# Patient Record
Sex: MALE | Race: WHITE | NOT HISPANIC OR LATINO | URBAN - METROPOLITAN AREA
[De-identification: names, ages, dates, MRNs, and addresses within clinical notes are randomized per-mention and may not be internally consistent; named-entity substitution may affect disease eponyms.]

---

## 2022-11-03 ENCOUNTER — INPATIENT (INPATIENT)
Facility: HOSPITAL | Age: 87
LOS: 11 days | Discharge: NURSING, REHAB OR LONG-TERM CARE | DRG: 696 | End: 2022-11-15
Attending: INTERNAL MEDICINE | Admitting: INTERNAL MEDICINE
Payer: MEDICARE

## 2022-11-03 VITALS
RESPIRATION RATE: 16 BRPM | WEIGHT: 195.11 LBS | HEIGHT: 75 IN | OXYGEN SATURATION: 97 % | SYSTOLIC BLOOD PRESSURE: 131 MMHG | HEART RATE: 92 BPM | TEMPERATURE: 98 F | DIASTOLIC BLOOD PRESSURE: 76 MMHG

## 2022-11-03 DIAGNOSIS — Z29.9 ENCOUNTER FOR PROPHYLACTIC MEASURES, UNSPECIFIED: ICD-10-CM

## 2022-11-03 DIAGNOSIS — R31.9 HEMATURIA, UNSPECIFIED: ICD-10-CM

## 2022-11-03 DIAGNOSIS — N17.9 ACUTE KIDNEY FAILURE, UNSPECIFIED: Chronic | ICD-10-CM

## 2022-11-03 DIAGNOSIS — I10 ESSENTIAL (PRIMARY) HYPERTENSION: ICD-10-CM

## 2022-11-03 DIAGNOSIS — F03.90 UNSPECIFIED DEMENTIA, UNSPECIFIED SEVERITY, WITHOUT BEHAVIORAL DISTURBANCE, PSYCHOTIC DISTURBANCE, MOOD DISTURBANCE, AND ANXIETY: ICD-10-CM

## 2022-11-03 DIAGNOSIS — I49.9 CARDIAC ARRHYTHMIA, UNSPECIFIED: ICD-10-CM

## 2022-11-03 DIAGNOSIS — N17.9 ACUTE KIDNEY FAILURE, UNSPECIFIED: ICD-10-CM

## 2022-11-03 LAB
ALBUMIN SERPL ELPH-MCNC: 3.4 G/DL — LOW (ref 3.5–5)
ALP SERPL-CCNC: 124 U/L — HIGH (ref 40–120)
ALT FLD-CCNC: 19 U/L DA — SIGNIFICANT CHANGE UP (ref 10–60)
ANION GAP SERPL CALC-SCNC: 5 MMOL/L — SIGNIFICANT CHANGE UP (ref 5–17)
ANION GAP SERPL CALC-SCNC: 9 MMOL/L — SIGNIFICANT CHANGE UP (ref 5–17)
APPEARANCE UR: ABNORMAL
APTT BLD: 39.9 SEC — HIGH (ref 27.5–35.5)
AST SERPL-CCNC: 22 U/L — SIGNIFICANT CHANGE UP (ref 10–40)
BASOPHILS # BLD AUTO: 0.06 K/UL — SIGNIFICANT CHANGE UP (ref 0–0.2)
BASOPHILS NFR BLD AUTO: 0.5 % — SIGNIFICANT CHANGE UP (ref 0–2)
BILIRUB SERPL-MCNC: 0.5 MG/DL — SIGNIFICANT CHANGE UP (ref 0.2–1.2)
BILIRUB UR-MCNC: NEGATIVE — SIGNIFICANT CHANGE UP
BUN SERPL-MCNC: 48 MG/DL — HIGH (ref 7–18)
BUN SERPL-MCNC: 60 MG/DL — HIGH (ref 7–18)
CALCIUM SERPL-MCNC: 9.2 MG/DL — SIGNIFICANT CHANGE UP (ref 8.4–10.5)
CALCIUM SERPL-MCNC: 9.4 MG/DL — SIGNIFICANT CHANGE UP (ref 8.4–10.5)
CHLORIDE SERPL-SCNC: 104 MMOL/L — SIGNIFICANT CHANGE UP (ref 96–108)
CHLORIDE SERPL-SCNC: 107 MMOL/L — SIGNIFICANT CHANGE UP (ref 96–108)
CK SERPL-CCNC: 40 U/L — SIGNIFICANT CHANGE UP (ref 35–232)
CO2 SERPL-SCNC: 24 MMOL/L — SIGNIFICANT CHANGE UP (ref 22–31)
CO2 SERPL-SCNC: 26 MMOL/L — SIGNIFICANT CHANGE UP (ref 22–31)
COLOR SPEC: ABNORMAL
CREAT SERPL-MCNC: 1.9 MG/DL — HIGH (ref 0.5–1.3)
CREAT SERPL-MCNC: 2.15 MG/DL — HIGH (ref 0.5–1.3)
DIFF PNL FLD: ABNORMAL
EGFR: 29 ML/MIN/1.73M2 — LOW
EGFR: 33 ML/MIN/1.73M2 — LOW
EOSINOPHIL # BLD AUTO: 0.17 K/UL — SIGNIFICANT CHANGE UP (ref 0–0.5)
EOSINOPHIL NFR BLD AUTO: 1.5 % — SIGNIFICANT CHANGE UP (ref 0–6)
GLUCOSE SERPL-MCNC: 111 MG/DL — HIGH (ref 70–99)
GLUCOSE SERPL-MCNC: 115 MG/DL — HIGH (ref 70–99)
GLUCOSE UR QL: NEGATIVE — SIGNIFICANT CHANGE UP
HCT VFR BLD CALC: 38.7 % — LOW (ref 39–50)
HCT VFR BLD CALC: 39.3 % — SIGNIFICANT CHANGE UP (ref 39–50)
HGB BLD-MCNC: 12.3 G/DL — LOW (ref 13–17)
HGB BLD-MCNC: 12.6 G/DL — LOW (ref 13–17)
IMM GRANULOCYTES NFR BLD AUTO: 0.7 % — SIGNIFICANT CHANGE UP (ref 0–0.9)
INR BLD: 2.22 RATIO — HIGH (ref 0.88–1.16)
KETONES UR-MCNC: ABNORMAL
LEUKOCYTE ESTERASE UR-ACNC: ABNORMAL
LYMPHOCYTES # BLD AUTO: 1.41 K/UL — SIGNIFICANT CHANGE UP (ref 1–3.3)
LYMPHOCYTES # BLD AUTO: 12.2 % — LOW (ref 13–44)
MAGNESIUM SERPL-MCNC: 2 MG/DL — SIGNIFICANT CHANGE UP (ref 1.6–2.6)
MCHC RBC-ENTMCNC: 29.5 PG — SIGNIFICANT CHANGE UP (ref 27–34)
MCHC RBC-ENTMCNC: 29.5 PG — SIGNIFICANT CHANGE UP (ref 27–34)
MCHC RBC-ENTMCNC: 31.8 GM/DL — LOW (ref 32–36)
MCHC RBC-ENTMCNC: 32.1 GM/DL — SIGNIFICANT CHANGE UP (ref 32–36)
MCV RBC AUTO: 92 FL — SIGNIFICANT CHANGE UP (ref 80–100)
MCV RBC AUTO: 92.8 FL — SIGNIFICANT CHANGE UP (ref 80–100)
MONOCYTES # BLD AUTO: 1.39 K/UL — HIGH (ref 0–0.9)
MONOCYTES NFR BLD AUTO: 12 % — SIGNIFICANT CHANGE UP (ref 2–14)
NEUTROPHILS # BLD AUTO: 8.45 K/UL — HIGH (ref 1.8–7.4)
NEUTROPHILS NFR BLD AUTO: 73.1 % — SIGNIFICANT CHANGE UP (ref 43–77)
NITRITE UR-MCNC: NEGATIVE — SIGNIFICANT CHANGE UP
NRBC # BLD: 0 /100 WBCS — SIGNIFICANT CHANGE UP (ref 0–0)
NRBC # BLD: 0 /100 WBCS — SIGNIFICANT CHANGE UP (ref 0–0)
PH UR: 6.5 — SIGNIFICANT CHANGE UP (ref 5–8)
PHOSPHATE SERPL-MCNC: 3.3 MG/DL — SIGNIFICANT CHANGE UP (ref 2.5–4.5)
PLATELET # BLD AUTO: 251 K/UL — SIGNIFICANT CHANGE UP (ref 150–400)
PLATELET # BLD AUTO: 259 K/UL — SIGNIFICANT CHANGE UP (ref 150–400)
POTASSIUM SERPL-MCNC: 4.3 MMOL/L — SIGNIFICANT CHANGE UP (ref 3.5–5.3)
POTASSIUM SERPL-MCNC: 4.4 MMOL/L — SIGNIFICANT CHANGE UP (ref 3.5–5.3)
POTASSIUM SERPL-SCNC: 4.3 MMOL/L — SIGNIFICANT CHANGE UP (ref 3.5–5.3)
POTASSIUM SERPL-SCNC: 4.4 MMOL/L — SIGNIFICANT CHANGE UP (ref 3.5–5.3)
PROT SERPL-MCNC: 8.4 G/DL — HIGH (ref 6–8.3)
PROT UR-MCNC: 500 MG/DL
PROTHROM AB SERPL-ACNC: 26.6 SEC — HIGH (ref 10.5–13.4)
RBC # BLD: 4.17 M/UL — LOW (ref 4.2–5.8)
RBC # BLD: 4.27 M/UL — SIGNIFICANT CHANGE UP (ref 4.2–5.8)
RBC # FLD: 13.8 % — SIGNIFICANT CHANGE UP (ref 10.3–14.5)
RBC # FLD: 14.1 % — SIGNIFICANT CHANGE UP (ref 10.3–14.5)
SARS-COV-2 RNA SPEC QL NAA+PROBE: SIGNIFICANT CHANGE UP
SODIUM SERPL-SCNC: 137 MMOL/L — SIGNIFICANT CHANGE UP (ref 135–145)
SODIUM SERPL-SCNC: 138 MMOL/L — SIGNIFICANT CHANGE UP (ref 135–145)
SP GR SPEC: 1.01 — SIGNIFICANT CHANGE UP (ref 1.01–1.02)
UROBILINOGEN FLD QL: NEGATIVE — SIGNIFICANT CHANGE UP
WBC # BLD: 11.56 K/UL — HIGH (ref 3.8–10.5)
WBC # BLD: 11.77 K/UL — HIGH (ref 3.8–10.5)
WBC # FLD AUTO: 11.56 K/UL — HIGH (ref 3.8–10.5)
WBC # FLD AUTO: 11.77 K/UL — HIGH (ref 3.8–10.5)

## 2022-11-03 PROCEDURE — 74176 CT ABD & PELVIS W/O CONTRAST: CPT | Mod: 26,MA

## 2022-11-03 PROCEDURE — 70450 CT HEAD/BRAIN W/O DYE: CPT | Mod: 26,MA

## 2022-11-03 PROCEDURE — 99285 EMERGENCY DEPT VISIT HI MDM: CPT

## 2022-11-03 RX ORDER — SODIUM CHLORIDE 9 MG/ML
1000 INJECTION INTRAMUSCULAR; INTRAVENOUS; SUBCUTANEOUS ONCE
Refills: 0 | Status: COMPLETED | OUTPATIENT
Start: 2022-11-03 | End: 2022-11-03

## 2022-11-03 RX ORDER — METOPROLOL TARTRATE 50 MG
12.5 TABLET ORAL
Refills: 0 | Status: DISCONTINUED | OUTPATIENT
Start: 2022-11-03 | End: 2022-11-15

## 2022-11-03 RX ORDER — SODIUM CHLORIDE 9 MG/ML
1000 INJECTION, SOLUTION INTRAVENOUS
Refills: 0 | Status: DISCONTINUED | OUTPATIENT
Start: 2022-11-03 | End: 2022-11-10

## 2022-11-03 RX ORDER — METOPROLOL TARTRATE 50 MG
62.5 TABLET ORAL
Refills: 0 | Status: DISCONTINUED | OUTPATIENT
Start: 2022-11-03 | End: 2022-11-03

## 2022-11-03 RX ORDER — METOPROLOL TARTRATE 50 MG
50 TABLET ORAL
Refills: 0 | Status: DISCONTINUED | OUTPATIENT
Start: 2022-11-03 | End: 2022-11-07

## 2022-11-03 RX ORDER — ATROPA BELLADONNA AND OPIUM 16.2; 6 MG/1; MG/1
1 SUPPOSITORY RECTAL THREE TIMES A DAY
Refills: 0 | Status: DISCONTINUED | OUTPATIENT
Start: 2022-11-03 | End: 2022-11-03

## 2022-11-03 RX ORDER — OXYBUTYNIN CHLORIDE 5 MG
5 TABLET ORAL THREE TIMES A DAY
Refills: 0 | Status: DISCONTINUED | OUTPATIENT
Start: 2022-11-03 | End: 2022-11-09

## 2022-11-03 RX ORDER — CEFTRIAXONE 500 MG/1
1000 INJECTION, POWDER, FOR SOLUTION INTRAMUSCULAR; INTRAVENOUS EVERY 24 HOURS
Refills: 0 | Status: COMPLETED | OUTPATIENT
Start: 2022-11-04 | End: 2022-11-06

## 2022-11-03 RX ORDER — MULTIVIT-MIN/FERROUS GLUCONATE 9 MG/15 ML
1 LIQUID (ML) ORAL DAILY
Refills: 0 | Status: DISCONTINUED | OUTPATIENT
Start: 2022-11-03 | End: 2022-11-15

## 2022-11-03 RX ADMIN — SODIUM CHLORIDE 1000 MILLILITER(S): 9 INJECTION INTRAMUSCULAR; INTRAVENOUS; SUBCUTANEOUS at 17:16

## 2022-11-03 RX ADMIN — SODIUM CHLORIDE 1000 MILLILITER(S): 9 INJECTION INTRAMUSCULAR; INTRAVENOUS; SUBCUTANEOUS at 16:00

## 2022-11-03 RX ADMIN — Medication 5 MILLIGRAM(S): at 22:42

## 2022-11-03 NOTE — H&P ADULT - PROBLEM SELECTOR PLAN 2
pt takes metoprolol succ 100 mg + 25 mg daily, as well as losartan daily  cont home meds w parameters SCr 2.15 (unk baseline)    Ct demonstrated Bilateral hydronephrosis  Suggestive of obstructive uropathy in setting of urinary retention    Trend SCr, repeat BMP in a.m.  -gentle hydration, 80 cc/hr LR SCr 2.15 (unk baseline)    Ct demonstrated Bilateral hydronephrosis  Suggestive of obstructive uropathy in setting of urinary retention    Trend SCr, repeat BMP  -gentle hydration, 80 cc/hr LR SCr 2.15 (unk baseline)    Ct demonstrated Bilateral hydronephrosis  Suggestive of obstructive uropathy in setting of urinary retention    Trend SCr, repeat BMP  -Dr Bailey nephro, appreciate recs  -gentle hydration, 80 cc/hr LR

## 2022-11-03 NOTE — H&P ADULT - ASSESSMENT
Pt is an 88 yo M w PMH unspecified cardiac arrhythmia on eliquis, HTN, dementia, admitted for acute hematuria.

## 2022-11-03 NOTE — ED PROVIDER NOTE - CARE PLAN
1 Principal Discharge DX:	Hematuria  Secondary Diagnosis:	TERENCE (acute kidney injury)  Secondary Diagnosis:	Dementia

## 2022-11-03 NOTE — H&P ADULT - PROBLEM SELECTOR PLAN 4
not on medications at home  -delirium and dementia precautions pt takes eliquis for unknown cardiac arrhythmia  hold for hematuria

## 2022-11-03 NOTE — H&P ADULT - PROBLEM SELECTOR PLAN 3
pt takes eliquis for unknown cardiac arrhythmia  hold for hematuria pt takes metoprolol succ 100 mg + 25 mg daily, as well as losartan daily  cont home meds w parameters

## 2022-11-03 NOTE — H&P ADULT - NSHPPHYSICALEXAM_GEN_ALL_CORE
T(C): 36.6 (11-03-22 @ 15:15), Max: 36.7 (11-03-22 @ 11:44)  HR: 66 (11-03-22 @ 15:15) (66 - 92)  BP: 119/75 (11-03-22 @ 15:15) (119/75 - 131/76)  RR: 18 (11-03-22 @ 15:15) (16 - 18)  SpO2: 99% (11-03-22 @ 15:15) (97% - 99%)    CONSTITUTIONAL: Well groomed, no acute distress    HEENT: normotramuatic, acephalic, PERRL and symmetric, EOMI, No conjunctival or scleral injection, non-icteric. Oral mucosa with moist membranes. No external nasal lesions; nasal mucosa not inflamed; poor dentition, missing teeth; no pharyngeal injection or exudates.               Neck: supple, symmetric and without tracheal deviation    RESPIRATORY: No respiratory distress, no use of accessory muscles; CTA b/l, no wheezes, rales or rhonchi    CARDIOVASCULAR: RRRR, +S1S2, +systolic murmur; no JVD; no peripheral edema  	Vascular:  carotid pulse palpable, radial pulse palpable,     GASTROINTESTINAL: +BS, Soft, non tender, non distended, no rebound, no guarding;    MUSCULOSKELETAL: +cold, white fingertips; examination of the (head/neck, spine/ribs/pelvis, RUE, LUE, RLE, LLE) without misalignment,  no spinal tenderness, normal muscle strength/tone    SKIN: No rashes or ulcers noted; no subcutaneous nodules or induration palpable    NEUROLOGIC: CN II-XII intact; sensation intact in upper and lower extremities b/l to light touch; Babinski down b/l    PSYCHIATRIC: AOx1 (self), remote memory intact, confused      GENITOURINARY:  	MALE: Normal appearing external genitalia, no penile lesion; valencia in place    LYMPHATIC: No cervical LAD or tenderness

## 2022-11-03 NOTE — H&P ADULT - NSHPREVIEWOFSYSTEMS_GEN_ALL_CORE
REVIEW OF SYSTEMS:  CONSTITUTIONAL: Denies weakness, fevers or chills  HEENT: Denies headache, dizziness, visual changes, vertigo, throat pain   RESPIRATORY: Denies cough, wheezing, hemoptysis; denies shortness of breath  CARDIOVASCULAR: denies chest pain or palpitations  GASTROINTESTINAL: denies abdominal  pain, nausea, vomiting, diarrhea, constipation. Denies melena and hematochezia.  GENITOURINARY: Denies dysuria, frequency  NEUROLOGICAL: Denies numbness or weakness  SKIN: Denies itching, rashes

## 2022-11-03 NOTE — ED PROVIDER NOTE - CLINICAL SUMMARY MEDICAL DECISION MAKING FREE TEXT BOX
2L of bloody urine output with catheter placement. No obstruction/clotting off. No kidney stone on CT. Hb appropriate. D/w [facility] of urology and will see pt. Given IVF. Patient nontoxic appearing, hemodynamically stable. Admitted to internal medicine for further monitoring, w/u, and care.

## 2022-11-03 NOTE — H&P ADULT - CONVERSATION DETAILS
Discussed with both patient and his wife about what measures they would like taken if the patient's heart were to stop, or if he were to stop breathing. Patient emphatically declined wanting measures to restart his heart, and declined any intubation. Patient's wife agreed, and did not want compressions, intubation, or ventilation. They endorsed wanting other treatment, including antibiotics, hospitalization, IV medications, etc., but did not want resuscitation or intubation. Patient's wife signed the MOLST form on behalf of the patient.

## 2022-11-03 NOTE — H&P ADULT - NSICDXPASTMEDICALHX_GEN_ALL_CORE_FT
PAST MEDICAL HISTORY:  Arrhythmia     Dementia     Hematuria     HTN (hypertension)     Visual impairment

## 2022-11-03 NOTE — H&P ADULT - PROBLEM SELECTOR PLAN 1
Pt presenting with acute hematuria, and urinary retention.  Vogel in place, draining >350 cc of emigdio red blood    Ct demonstrated Bilateral hydronephrosis  SCr 2.15 (unk baseline)  Suggestive of obstructive uropathy in setting of urinary retention    -Urology on board, recs appreciated  -CBI started by urology  -As per urology recs, oxybutynin 5 mg TID for bladder spasm, empiric CTX, and belladonna/opiate suppository 15 mg TID if available  -Pt would benefit from CT urogram  -o/p cystoscopy  - type and screen ordered, transfuse if Hb <7 Pt presenting with acute hematuria, and urinary retention.  Vogel in place, draining >350 cc of emigdio red blood    Ct demonstrated Bilateral hydronephrosis  SCr 2.15 (unk baseline)  Suggestive of obstructive uropathy in setting of urinary retention    -Urology on board, recs appreciated  -CBI started by urology  -As per urology recs, oxybutynin 5 mg TID for bladder spasm, empiric CTX, and belladonna/opiate suppository 15 mg TID if available  -Pt would benefit from CT urogram, however elevated SCr is contraindication for contrast  -o/p cystoscopy    -monitor cbc  - type and screen ordered, transfuse if Hb <7 Pt presenting with acute hematuria, and urinary retention.  approx 2 L urine drained with valencia  Valencia in place, draining >350 cc of emigdio red blood    CT demonstrated Bilateral hydronephrosis  SCr 2.15 (unk baseline)  Suggestive of obstructive uropathy in setting of urinary retention    -Urology on board, recs appreciated  -CBI started by urology  -As per urology recs, oxybutynin 5 mg TID for bladder spasm, empiric CTX, and belladonna/opiate suppository 15 mg TID if available  -Pt would benefit from CT urogram, however elevated SCr is contraindication for contrast  -o/p cystoscopy    -monitor cbc  - type and screen ordered, transfuse if Hb <7  -monitor BMP for electrolyte shifts as pt had large amount of urine drained Pt presenting with acute hematuria, and urinary retention.  approx 2 L urine drained with valencia  Valencia in place, draining >350 cc of emigdio red blood    CT demonstrated Bilateral hydronephrosis  SCr 2.15 (unk baseline)  Suggestive of obstructive uropathy in setting of urinary retention    -Urology on board, recs appreciated  -CBI started by urology  -As per urology recs, oxybutynin 5 mg TID for bladder spasm, empiric CTX, and belladonna/opiate suppository 15 mg TID if available  -Pt would benefit from CT urogram, however elevated SCr is contraindication for contrast  -o/p cystoscopy    -monitor cbc  - type and screen ordered, transfuse if Hb <7  -monitor BMP for electrolyte shifts as pt had large amount of urine drained.   -maintenance fluids ordered, as pt had 2 L NS ordered while in ED

## 2022-11-03 NOTE — H&P ADULT - HISTORY OF PRESENT ILLNESS
Pt is an 88 yo M w PMH unspecified arrhythmia on Eliquis, HTN, dementia, admitted for AMS and acute hematuria. As per wife who was at bedside, pt has had painless hematuria since yesterday. He has not had any prior hx of such an episode. Wife also states he has been a little more confused for the past few days than usual, but states that over the past month he has been "forgetting" more things. States that pt has been compliant with medications, and has not been to a hospital in many years, but has had multiple surgeries in the past. Pt himself is a poor historian, and wife provided most of the history.    Pt and wife deny all other symptoms including abd pain, n/v, fever, headache, dizziness.

## 2022-11-03 NOTE — ED PROVIDER NOTE - OBJECTIVE STATEMENT
89yoM with h/o HTN, dementia, on Eliquis 5mg BID, presents with wife with hematuria. States onset this morning. Wife also states he has been a little more confused for the past few days than usual. Pt denies all other symptoms including abd pain, v, fever. Pt poor historian and difficulty obtaining further hx.

## 2022-11-04 DIAGNOSIS — Z02.9 ENCOUNTER FOR ADMINISTRATIVE EXAMINATIONS, UNSPECIFIED: ICD-10-CM

## 2022-11-04 LAB
ALBUMIN SERPL ELPH-MCNC: 2.7 G/DL — LOW (ref 3.5–5)
ALP SERPL-CCNC: 100 U/L — SIGNIFICANT CHANGE UP (ref 40–120)
ALT FLD-CCNC: 17 U/L DA — SIGNIFICANT CHANGE UP (ref 10–60)
ANION GAP SERPL CALC-SCNC: 8 MMOL/L — SIGNIFICANT CHANGE UP (ref 5–17)
AST SERPL-CCNC: 23 U/L — SIGNIFICANT CHANGE UP (ref 10–40)
BILIRUB SERPL-MCNC: 0.7 MG/DL — SIGNIFICANT CHANGE UP (ref 0.2–1.2)
BLD GP AB SCN SERPL QL: SIGNIFICANT CHANGE UP
BUN SERPL-MCNC: 42 MG/DL — HIGH (ref 7–18)
CALCIUM SERPL-MCNC: 9.1 MG/DL — SIGNIFICANT CHANGE UP (ref 8.4–10.5)
CHLORIDE SERPL-SCNC: 107 MMOL/L — SIGNIFICANT CHANGE UP (ref 96–108)
CO2 SERPL-SCNC: 24 MMOL/L — SIGNIFICANT CHANGE UP (ref 22–31)
CREAT SERPL-MCNC: 1.73 MG/DL — HIGH (ref 0.5–1.3)
CULTURE RESULTS: NO GROWTH — SIGNIFICANT CHANGE UP
EGFR: 37 ML/MIN/1.73M2 — LOW
GLUCOSE SERPL-MCNC: 114 MG/DL — HIGH (ref 70–99)
HCT VFR BLD CALC: 35.9 % — LOW (ref 39–50)
HGB BLD-MCNC: 11.7 G/DL — LOW (ref 13–17)
MAGNESIUM SERPL-MCNC: 1.9 MG/DL — SIGNIFICANT CHANGE UP (ref 1.6–2.6)
MCHC RBC-ENTMCNC: 29.8 PG — SIGNIFICANT CHANGE UP (ref 27–34)
MCHC RBC-ENTMCNC: 32.6 GM/DL — SIGNIFICANT CHANGE UP (ref 32–36)
MCV RBC AUTO: 91.6 FL — SIGNIFICANT CHANGE UP (ref 80–100)
NRBC # BLD: 0 /100 WBCS — SIGNIFICANT CHANGE UP (ref 0–0)
PHOSPHATE SERPL-MCNC: 3.1 MG/DL — SIGNIFICANT CHANGE UP (ref 2.5–4.5)
PLATELET # BLD AUTO: 231 K/UL — SIGNIFICANT CHANGE UP (ref 150–400)
POTASSIUM SERPL-MCNC: 4.5 MMOL/L — SIGNIFICANT CHANGE UP (ref 3.5–5.3)
POTASSIUM SERPL-SCNC: 4.5 MMOL/L — SIGNIFICANT CHANGE UP (ref 3.5–5.3)
PROT SERPL-MCNC: 7.5 G/DL — SIGNIFICANT CHANGE UP (ref 6–8.3)
RBC # BLD: 3.92 M/UL — LOW (ref 4.2–5.8)
RBC # FLD: 14 % — SIGNIFICANT CHANGE UP (ref 10.3–14.5)
SODIUM SERPL-SCNC: 139 MMOL/L — SIGNIFICANT CHANGE UP (ref 135–145)
SPECIMEN SOURCE: SIGNIFICANT CHANGE UP
WBC # BLD: 11.64 K/UL — HIGH (ref 3.8–10.5)
WBC # FLD AUTO: 11.64 K/UL — HIGH (ref 3.8–10.5)

## 2022-11-04 RX ORDER — OLANZAPINE 15 MG/1
2.5 TABLET, FILM COATED ORAL EVERY 8 HOURS
Refills: 0 | Status: DISCONTINUED | OUTPATIENT
Start: 2022-11-04 | End: 2022-11-15

## 2022-11-04 RX ORDER — INFLUENZA VIRUS VACCINE 15; 15; 15; 15 UG/.5ML; UG/.5ML; UG/.5ML; UG/.5ML
0.7 SUSPENSION INTRAMUSCULAR ONCE
Refills: 0 | Status: COMPLETED | OUTPATIENT
Start: 2022-11-04 | End: 2022-11-14

## 2022-11-04 RX ADMIN — Medication 50 MILLIGRAM(S): at 17:50

## 2022-11-04 RX ADMIN — Medication 5 MILLIGRAM(S): at 06:43

## 2022-11-04 RX ADMIN — Medication 12.5 MILLIGRAM(S): at 06:43

## 2022-11-04 RX ADMIN — OLANZAPINE 2.5 MILLIGRAM(S): 15 TABLET, FILM COATED ORAL at 23:22

## 2022-11-04 RX ADMIN — SODIUM CHLORIDE 85 MILLILITER(S): 9 INJECTION, SOLUTION INTRAVENOUS at 02:38

## 2022-11-04 RX ADMIN — CEFTRIAXONE 100 MILLIGRAM(S): 500 INJECTION, POWDER, FOR SOLUTION INTRAMUSCULAR; INTRAVENOUS at 02:37

## 2022-11-04 RX ADMIN — Medication 50 MILLIGRAM(S): at 06:43

## 2022-11-04 RX ADMIN — Medication 12.5 MILLIGRAM(S): at 17:50

## 2022-11-04 RX ADMIN — Medication 5 MILLIGRAM(S): at 23:21

## 2022-11-04 NOTE — CONSULT NOTE ADULT - ASSESSMENT
TERENCE post renal due to neurogenic versus obstructive uropathy  Macroscopic hematuria with un clear source.  Renal function improved after valencia catheter placement.    Continue IV fluids and follow daily electrolytes  Urology follow up , plan for cystoscopy. Oxybutynin as per urology.  Monitor H/H .          
89yoM with h/o HTN, dementia, on Eliquis 5mg BID, presents with wife with hematuria. TERENCE, Hgb stable, Hematuria improved after irrigation. However was obstructed on arrival in ED with significant urine output after valencia placement. Admitted to medicine. U/A with blood but only trace LE and negative bacteria  - Recommend monitoring BMP for post obstructive diuresis  - Monitor urine color, will consider CBI if it does not improve  - Monitor H&H  - Hold AC if possible   - F/u urine culture  - Bilateral hydronephrosis and TERENCE possibly due to obstructive uropathy in setting of urinary retention  - Monitor Cr now that catheter is in  - Repeat renal u/s in 3-5 days to see if hydronephrosis improves  - Patient will need outpatient cystoscopy and CT urogram for gross hematuria workup  - To be discussed with Dr. Cee

## 2022-11-04 NOTE — PROGRESS NOTE ADULT - ASSESSMENT
Pt is an 89 yo M w PMH unspecified cardiac arrhythmia on eliquis, HTN, dementia, p/w to hospital with AMS and acute hematuria x 1 day. Vogel Catheter placed in ED with 2 L of hematuria with minimal clots noted on. pt admitted to medicine for acute hematuria. Ct abd notable for moderate left and mild right hydronephrosis with hydroureter. continues with CBI, has intermittent clots noted. urology following.

## 2022-11-04 NOTE — PROGRESS NOTE ADULT - PROBLEM SELECTOR PLAN 7
-pt lives at home with wife  -spoke with wife regarding plan of care, questions/concerns addressed  -wife states shes primary caregiver of pt, however may not be able to continue to do so, thinking about possible LTC placement.   -PT eval pending

## 2022-11-04 NOTE — PROGRESS NOTE ADULT - SUBJECTIVE AND OBJECTIVE BOX
NP Note discussed with  Primary Attending    Patient is a 89y old  Male who presents with a chief complaint of     INTERVAL HPI/OVERNIGHT EVENTS: no new complaints    MEDICATIONS  (STANDING):  cefTRIAXone   IVPB 1000 milliGRAM(s) IV Intermittent every 24 hours  lactated ringers. 1000 milliLiter(s) (85 mL/Hr) IV Continuous <Continuous>  metoprolol tartrate 50 milliGRAM(s) Oral two times a day  metoprolol tartrate 12.5 milliGRAM(s) Oral two times a day  multivitamin/minerals 1 Tablet(s) Oral daily  oxybutynin 5 milliGRAM(s) Oral three times a day    MEDICATIONS  (PRN):      __________________________________________________  REVIEW OF SYSTEMS:    CONSTITUTIONAL: No fever,   EYES: no acute visual disturbances  NECK: No pain or stiffness  RESPIRATORY: No cough; No shortness of breath  CARDIOVASCULAR: No chest pain, no palpitations  GASTROINTESTINAL: No pain. No nausea or vomiting; No diarrhea   NEUROLOGICAL: No headache or numbness, no tremors  MUSCULOSKELETAL: No joint pain, no muscle pain  GENITOURINARY: no dysuria, no frequency, no hesitancy  PSYCHIATRY: no depression , no anxiety  ALL OTHER  ROS negative        Vital Signs Last 24 Hrs  T(C): 36.9 (2022 05:43), Max: 36.9 (2022 05:43)  T(F): 98.4 (2022 05:43), Max: 98.4 (2022 05:43)  HR: 62 (2022 05:43) (62 - 92)  BP: 115/64 (2022 05:43) (115/64 - 147/93)  BP(mean): --  RR: 18 (2022 05:43) (16 - 18)  SpO2: 96% (2022 05:43) (96% - 100%)    Parameters below as of 2022 05:43  Patient On (Oxygen Delivery Method): room air        ________________________________________________  PHYSICAL EXAM:  GENERAL: NAD  HEENT: Normocephalic;  conjunctivae and sclerae clear; moist mucous membranes;   NECK : supple  CHEST/LUNG: Clear to auscultation bilaterally with good air entry   HEART: S1 S2  regular; no murmurs, gallops or rubs  ABDOMEN: Soft, Nontender, Nondistended; Bowel sounds present  EXTREMITIES: no cyanosis; no edema; no calf tenderness  SKIN: warm and dry; no rash  NERVOUS SYSTEM:  Awake and alert; Oriented  to place, person and time ; no new deficits    _________________________________________________  LABS:                        11.7   11.64 )-----------( 231      ( 2022 07:45 )             35.9     11-    139  |  107  |  42<H>  ----------------------------<  114<H>  4.5   |  24  |  1.73<H>    Ca    9.1      2022 07:45  Phos  3.1     11-  Mg     1.9     -    TPro  7.5  /  Alb  2.7<L>  /  TBili  0.7  /  DBili  x   /  AST  23  /  ALT  17  /  AlkPhos  100  11-04    PT/INR - ( 2022 13:35 )   PT: 26.6 sec;   INR: 2.22 ratio         PTT - ( 2022 13:35 )  PTT:39.9 sec  Urinalysis Basic - ( 2022 13:58 )    Color: Red / Appearance: bloody / S.010 / pH: x  Gluc: x / Ketone: Trace  / Bili: Negative / Urobili: Negative   Blood: x / Protein: 500 mg/dL / Nitrite: Negative   Leuk Esterase: Trace / RBC: >50 /HPF / WBC 0-2 /HPF   Sq Epi: x / Non Sq Epi: x / Bacteria: Negative /HPF      CAPILLARY BLOOD GLUCOSE            RADIOLOGY & ADDITIONAL TESTS:    Imaging  Reviewed:  YES/NO    Consultant(s) Notes Reviewed:   YES/ No      Plan of care was discussed with patient and /or primary care giver; all questions and concerns were addressed  NP Note discussed with  Primary Attending    Patient is a 89y old  Male who presents with a chief complaint of     INTERVAL HPI/OVERNIGHT EVENTS: no new complaints    MEDICATIONS  (STANDING):  cefTRIAXone   IVPB 1000 milliGRAM(s) IV Intermittent every 24 hours  lactated ringers. 1000 milliLiter(s) (85 mL/Hr) IV Continuous <Continuous>  metoprolol tartrate 50 milliGRAM(s) Oral two times a day  metoprolol tartrate 12.5 milliGRAM(s) Oral two times a day  multivitamin/minerals 1 Tablet(s) Oral daily  oxybutynin 5 milliGRAM(s) Oral three times a day    MEDICATIONS  (PRN):      __________________________________________________  REVIEW OF SYSTEMS: unable to fully assess 2/2 mentation, denies pain/discomfort.     Vital Signs Last 24 Hrs  T(C): 36.9 (2022 05:43), Max: 36.9 (2022 05:43)  T(F): 98.4 (2022 05:43), Max: 98.4 (2022 05:43)  HR: 62 (2022 05:43) (62 - 92)  BP: 115/64 (2022 05:43) (115/64 - 147/93)  BP(mean): --  RR: 18 (2022 05:43) (16 - 18)  SpO2: 96% (2022 05:43) (96% - 100%)    Parameters below as of 2022 05:43  Patient On (Oxygen Delivery Method): room air        ________________________________________________  PHYSICAL EXAM:  GENERAL: NAD  HEENT: Normocephalic;  conjunctivae and sclerae clear; moist mucous membranes;   NECK : supple  CHEST/LUNG: Clear to auscultation bilaterally with good air entry   HEART: S1 S2  regular; no murmurs, gallops or rubs  ABDOMEN: Soft, Nontender, Nondistended; Bowel sounds present  EXTREMITIES: no cyanosis; no edema; no calf tenderness. bilateral unrestrained mittens.   SKIN: warm and dry; no rash  NERVOUS SYSTEM:  Awake and alert; Oriented x1 to self only; no new deficits    _________________________________________________  LABS:                        11.7   11.64 )-----------( 231      ( 2022 07:45 )             35.9     11-04    139  |  107  |  42<H>  ----------------------------<  114<H>  4.5   |  24  |  1.73<H>    Ca    9.1      2022 07:45  Phos  3.1       Mg     1.9         TPro  7.5  /  Alb  2.7<L>  /  TBili  0.7  /  DBili  x   /  AST  23  /  ALT  17  /  AlkPhos  100  11-04    PT/INR - ( 2022 13:35 )   PT: 26.6 sec;   INR: 2.22 ratio         PTT - ( 2022 13:35 )  PTT:39.9 sec  Urinalysis Basic - ( 2022 13:58 )    Color: Red / Appearance: bloody / S.010 / pH: x  Gluc: x / Ketone: Trace  / Bili: Negative / Urobili: Negative   Blood: x / Protein: 500 mg/dL / Nitrite: Negative   Leuk Esterase: Trace / RBC: >50 /HPF / WBC 0-2 /HPF   Sq Epi: x / Non Sq Epi: x / Bacteria: Negative /HPF      CAPILLARY BLOOD GLUCOSE    < from: CT Abdomen and Pelvis No Cont (22 @ 14:30) >    ACC: [id number] EXAM:  CT ABDOMEN AND PELVIS                          PROCEDURE DATE:  2022          INTERPRETATION:  CLINICAL INFORMATION: 89-year-old male patient with   hematuria    COMPARISON: None.    CONTRAST/COMPLICATIONS:  IV Contrast: NONE  Oral Contrast: NONE  Complications: None reported at time of study completion    PROCEDURE:  CT of the Abdomen and Pelvis was performed.  Sagittal and coronal reformats were performed.    FINDINGS:  LOWER CHEST: Small right-sided pleural effusion and compression   atelectasis. Cardiomegaly. Coronary artery disease.    LIVER: Dilated hepatic veins. Slightly lobular contour of the liver, may   represent fibrosis.  BILE DUCTS: Normal caliber.  GALLBLADDER: Cholelithiasis.  SPLEEN: Within normallimits.  PANCREAS: Mild pancreatic parenchymal atrophy. No ductal dilatation.  ADRENALS: No nodules or masses.  KIDNEYS/URETERS: Bilateral hydronephrosis, moderate left and mild on the   right. Ureters are not abnormally dilated in entire length.    BLADDER: Bladder is decompressed with a Vogel catheter in place.  REPRODUCTIVE ORGANS: Prostate within normal limits.    BOWEL: No bowel obstruction. Appendix is normal. Colonic diverticulosis   without diverticulitis.  PERITONEUM: No ascites.  VESSELS: Abdominal aorta measures 2.9 cm demonstrates moderate   atherosclerotic changes.  RETROPERITONEUM/LYMPH NODES: No lymphadenopathy.  ABDOMINAL WALL: Within normal limits.  BONES: Degenerative changes of bilateral hip joints. Multilevel   discogenic degenerative disease in the lumbar spine, predominantly L2-3   and L5-S1.    IMPRESSION:  Moderate left and mild right-sided hydronephrosis without hydroureter.   Decompressed urinary bladder with a Vogel catheter in place. No   obstructive ureterolithiasis.    Cardiomegaly and right pleural effusion.      --- End of Report ---        NATE TOBIAS MD; Attending Radiologist  This document has been electronically signed. Nov  3 2022  4:28PM    < end of copied text >      Imaging  Reviewed:  YES    Consultant(s) Notes Reviewed:   YES      Plan of care was discussed with patient and /or primary care giver; all questions and concerns were addressed

## 2022-11-04 NOTE — PROGRESS NOTE ADULT - PROBLEM SELECTOR PLAN 1
Pt presenting with acute hematuria, and urinary retention.  approx 2 L urine drained with valencia  CT demonstrated Bilateral hydronephrosis  SCr 2.15 (unk baseline) improved today, 1.73  Suggestive of obstructive uropathy in setting of urinary retention  -CBI started by urology  -As per urology recs, oxybutynin 5 mg TID for bladder spasm, empiric CTX, and belladonna/opiate suppository 15 mg TID if available  -Pt would benefit from CT urogram, however elevated SCr is contraindication for contrast  -o/p cystoscopy  -monitor cbc  - type and screen ordered, transfuse if Hb <7  -monitor BMP for electrolyte shifts as pt had large amount of urine drained.   -maintenance fluids ordered  -Urology feeling

## 2022-11-04 NOTE — PROGRESS NOTE ADULT - PROBLEM SELECTOR PLAN 3
pt takes metoprolol succ 100 mg + 25 mg daily, as well as losartan daily  cont home meds w parameters

## 2022-11-04 NOTE — PROGRESS NOTE ADULT - ASSESSMENT
89yoM with h/o HTN, dementia, on Eliquis 5mg BID, presents with wife with hematuria. TERENCE, Hgb stable, Hematuria improved after irrigation. However was obstructed on arrival in ED with significant urine output after valencia placement. Admitted to medicine. U/A with blood but only trace LE and negative bacteria. Urine color this morning was ***  - Recommend monitoring BMP for post obstructive diuresis  - Monitor urine color, now on CBI, wean as tolerated  - Monitor H&H - stable  - Continue holding eliquis  - F/u urine culture  - Bilateral hydronephrosis and TERENCE possibly due to obstructive uropathy in setting of urinary retention --> Monitor Cr now that catheter is in (improved to 1.90)  - Repeat renal u/s in 3-5 days to see if hydronephrosis improves  - Patient will need outpatient cystoscopy and CT urogram for gross hematuria workup  - Continue with CTX  - Continue with ditropan and B&O suppositories for bladder spasms    Urology   89yoM with h/o HTN, dementia, on Eliquis 5mg BID, presents with wife with hematuria. TERENCE, Hgb stable, Hematuria improved after irrigation. However was obstructed on arrival in ED with significant urine output after valencia placement. Admitted to medicine. U/A with blood but only trace LE and negative bacteria. Urine color this morning was clear light pink on slow drip  - Recommend monitoring BMP for post obstructive diuresis  - Monitor urine color, now on CBI, wean as tolerated (consider clamp trial this PM)  - Monitor H&H - stable  - Continue holding eliquis  - F/u urine culture  - Bilateral hydronephrosis and TERENCE possibly due to obstructive uropathy in setting of urinary retention --> Monitor Cr now that catheter is in (improved to 1.90)  - Repeat renal u/s in 3-5 days to see if hydronephrosis improves  - Patient will need outpatient cystoscopy and CT urogram for gross hematuria workup  - Continue with CTX  - Continue with ditropan and B&O suppositories for bladder spasms    Urology

## 2022-11-04 NOTE — PROGRESS NOTE ADULT - SUBJECTIVE AND OBJECTIVE BOX
Subjective  No acute events overnight. Started on CBI late last evening. H&H stable last night. Cr improving. Patient feels well with no complaints. Poor historian.    Objective    Vital signs  T(F): , Max: 98.4 (11-04-22 @ 05:43)  HR: 62 (11-04-22 @ 05:43)  BP: 115/64 (11-04-22 @ 05:43)  SpO2: 96% (11-04-22 @ 05:43)  Wt(kg): --    Output         Gen: NAD  Abd: soft, nontender, nondistended  : valencia secured in place, on CBI, draining ***    Labs      11-03 @ 21:54    WBC 11.77 / Hct 38.7  / SCr 1.90     11-03 @ 13:35    WBC 11.56 / Hct 39.3  / SCr 2.15     Urine Cx: pending   Subjective  No acute events overnight. Started on CBI late last evening. H&H stable last night. Cr improving. Patient feels well with no complaints. Poor historian.    Objective    Vital signs  T(F): , Max: 98.4 (11-04-22 @ 05:43)  HR: 62 (11-04-22 @ 05:43)  BP: 115/64 (11-04-22 @ 05:43)  SpO2: 96% (11-04-22 @ 05:43)  Wt(kg): --    Output         Gen: NAD  Abd: soft, nontender, nondistended  : valencai secured in place, on CBI, draining clear light pink on slow drip    Labs      11-03 @ 21:54    WBC 11.77 / Hct 38.7  / SCr 1.90     11-03 @ 13:35    WBC 11.56 / Hct 39.3  / SCr 2.15     Urine Cx: pending

## 2022-11-04 NOTE — CONSULT NOTE ADULT - SUBJECTIVE AND OBJECTIVE BOX
HPI  89yoM with h/o HTN, dementia, on Eliquis 5mg BID, presents with wife with hematuria. States onset this morning. Wife also states he has been a little more confused for the past few days than usual. Pt denies all other symptoms including abd pain, v, fever. Pt poor historian and difficulty obtaining further hx.    Patient and wife state that he has never had hematuria before. He had an abnormal finding on a CT scan about 5-6 years ago but never followed up with anyone about it. He has never smoked. No burning with urination or increased frequency. In ED valencia catheter was placed and 2L of dark red urine came out with minimal clots.     PAST MEDICAL & SURGICAL HISTORY:  Hematuria      TERENCE (acute kidney injury)          MEDICATIONS  (STANDING):  sodium chloride 0.9% Bolus 1000 milliLiter(s) IV Bolus once    MEDICATIONS  (PRN):      FAMILY HISTORY:      Allergies    No Known Allergies    Intolerances        SOCIAL HISTORY:    REVIEW OF SYSTEMS: Otherwise negative as stated in HPI    Physical Exam  Vital signs  T(C): 36.6 (22 @ 15:15), Max: 36.7 (22 @ 11:44)  HR: 66 (22 @ 15:15)  BP: 119/75 (22 @ 15:15)  SpO2: 99% (22 @ 15:15)  Wt(kg): --    Output      Gen: NAD  Pulm: No respiratory distress	  CV: RRR  GI: S/ND/NT  : Valencia draining dark red urine. Irrigated bedside with removal of a few old clots. Colored cleared up to clear light punch quickly.  MSK: moves all 4 limbs spontaneously    LABS:       @ 13:35    WBC 11.56 / Hct 39.3  / SCr 2.15         137  |  104  |  60<H>  ----------------------------<  111<H>  4.3   |  24  |  2.15<H>    Ca    9.4      2022 13:35    TPro  8.4<H>  /  Alb  3.4<L>  /  TBili  0.5  /  DBili  x   /  AST  22  /  ALT  19  /  AlkPhos  124<H>      PT/INR - ( 2022 13:35 )   PT: 26.6 sec;   INR: 2.22 ratio         PTT - ( 2022 13:35 )  PTT:39.9 sec  Urinalysis Basic - ( 2022 13:58 )    Color: Red / Appearance: bloody / S.010 / pH: x  Gluc: x / Ketone: Trace  / Bili: Negative / Urobili: Negative   Blood: x / Protein: 500 mg/dL / Nitrite: Negative   Leuk Esterase: Trace / RBC: >50 /HPF / WBC 0-2 /HPF   Sq Epi: x / Non Sq Epi: x / Bacteria: Negative /HPF    Urine Cx: pending    RADIOLOGY:  < from: CT Abdomen and Pelvis No Cont (22 @ 14:30) >    IMPRESSION:  Moderate left and mild right-sided hydronephrosis without hydroureter.   Decompressed urinary bladder with a Valencia catheter in place. No   obstructive ureterolithiasis.    Cardiomegaly and right pleural effusion.      < end of copied text >  < from: CT Head No Cont (22 @ 14:30) >  Impression: Dolichoectatic changes are seen involving both distal   vertebral and basilar arteries.    < end of copied text >      
Chief complain/HPI  Pt is an 88 yo M w PMH unspecified arrhythmia on Eliquis, HTN, dementia, admitted for AMS and acute hematuria. As per wife who was at bedside, pt has had painless hematuria since yesterday. He has not had any prior hx of such an episode. Wife also states he has been a little more confused for the past few days than usual, but states that over the past month he has been "forgetting" more things. States that pt has been compliant with medications, and has not been to a hospital in many years, but has had multiple surgeries in the past. Pt himself is a poor historian, and wife provided most of the history.  CT showed bilateral hydronephrosis. Vogel cathter was placed before CT , found with hematuria and urinary retention and was placed on CBI.    PAST MEDICAL & SURGICAL HISTORY:  Hematuria    HTN (hypertension)    Arrhythmia    Visual impairment    Dementia      TERENCE (acute kidney injury)          Home Medications Reviewed  * Patient Currently Takes Medications as of 2022 19:23 documented in Structured Notes  · 	Eliquis 5 mg oral tablet: 1 tab(s) orally 2 times a day  · 	losartan 50 mg oral tablet: 1 tab(s) orally once a day  · 	metoprolol succinate 100 mg oral tablet, extended release: 1 tab(s) orally once a day  · 	metoprolol succinate 25 mg oral tablet, extended release: 1 tab(s) orally once a day  · 	PreserVision AREDS 2 oral tablet, chewable: 1 tab(s) orally 2 times a day  · 	Centrum Silver oral tablet: 1 tab(s) orally once a day  · 	Vitamin C:   · 	Vitamin D3:   Hospital Medications:   MEDICATIONS  (STANDING):  cefTRIAXone   IVPB 1000 milliGRAM(s) IV Intermittent every 24 hours  lactated ringers. 1000 milliLiter(s) (85 mL/Hr) IV Continuous <Continuous>  metoprolol tartrate 50 milliGRAM(s) Oral two times a day  metoprolol tartrate 12.5 milliGRAM(s) Oral two times a day  multivitamin/minerals 1 Tablet(s) Oral daily  oxybutynin 5 milliGRAM(s) Oral three times a day    MEDICATIONS  (PRN):      Allergies    No Known Allergies    Intolerances      Creatinine, Serum: 2.15 mg/dL (11.03.22 @ 13:35) Blood Urea Nitrogen, Serum: 42 mg/dL                         11.7   11.64 )-----------( 231      ( 2022 07:45 )             35.9     11    139  |  107  |  42<H>  ----------------------------<  114<H>  4.5   |  24  |  1.73<H>    Ca    9.1      2022 07:45  Phos  3.1       Mg     1.9         TPro  7.5  /  Alb  2.7<L>  /  TBili  0.7  /  DBili  x   /  AST  23  /  ALT  17  /  AlkPhos  100      PT/INR - ( 2022 13:35 )   PT: 26.6 sec;   INR: 2.22 ratio         PTT - ( 2022 13:35 )  PTT:39.9 sec  Urinalysis Basic - ( 2022 13:58 )    Color: Red / Appearance: bloody / S.010 / pH: x  Gluc: x / Ketone: Trace  / Bili: Negative / Urobili: Negative   Blood: x / Protein: 500 mg/dL / Nitrite: Negative   Leuk Esterase: Trace / RBC: >50 /HPF / WBC 0-2 /HPF   Sq Epi: x / Non Sq Epi: x / Bacteria: Negative /HPF            RADIOLOGY & ADDITIONAL STUDIES:    SOCIAL HISTORY: Denies ETOh,Smoking,     FAMILY HISTORY:      REVIEW OF SYSTEMS:  Dementia, awake able to talk with out comprehension.    VITALS:  Vital Signs Last 24 Hrs  T(C): 36.9 (2022 05:43), Max: 36.9 (2022 05:43)  T(F): 98.4 (2022 05:43), Max: 98.4 (2022 05:43)  HR: 62 (2022 05:43) (62 - 85)  BP: 115/64 (2022 05:43) (115/64 - 147/93)  BP(mean): --  RR: 18 (2022 05:43) (18 - 18)  SpO2: 96% (2022 05:43) (96% - 100%)    Parameters below as of 2022 05:43  Patient On (Oxygen Delivery Method): room air         @ 07: @ 07:00  --------------------------------------------------------  IN: 6000 mL / OUT: 5900 mL / NET: 100 mL     @ 07: @ 12:43  --------------------------------------------------------  IN: 74673 mL / OUT: 77489 mL / NET: -100 mL          PHYSICAL EXAM:  Constitutional: NAD  HEENT: anicteric sclera, oropharynx clear, MMM  Neck: No JVD  Respiratory:  air entrance B/L, no wheezes, rales or rhonchi  Cardiovascular: S1, S2, RRR, no pericardial rub, no murmur  Gastrointestinal: BS+, soft, no tenderness, no distension, no bruit  Pelvis: bladder non-distended, no CVA tenderness  Extremities: No cyanosis or clubbing. No peripheral edema  Follow simple commends, not oriented to time , people and place.

## 2022-11-04 NOTE — PROGRESS NOTE ADULT - PROBLEM SELECTOR PLAN 2
SCr 2.15 (unk baseline)  Ct demonstrated Bilateral hydronephrosis  Suggestive of obstructive uropathy in setting of urinary retention  Trend SCr, repeat BMP  -Dr Bailey nephro, appreciate recs  -gentle hydration, 80 cc/hr LR

## 2022-11-04 NOTE — PATIENT PROFILE ADULT - FALL HARM RISK - HARM RISK INTERVENTIONS

## 2022-11-04 NOTE — PROGRESS NOTE ADULT - PROBLEM SELECTOR PLAN 5
not on medications at home  -continues to try to pull valencia and PIV out  -delirium and dementia precautions  -unrestrained mittens  -olanzapine prn

## 2022-11-05 LAB
ANION GAP SERPL CALC-SCNC: 8 MMOL/L — SIGNIFICANT CHANGE UP (ref 5–17)
BUN SERPL-MCNC: 32 MG/DL — HIGH (ref 7–18)
CALCIUM SERPL-MCNC: 8.8 MG/DL — SIGNIFICANT CHANGE UP (ref 8.4–10.5)
CHLORIDE SERPL-SCNC: 105 MMOL/L — SIGNIFICANT CHANGE UP (ref 96–108)
CO2 SERPL-SCNC: 24 MMOL/L — SIGNIFICANT CHANGE UP (ref 22–31)
CREAT SERPL-MCNC: 1.65 MG/DL — HIGH (ref 0.5–1.3)
EGFR: 39 ML/MIN/1.73M2 — LOW
GLUCOSE SERPL-MCNC: 102 MG/DL — HIGH (ref 70–99)
HCT VFR BLD CALC: 34.7 % — LOW (ref 39–50)
HGB BLD-MCNC: 11.1 G/DL — LOW (ref 13–17)
MCHC RBC-ENTMCNC: 29.4 PG — SIGNIFICANT CHANGE UP (ref 27–34)
MCHC RBC-ENTMCNC: 32 GM/DL — SIGNIFICANT CHANGE UP (ref 32–36)
MCV RBC AUTO: 91.8 FL — SIGNIFICANT CHANGE UP (ref 80–100)
NRBC # BLD: 0 /100 WBCS — SIGNIFICANT CHANGE UP (ref 0–0)
PLATELET # BLD AUTO: 264 K/UL — SIGNIFICANT CHANGE UP (ref 150–400)
POTASSIUM SERPL-MCNC: 4 MMOL/L — SIGNIFICANT CHANGE UP (ref 3.5–5.3)
POTASSIUM SERPL-SCNC: 4 MMOL/L — SIGNIFICANT CHANGE UP (ref 3.5–5.3)
RBC # BLD: 3.78 M/UL — LOW (ref 4.2–5.8)
RBC # FLD: 13.8 % — SIGNIFICANT CHANGE UP (ref 10.3–14.5)
SODIUM SERPL-SCNC: 137 MMOL/L — SIGNIFICANT CHANGE UP (ref 135–145)
WBC # BLD: 12.41 K/UL — HIGH (ref 3.8–10.5)
WBC # FLD AUTO: 12.41 K/UL — HIGH (ref 3.8–10.5)

## 2022-11-05 RX ORDER — ACETAMINOPHEN 500 MG
650 TABLET ORAL EVERY 6 HOURS
Refills: 0 | Status: DISCONTINUED | OUTPATIENT
Start: 2022-11-05 | End: 2022-11-15

## 2022-11-05 RX ADMIN — Medication 5 MILLIGRAM(S): at 05:16

## 2022-11-05 RX ADMIN — Medication 12.5 MILLIGRAM(S): at 05:15

## 2022-11-05 RX ADMIN — Medication 650 MILLIGRAM(S): at 19:32

## 2022-11-05 RX ADMIN — Medication 650 MILLIGRAM(S): at 20:32

## 2022-11-05 RX ADMIN — Medication 5 MILLIGRAM(S): at 22:55

## 2022-11-05 RX ADMIN — Medication 1 TABLET(S): at 16:13

## 2022-11-05 RX ADMIN — CEFTRIAXONE 100 MILLIGRAM(S): 500 INJECTION, POWDER, FOR SOLUTION INTRAMUSCULAR; INTRAVENOUS at 05:15

## 2022-11-05 RX ADMIN — Medication 50 MILLIGRAM(S): at 05:15

## 2022-11-05 NOTE — PROGRESS NOTE ADULT - SUBJECTIVE AND OBJECTIVE BOX
INTERVAL HPI/OVERNIGHT EVENTS:  Patient seen with monthly f/up visit,events noticed   VITAL SIGNS:  T(F): 97.8 (22 @ 04:57)  HR: 75 (22 @ 04:57)  BP: 121/75 (22 @ 04:57)  RR: 18 (22 @ 04:57)  SpO2: 97% (22 @ 04:57)  Wt(kg): --    PHYSICAL EXAM:  awake,confused  Constitutional:  Eyes:  ENMT:perrla  Neck:  Respiratory:clear  Cardiovascular:s12,m-none  Gastrointestinal:soft,bvs pos,valencia in place  Extremities:  Vascular:  Neurological:no focal deficit  Musculoskeletal:    MEDICATIONS  (STANDING):  cefTRIAXone   IVPB 1000 milliGRAM(s) IV Intermittent every 24 hours  influenza  Vaccine (HIGH DOSE) 0.7 milliLiter(s) IntraMuscular once  lactated ringers. 1000 milliLiter(s) (85 mL/Hr) IV Continuous <Continuous>  metoprolol tartrate 50 milliGRAM(s) Oral two times a day  metoprolol tartrate 12.5 milliGRAM(s) Oral two times a day  multivitamin/minerals 1 Tablet(s) Oral daily  oxybutynin 5 milliGRAM(s) Oral three times a day    MEDICATIONS  (PRN):  OLANZapine 2.5 milliGRAM(s) Oral every 8 hours PRN agitation      Allergies    No Known Allergies    Intolerances        LABS:                        11.   12.41 )-----------( 264      ( 2022 06:54 )             34.7         137  |  105  |  32<H>  ----------------------------<  102<H>  4.0   |  24  |  1.65<H>    Ca    8.8      2022 06:54  Phos  3.1       Mg     1.9         TPro  7.5  /  Alb  2.7<L>  /  TBili  0.7  /  DBili  x   /  AST  23  /  ALT  17  /  AlkPhos  100  -    PT/INR - ( 2022 13:35 )   PT: 26.6 sec;   INR: 2.22 ratio         PTT - ( 2022 13:35 )  PTT:39.9 sec  Urinalysis Basic - ( 2022 13:58 )    Color: Red / Appearance: bloody / S.010 / pH: x  Gluc: x / Ketone: Trace  / Bili: Negative / Urobili: Negative   Blood: x / Protein: 500 mg/dL / Nitrite: Negative   Leuk Esterase: Trace / RBC: >50 /HPF / WBC 0-2 /HPF   Sq Epi: x / Non Sq Epi: x / Bacteria: Negative /HPF        RADIOLOGY & ADDITIONAL TESTS:      Assessment and Plan:   · Assessment	  Pt is an 89 yo M w PMH unspecified cardiac arrhythmia on eliquis, HTN, dementia, p/w to hospital with AMS and acute hematuria x 1 day. Valencia Catheter placed in ED with 2 L of hematuria with minimal clots noted on. pt admitted to medicine for acute hematuria. Ct abd notable for moderate left and mild right hydronephrosis with hydroureter. continues with CBI, has intermittent clots noted. urology following.        Problem/Plan - 1:  ·  Problem: Hematuria.   ·  Plan: Pt presenting with acute hematuria, and urinary retention.  - Bilateral hydronephrosis  Suggestive of obstructive uropathy in setting of urinary retention  -CBI started by urology  -As per urology recs, oxybutynin 5 mg TID for bladder spasm, empiric CTX, and belladonna/opiate suppository 15 mg TID if available  -Pt would benefit from CT urogram, however elevated SCr is contraindication for contrast  -o/p cystoscopy  -monitor cbc  - type and screen ordered, transfuse if Hb <7  -monitor BMP for electrolyte shifts as pt had large amount of urine drained.   -maintenance fluids ordered  -Urology feeling.     Problem/Plan - 2:  ·  Problem: TERENCE (acute kidney injury)-improving gradually  Ct demonstrated Bilateral hydronephrosis  Suggestive of obstructive uropathy in setting of urinary retention  Trend SCr, repeat BMP  -Dr Lynn mcgee, appreciate recs  -gentle hydration, 80 cc/hr LR.     Problem/Plan - 3:  ·  Problem: HTN (hypertension).   ·  Plan: pt takes metoprolol succ 100 mg + 25 mg daily, as well as losartan daily  cont home meds w parameters.     Problem/Plan - 4:  ·  Problem: Arrhythmia.   ·  Plan: pt takes eliquis for unknown cardiac arrhythmia  hold for hematuria.     Problem/Plan - 5:  ·  Problem: Dementia.   ·  Plan: not on medications at home  -continues to try to pull valencia and PIV out  -delirium and dementia precautions  -unrestrained mittens  -olanzapine prn.     Problem/Plan - 6:  ·  Problem: Prophylactic measure.   ·  Plan: -SCD.     Problem/Plan - 7:  ·  Problem: Discharge planning issues.   ·  Plan: -pt lives at home with wife  -spoke with wife regarding plan of care, questions/concerns addressed  -wife states shes primary caregiver of pt, however may not be able to continue to do so, thinking about possible LTC placement.   -PT eval pending.

## 2022-11-05 NOTE — PROGRESS NOTE ADULT - ASSESSMENT
TERENCE post renal due to neurogenic versus obstructive uropathy  Macroscopic hematuria with un clear source.  Renal function improved after valencia catheter placement.    Continue IV fluids and follow daily electrolytes  Urology follow up , plan for cystoscopy. Oxybutynin as per urology.  Monitor H/H .

## 2022-11-05 NOTE — PROGRESS NOTE ADULT - ASSESSMENT
89yoM with h/o HTN, dementia, on Eliquis 5mg BID, presents with wife with hematuria. TERENCE, Hgb stable, Hematuria improved after irrigation. However was obstructed on arrival in ED with significant urine output after valencia placement. Admitted to medicine. U/A with blood but only trace LE and negative bacteria. Urine color this morning was clear light pink on slow drip  - Recommend monitoring BMP for post obstructive diuresis  - Monitor urine color, now on CBI, wean as tolerated (consider clamp trial this PM)  - Monitor H&H - stable  - Continue holding eliquis  - F/u urine culture  - Bilateral hydronephrosis and TERENCE possibly due to obstructive uropathy in setting of urinary retention --> Monitor Cr now that catheter is in (improved to 1.65)  - Repeat renal u/s in 3-5 days to see if hydronephrosis improves  - Patient will need outpatient cystoscopy and CT urogram for gross hematuria workup  - Continue with CTX  - Continue with ditropan and B&O suppositories for bladder spasms

## 2022-11-05 NOTE — PROGRESS NOTE ADULT - SUBJECTIVE AND OBJECTIVE BOX
Problem List:  TERENCE , OBSTRUCTIVE UROPATHY    PAST MEDICAL & SURGICAL HISTORY:  Hematuria      HTN (hypertension)      Arrhythmia      Visual impairment      Dementia      TERENCE (acute kidney injury)          No Known Allergies      MEDICATIONS  (STANDING):  cefTRIAXone   IVPB 1000 milliGRAM(s) IV Intermittent every 24 hours  influenza  Vaccine (HIGH DOSE) 0.7 milliLiter(s) IntraMuscular once  lactated ringers. 1000 milliLiter(s) (85 mL/Hr) IV Continuous <Continuous>  metoprolol tartrate 50 milliGRAM(s) Oral two times a day  metoprolol tartrate 12.5 milliGRAM(s) Oral two times a day  multivitamin/minerals 1 Tablet(s) Oral daily  oxybutynin 5 milliGRAM(s) Oral three times a day    MEDICATIONS  (PRN):  OLANZapine 2.5 milliGRAM(s) Oral every 8 hours PRN agitation                            11.1   12.41 )-----------( 264      ( 05 Nov 2022 06:54 )             34.7     11-05    137  |  105  |  32<H>  ----------------------------<  102<H>  4.0   |  24  |  1.65<H>    Ca    8.8      05 Nov 2022 06:54  Phos  3.1     11-04  Mg     1.9     11-04    TPro  7.5  /  Alb  2.7<L>  /  TBili  0.7  /  DBili  x   /  AST  23  /  ALT  17  /  AlkPhos  100  11-04            REVIEW OF SYSTEMS:  DEMENTIA        VITALS:  T(F): 97.8 (11-05-22 @ 04:57), Max: 97.8 (11-05-22 @ 04:57)  HR: 66 (11-05-22 @ 13:56)  BP: 113/76 (11-05-22 @ 13:56)  RR: 18 (11-05-22 @ 13:56)  SpO2: 95% (11-05-22 @ 13:56)  Wt(kg): --    11-04 @ 07:01  -  11-05 @ 07:00  --------------------------------------------------------  IN: 41189 mL / OUT: 46692 mL / NET: 4050 mL    11-05 @ 08:01  -  11-05 @ 17:00  --------------------------------------------------------  IN: 21543 mL / OUT: 45469 mL / NET: -1000 mL        PHYSICAL EXAM:  Constitutional: well developed, no diaphoresis, no distress.  Neck: No JVD, no carotid bruit, supple, no adenopathy  Respiratory: Good air entrance B/L, no wheezes, rales or rhonchi  Cardiovascular: S1, S2, RRR, no pericardial rub, no murmur  Abdomen: BS+, soft, no tenderness, no bruit  Pelvis: bladder nondistended  Extremities: No cyanosis or clubbing. No peripheral edema.   Confused

## 2022-11-05 NOTE — CHART NOTE - NSCHARTNOTEFT_GEN_A_CORE
Called by bedside RN for axillary temp of 101.    /59  RR 18  HR 60  Axil temp 101.    Has CBI running, urine pink tinged, no clots.  Skin feels warm.  Pt. in no acute distress    Wife was at bedside earlier said she didn't feel well, has hoarse voice, went to her PCP who rec rest/conservative measures. She did not complain of fever      1) Fever: Unknown origin, has not had blood cultures drawn this admission, BC drawn x 2.  Do not suspect would have much yield from urine since CBI has been running and has indwelling valencia.   Conservative management - Tylenol suppository, cont IVF, on rocephin given urine issues.      Donovan Forbes NP

## 2022-11-05 NOTE — PROGRESS NOTE ADULT - SUBJECTIVE AND OBJECTIVE BOX
Patient seen and examined at bedside  CBI on slow drip    Vital Signs Last 24 Hrs  T(F): 97.8 (11-05-22 @ 04:57), Max: 98.2 (11-04-22 @ 14:48)  HR: 75 (11-05-22 @ 04:57)  BP: 121/75 (11-05-22 @ 04:57)  RR: 18 (11-05-22 @ 04:57)  SpO2: 97% (11-05-22 @ 04:57)    GENERAL: Alert, NAD  CHEST/LUNG: respirations nonlabored  ABDOMEN: soft, Nontender, Nondistended  : valencia in place, CBI on slow drip with light pink/fruit punch colored urine    I&O's Detail    04 Nov 2022 07:01  -  05 Nov 2022 07:00  --------------------------------------------------------  IN:    Continuous Bladder Irrigation (mL): 38116 mL    IV PiggyBack: 100 mL    Lactated Ringers: 400 mL  Total IN: 25561 mL    OUT:    Continuous Bladder Irrigation (mL): 36339 mL  Total OUT: 36438 mL    Total NET: 4050 mL      05 Nov 2022 08:01  -  05 Nov 2022 10:44  --------------------------------------------------------  IN:  Total IN: 0 mL    OUT:    Continuous Bladder Irrigation (mL): 2500 mL  Total OUT: 2500 mL    Total NET: -2500 mL    LABS:                        11.1   12.41 )-----------( 264      ( 05 Nov 2022 06:54 )             34.7     11-05    137  |  105  |  32<H>  ----------------------------<  102<H>  4.0   |  24  |  1.65<H>    Ca    8.8      05 Nov 2022 06:54  Phos  3.1     11-04  Mg     1.9     11-04    TPro  7.5  /  Alb  2.7<L>  /  TBili  0.7  /  DBili  x   /  AST  23  /  ALT  17  /  AlkPhos  100  11-04    PT/INR - ( 03 Nov 2022 13:35 )   PT: 26.6 sec;   INR: 2.22 ratio      PTT - ( 03 Nov 2022 13:35 )  PTT:39.9 sec

## 2022-11-06 LAB
ANION GAP SERPL CALC-SCNC: 9 MMOL/L — SIGNIFICANT CHANGE UP (ref 5–17)
BUN SERPL-MCNC: 32 MG/DL — HIGH (ref 7–18)
CALCIUM SERPL-MCNC: 8.7 MG/DL — SIGNIFICANT CHANGE UP (ref 8.4–10.5)
CHLORIDE SERPL-SCNC: 106 MMOL/L — SIGNIFICANT CHANGE UP (ref 96–108)
CO2 SERPL-SCNC: 23 MMOL/L — SIGNIFICANT CHANGE UP (ref 22–31)
CREAT SERPL-MCNC: 1.56 MG/DL — HIGH (ref 0.5–1.3)
EGFR: 42 ML/MIN/1.73M2 — LOW
GLUCOSE SERPL-MCNC: 95 MG/DL — SIGNIFICANT CHANGE UP (ref 70–99)
HCT VFR BLD CALC: 32.3 % — LOW (ref 39–50)
HGB BLD-MCNC: 10.5 G/DL — LOW (ref 13–17)
MCHC RBC-ENTMCNC: 29.7 PG — SIGNIFICANT CHANGE UP (ref 27–34)
MCHC RBC-ENTMCNC: 32.5 GM/DL — SIGNIFICANT CHANGE UP (ref 32–36)
MCV RBC AUTO: 91.2 FL — SIGNIFICANT CHANGE UP (ref 80–100)
NRBC # BLD: 0 /100 WBCS — SIGNIFICANT CHANGE UP (ref 0–0)
PLATELET # BLD AUTO: 207 K/UL — SIGNIFICANT CHANGE UP (ref 150–400)
POTASSIUM SERPL-MCNC: 3.7 MMOL/L — SIGNIFICANT CHANGE UP (ref 3.5–5.3)
POTASSIUM SERPL-SCNC: 3.7 MMOL/L — SIGNIFICANT CHANGE UP (ref 3.5–5.3)
RBC # BLD: 3.54 M/UL — LOW (ref 4.2–5.8)
RBC # FLD: 13.9 % — SIGNIFICANT CHANGE UP (ref 10.3–14.5)
SODIUM SERPL-SCNC: 138 MMOL/L — SIGNIFICANT CHANGE UP (ref 135–145)
WBC # BLD: 9.78 K/UL — SIGNIFICANT CHANGE UP (ref 3.8–10.5)
WBC # FLD AUTO: 9.78 K/UL — SIGNIFICANT CHANGE UP (ref 3.8–10.5)

## 2022-11-06 RX ADMIN — SODIUM CHLORIDE 85 MILLILITER(S): 9 INJECTION, SOLUTION INTRAVENOUS at 12:47

## 2022-11-06 RX ADMIN — Medication 5 MILLIGRAM(S): at 05:43

## 2022-11-06 RX ADMIN — Medication 5 MILLIGRAM(S): at 15:32

## 2022-11-06 RX ADMIN — Medication 5 MILLIGRAM(S): at 21:39

## 2022-11-06 RX ADMIN — OLANZAPINE 2.5 MILLIGRAM(S): 15 TABLET, FILM COATED ORAL at 21:39

## 2022-11-06 RX ADMIN — CEFTRIAXONE 100 MILLIGRAM(S): 500 INJECTION, POWDER, FOR SOLUTION INTRAMUSCULAR; INTRAVENOUS at 01:39

## 2022-11-06 RX ADMIN — Medication 12.5 MILLIGRAM(S): at 18:25

## 2022-11-06 RX ADMIN — Medication 1 TABLET(S): at 12:08

## 2022-11-06 RX ADMIN — Medication 50 MILLIGRAM(S): at 18:25

## 2022-11-06 NOTE — PROGRESS NOTE ADULT - SUBJECTIVE AND OBJECTIVE BOX
INTERVAL HPI/OVERNIGHT EVENTS:  Patient seen,events noticed,no acute issues  VITAL SIGNS:  T(F): 98.1 (11-06-22 @ 04:52)  HR: 61 (11-06-22 @ 04:52)  BP: 100/62 (11-06-22 @ 04:52)  RR: 18 (11-06-22 @ 04:52)  SpO2: 100% (11-06-22 @ 04:52)  Wt(kg): --    PHYSICAL EXAM:  awake  Constitutional:  Eyes:  ENMT:perrla  Neck:  Respiratory:  Cardiovascular:clear  Gastrointestinal:s1s2,m-none  Extremities:soft,bs pos  Vascular:  Neurological:no focal deficit  Musculoskeletal:    MEDICATIONS  (STANDING):  influenza  Vaccine (HIGH DOSE) 0.7 milliLiter(s) IntraMuscular once  lactated ringers. 1000 milliLiter(s) (85 mL/Hr) IV Continuous <Continuous>  metoprolol tartrate 50 milliGRAM(s) Oral two times a day  metoprolol tartrate 12.5 milliGRAM(s) Oral two times a day  multivitamin/minerals 1 Tablet(s) Oral daily  oxybutynin 5 milliGRAM(s) Oral three times a day    MEDICATIONS  (PRN):  acetaminophen  Suppository .. 650 milliGRAM(s) Rectal every 6 hours PRN Temp greater or equal to 38C (100.4F)  OLANZapine 2.5 milliGRAM(s) Oral every 8 hours PRN agitation      Allergies    No Known Allergies    Intolerances        LABS:                        10.5   9.78  )-----------( 207      ( 06 Nov 2022 05:20 )             32.3     11-06    138  |  106  |  32<H>  ----------------------------<  95  3.7   |  23  |  1.56<H>    Ca    8.7      06 Nov 2022 05:20            RADIOLOGY & ADDITIONAL TESTS:      Assessment and Plan:   · Assessment	  Pt is an 88 yo M w PMH unspecified cardiac arrhythmia on eliquis, HTN, dementia, p/w to hospital with AMS and acute hematuria x 1 day. Valencia Catheter placed in ED with 2 L of hematuria with minimal clots noted on. pt admitted to medicine for acute hematuria. Ct abd notable for moderate left and mild right hydronephrosis with hydroureter. continues with CBI, has intermittent clots noted. urology following.        Problem/Plan - 1:  ·  Problem: Hematuria.   ·Suggestive of obstructive uropathy in setting of urinary retention  -As per urology recs, oxybutynin 5 mg TID for bladder spasm, empiric CTX, and belladonna/opiate suppository 15 mg TID if available  -Pt would benefit from CT urogram  -pending   cystoscopy  -monitor cbc  - type and screen ordered, transfuse if Hb <7  -monitor BMP for electrolyte shifts as pt had large amount of urine drained.   -maintenance fluids ordered  -Urology feeling.     Problem/Plan - 2:  ·  Problem: TERENCE (acute kidney injury)-improving gradually  Ct demonstrated Bilateral hydronephrosis  Suggestive of obstructive uropathy in setting of urinary retention  Trend SCr, repeat BMP  -Dr Bailey nephелена, appreciate recs  -gentle hydration, 80 cc/hr LR.     Problem/Plan - 3:  ·  Problem: HTN (hypertension).   ·  Plan: pt takes metoprolol succ 100 mg + 25 mg daily, as well as losartan daily  cont home meds w parameters.     Problem/Plan - 4:  ·  Problem: Arrhythmia.   ·  Plan: pt takes eliquis for unknown cardiac arrhythmia  hold for hematuria.     Problem/Plan - 5:  ·  Problem: Dementia.   ·  Plan: not on medications at home  -continues to try to pull valencia and PIV out  -delirium and dementia precautions  -unrestrained mittens  -olanzapine prn.     Problem/Plan - 6:  ·  Problem: Prophylactic measure.   ·  Plan: -SCD.     Problem/Plan - 7:  ·  Problem: Discharge planning issues.   ·  Plan: -pt lives at home with wife  -spoke with wife regarding plan of care, questions/concerns addressed  -wife states shes primary caregiver of pt, however may not be able to continue to do so, thinking about possible LTC placement.   -PT eval pending.

## 2022-11-06 NOTE — PROGRESS NOTE ADULT - ASSESSMENT
89yoM with h/o HTN, dementia, on Eliquis 5mg BID, presents with wife with hematuria. TERENCE, Hgb stable, Hematuria improved after irrigation. However was obstructed on arrival in ED with significant urine output after valencia placement. Admitted to medicine. U/A with blood but only trace LE and negative bacteria. Urine color this morning was clear light pink on slow drip  - Recommend monitoring BMP for post obstructive diuresis  - Monitor urine color, now on CBI, wean as tolerated (consider clamp trial this PM)  - Monitor H&H - stable  - Continue holding eliquis  - F/u urine culture  - Bilateral hydronephrosis and TERENCE possibly due to obstructive uropathy in setting of urinary retention   - Repeat renal u/s in 3-5 days to see if hydronephrosis improves  - Patient will need outpatient cystoscopy and CT urogram for gross hematuria workup  - Continue with CTX  - Continue with ditropan and B&O suppositories for bladder spasms

## 2022-11-06 NOTE — PROGRESS NOTE ADULT - SUBJECTIVE AND OBJECTIVE BOX
Problem List:  TERENCE post renal. obstructive uropathy and macroscopic hematuria    PAST MEDICAL & SURGICAL HISTORY:  Hematuria      HTN (hypertension)      Arrhythmia      Visual impairment      Dementia      TERENCE (acute kidney injury)          No Known Allergies      MEDICATIONS  (STANDING):  influenza  Vaccine (HIGH DOSE) 0.7 milliLiter(s) IntraMuscular once  lactated ringers. 1000 milliLiter(s) (85 mL/Hr) IV Continuous <Continuous>  metoprolol tartrate 50 milliGRAM(s) Oral two times a day  metoprolol tartrate 12.5 milliGRAM(s) Oral two times a day  multivitamin/minerals 1 Tablet(s) Oral daily  oxybutynin 5 milliGRAM(s) Oral three times a day    MEDICATIONS  (PRN):  acetaminophen  Suppository .. 650 milliGRAM(s) Rectal every 6 hours PRN Temp greater or equal to 38C (100.4F)  OLANZapine 2.5 milliGRAM(s) Oral every 8 hours PRN agitation             Creatinine, Serum: 1.90 mg/dL (11.03.22 @ 21:54)                10.5   9.78  )-----------( 207      ( 06 Nov 2022 05:20 )             32.3     11-06    138  |  106  |  32<H>  ----------------------------<  95  3.7   |  23  |  1.56<H>    Ca    8.7      06 Nov 2022 05:20              REVIEW OF SYSTEMS:  dementia.        VITALS:  T(F): 98.1 (11-06-22 @ 04:52), Max: 101 (11-05-22 @ 18:35)  HR: 61 (11-06-22 @ 04:52)  BP: 100/62 (11-06-22 @ 04:52)  RR: 18 (11-06-22 @ 04:52)  SpO2: 100% (11-06-22 @ 04:52)  Wt(kg): --    11-05 @ 08:01  -  11-06 @ 07:00  --------------------------------------------------------  IN: 45344 mL / OUT: 96920 mL / NET: 750 mL    11-06 @ 07:01  -  11-06 @ 10:54  --------------------------------------------------------  IN: 6000 mL / OUT: 0 mL / NET: 6000 mL        PHYSICAL EXAM:  Constitutional: , no diaphoresis, no distress.  Neck: No JVD, no carotid bruit, supple, no adenopathy  Respiratory:  air entrance B/L, no wheezes, rales or rhonchi  Cardiovascular: S1, S2, RRR, no pericardial rub, no murmur  Abdomen: BS+, soft, no tenderness, no bruit  Pelvis: bladder nondistended, valencia catheter  Extremities: No cyanosis or clubbing. No peripheral edema.     confused

## 2022-11-06 NOTE — PROGRESS NOTE ADULT - SUBJECTIVE AND OBJECTIVE BOX
Patient seen and examined at bedside    Vital Signs Last 24 Hrs  T(F): 98.1 (11-06-22 @ 04:52), Max: 101 (11-05-22 @ 18:35)  HR: 61 (11-06-22 @ 04:52)  BP: 100/62 (11-06-22 @ 04:52)  RR: 18 (11-06-22 @ 04:52)  SpO2: 100% (11-06-22 @ 04:52)    GENERAL: Alert, NAD  CHEST/LUNG: respirations nonlabored  ABDOMEN: soft, Nontender, Nondistended  : CBI running wide open at time of exam, one bag clamped and decreased to slow drip    I&O's Detail    05 Nov 2022 08:01  -  06 Nov 2022 07:00  --------------------------------------------------------  IN:    Continuous Bladder Irrigation (mL): 56450 mL  Total IN: 60734 mL    OUT:    Continuous Bladder Irrigation (mL): 91185 mL  Total OUT: 93871 mL    Total NET: 750 mL    06 Nov 2022 07:01  -  06 Nov 2022 09:40  --------------------------------------------------------  IN:    Continuous Bladder Irrigation (mL): 6000 mL  Total IN: 6000 mL    OUT:  Total OUT: 0 mL    Total NET: 6000 mL    LABS:                        10.5   9.78  )-----------( 207      ( 06 Nov 2022 05:20 )             32.3     11-06    138  |  106  |  32<H>  ----------------------------<  95  3.7   |  23  |  1.56<H>    Ca    8.7      06 Nov 2022 05:20

## 2022-11-07 DIAGNOSIS — N13.30 UNSPECIFIED HYDRONEPHROSIS: ICD-10-CM

## 2022-11-07 DIAGNOSIS — N13.9 OBSTRUCTIVE AND REFLUX UROPATHY, UNSPECIFIED: ICD-10-CM

## 2022-11-07 LAB
ANION GAP SERPL CALC-SCNC: 8 MMOL/L — SIGNIFICANT CHANGE UP (ref 5–17)
BUN SERPL-MCNC: 28 MG/DL — HIGH (ref 7–18)
CALCIUM SERPL-MCNC: 8.8 MG/DL — SIGNIFICANT CHANGE UP (ref 8.4–10.5)
CHLORIDE SERPL-SCNC: 102 MMOL/L — SIGNIFICANT CHANGE UP (ref 96–108)
CO2 SERPL-SCNC: 26 MMOL/L — SIGNIFICANT CHANGE UP (ref 22–31)
CREAT SERPL-MCNC: 1.39 MG/DL — HIGH (ref 0.5–1.3)
EGFR: 48 ML/MIN/1.73M2 — LOW
GLUCOSE SERPL-MCNC: 100 MG/DL — HIGH (ref 70–99)
HCT VFR BLD CALC: 32 % — LOW (ref 39–50)
HGB BLD-MCNC: 10.5 G/DL — LOW (ref 13–17)
MCHC RBC-ENTMCNC: 29.9 PG — SIGNIFICANT CHANGE UP (ref 27–34)
MCHC RBC-ENTMCNC: 32.8 GM/DL — SIGNIFICANT CHANGE UP (ref 32–36)
MCV RBC AUTO: 91.2 FL — SIGNIFICANT CHANGE UP (ref 80–100)
NRBC # BLD: 0 /100 WBCS — SIGNIFICANT CHANGE UP (ref 0–0)
PLATELET # BLD AUTO: 233 K/UL — SIGNIFICANT CHANGE UP (ref 150–400)
POTASSIUM SERPL-MCNC: 3.5 MMOL/L — SIGNIFICANT CHANGE UP (ref 3.5–5.3)
POTASSIUM SERPL-SCNC: 3.5 MMOL/L — SIGNIFICANT CHANGE UP (ref 3.5–5.3)
RBC # BLD: 3.51 M/UL — LOW (ref 4.2–5.8)
RBC # FLD: 13.9 % — SIGNIFICANT CHANGE UP (ref 10.3–14.5)
SODIUM SERPL-SCNC: 136 MMOL/L — SIGNIFICANT CHANGE UP (ref 135–145)
WBC # BLD: 9.39 K/UL — SIGNIFICANT CHANGE UP (ref 3.8–10.5)
WBC # FLD AUTO: 9.39 K/UL — SIGNIFICANT CHANGE UP (ref 3.8–10.5)

## 2022-11-07 RX ORDER — ACETAMINOPHEN 500 MG
650 TABLET ORAL EVERY 6 HOURS
Refills: 0 | Status: DISCONTINUED | OUTPATIENT
Start: 2022-11-07 | End: 2022-11-15

## 2022-11-07 RX ADMIN — Medication 1 TABLET(S): at 11:31

## 2022-11-07 RX ADMIN — Medication 50 MILLIGRAM(S): at 06:00

## 2022-11-07 RX ADMIN — Medication 12.5 MILLIGRAM(S): at 06:00

## 2022-11-07 RX ADMIN — Medication 5 MILLIGRAM(S): at 14:32

## 2022-11-07 RX ADMIN — Medication 650 MILLIGRAM(S): at 11:43

## 2022-11-07 RX ADMIN — SODIUM CHLORIDE 85 MILLILITER(S): 9 INJECTION, SOLUTION INTRAVENOUS at 11:10

## 2022-11-07 RX ADMIN — Medication 650 MILLIGRAM(S): at 12:21

## 2022-11-07 RX ADMIN — Medication 5 MILLIGRAM(S): at 06:00

## 2022-11-07 RX ADMIN — Medication 5 MILLIGRAM(S): at 22:47

## 2022-11-07 NOTE — PHYSICAL THERAPY INITIAL EVALUATION ADULT - ACTIVE RANGE OF MOTION EXAMINATION, REHAB EVAL
never bilateral upper extremity Active ROM was WFL (within functional limits)/bilateral  lower extremity Active ROM was WFL (within functional limits)

## 2022-11-07 NOTE — PHYSICAL THERAPY INITIAL EVALUATION ADULT - SHORT TERM MEMORY, REHAB EVAL
MEDICATIONS:  · See Medication List (bring to your doctor appointments).    VACCINES:  Most Recent Immunizations   Administered Date(s) Administered   • Influenza, high dose quadrivalent, preservative-free 10/07/2020   • Influenza, high dose seasonal, preservative-free 09/26/2019   • Influenza, intradermal, quadrivalent, preservative free 09/02/2016   • Influenza, seasonal, injectable, trivalent 10/30/2015   • Novel Influenza [id number], Unspecified Formulation 01/21/2010   • Pneumococcal Conjugate 13 valent 10/10/2014   • Pneumococcal polysaccharide, adult, 23 valent 09/28/2011   • Tdap 05/18/2011   • Zoster Shingles 04/18/2013       ACTIVITY:  · Weigh yourself daily (first thing in the morning, with same amount of clothes on) unless told otherwise by your doctor.  · Continue activity as you were in the hospital, slowly increase to what you were doing previously.  · Up as desired / no restrictions.    SMOKING:  · Avoid all tobacco products and secondhand smoke.  · Smoking Cessation Counseling offered.  · [place] Toll Free Quit Line: [phone]    DIET:  · Limit salt and salty foods unless told otherwise by your doctor.    · Trouble breathing or chest pain - CALL 911    CONTACT YOUR DOCTOR IF:  · You have symptoms that are not \"normal\" for you.  · You have new or worse symptoms or pain, not relieved by medicine or rest.  · Temperature greater than 101 degrees F, chills or flu like symptoms.    
intact

## 2022-11-07 NOTE — PROGRESS NOTE ADULT - PROBLEM SELECTOR PLAN 4
pt takes eliquis for unknown cardiac arrhythmia  hold for hematuria Improving  -Urology following  -CBI continues for now  -Not tolerating decreased CBI rate ate this time  -H/H stable  -Cont holding AC  -Cont oxybutynin 5 mg TID for bladder spasm  -OP cystoscopy  -Pt would benefit from CT urogram, however elevated SCr is contraindication for contrast Brow Lift Text: A midfrontal incision was made medially to the defect to allow access to the tissues just superior to the left eyebrow. Following careful dissection inferiorly in a supraperiosteal plane to the level of the left eyebrow, several 3-0 monocryl sutures were used to resuspend the eyebrow orbicularis oculi muscular unit to the superior frontal bone periosteum. This resulted in an appropriate reapproximation of static eyebrow symmetry and correction of the left brow ptosis.

## 2022-11-07 NOTE — PROGRESS NOTE ADULT - PROBLEM SELECTOR PLAN 1
Pt presenting with acute hematuria, and urinary retention.  approx 2 L urine drained with valencia  CT demonstrated Bilateral hydronephrosis  SCr 2.15 (unk baseline) improved today, 1.73  Suggestive of obstructive uropathy in setting of urinary retention  -CBI started by urology  -As per urology recs, oxybutynin 5 mg TID for bladder spasm, empiric CTX, and belladonna/opiate suppository 15 mg TID if available  -Pt would benefit from CT urogram, however elevated SCr is contraindication for contrast  -o/p cystoscopy  -monitor cbc  - type and screen ordered, transfuse if Hb <7  -monitor BMP for electrolyte shifts as pt had large amount of urine drained.   -maintenance fluids ordered  -Urology feeling p/w gross hematuria  -CT A/P shows Moderate left and mild right-sided hydronephrosis without hydroureter.    No obstructive ureterolithiasis.  -Urology following, note and reccs appreciated  -f/u renal US to see if hydronephrosis improved  -OP cystoscopy and CT urogram

## 2022-11-07 NOTE — PROGRESS NOTE ADULT - PROBLEM SELECTOR PLAN 9
-pt lives at home with wife  -Pt.'s wife and daughter updated re pt.'s condition and plan of care  -PT recc KODY  -Potentially LTC placement

## 2022-11-07 NOTE — PROGRESS NOTE ADULT - SUBJECTIVE AND OBJECTIVE BOX
INTERVAL HPI/OVERNIGHT EVENTS:    Pt seen and examined at bedside. No acute events overnight.     Vital Signs Last 24 Hrs  T(C): 36.5 (07 Nov 2022 04:53), Max: 37.3 (06 Nov 2022 20:50)  T(F): 97.7 (07 Nov 2022 04:53), Max: 99.2 (06 Nov 2022 20:50)  HR: 88 (07 Nov 2022 04:53) (61 - 90)  BP: 137/84 (07 Nov 2022 04:53) (117/72 - 137/84)  BP(mean): --  RR: 18 (07 Nov 2022 04:53) (18 - 18)  SpO2: 100% (07 Nov 2022 04:53) (98% - 100%)    Parameters below as of 07 Nov 2022 04:53  Patient On (Oxygen Delivery Method): room air      I&O's Detail    06 Nov 2022 07:01  -  07 Nov 2022 07:00  --------------------------------------------------------  IN:    Continuous Bladder Irrigation (mL): 10149 mL    Lactated Ringers: 765 mL  Total IN: 28657 mL    OUT:    Continuous Bladder Irrigation (mL): 74115 mL  Total OUT: 01629 mL    Total NET: -6235 mL        oxybutynin 5 milliGRAM(s) Oral three times a day      Physical Exam  General: No acute distress  Abdomen: soft, nondistended, nontender, no rebound tenderness, no guarding, no palpable masses  : Normal external genitalia, 3 way valencia catheter in place, CBI at mod gtt, UO clear, no clots seen       Labs:                        10.5   9.39  )-----------( 233      ( 07 Nov 2022 05:50 )             32.0     11-07    136  |  102  |  28<H>  ----------------------------<  100<H>  3.5   |  26  |  1.39<H>    Ca    8.8      07 Nov 2022 05:50

## 2022-11-07 NOTE — ADVANCED PRACTICE NURSE CONSULT - ASSESSMENT
This is a 89yr old male patient admitted for Hematuria, presenting with a Stage 1 Pressure Injury to the Coccyx, as evident by non-blanchable erythema

## 2022-11-07 NOTE — PROGRESS NOTE ADULT - ASSESSMENT
Pt is an 89 yo M w PMH unspecified cardiac arrhythmia on eliquis, HTN, dementia, p/w to hospital with AMS and acute hematuria x 1 day. Vogel Catheter placed in ED with 2 L of hematuria with minimal clots noted on. pt admitted to medicine for acute hematuria. Ct abd notable for moderate left and mild right hydronephrosis with hydroureter. continues with CBI, has intermittent clots noted. urology following.  Pt is an 88 yo M w PMH unspecified cardiac arrhythmia on eliquis, HTN, dementia, p/w to hospital with AMS and acute hematuria x 1 day. Vogel Catheter placed in ED with 2 L of hematuria with minimal clots noted on. pt admitted to medicine for acute hematuria. Ct abd notable for moderate left and mild right hydronephrosis with hydroureter. continues with CBI, has intermittent clots noted. urology following.   11/7-CBI continues, drainage almost clear however when CBI rate slows down drainage gets more bloody.

## 2022-11-07 NOTE — PROGRESS NOTE ADULT - PROBLEM SELECTOR PLAN 5
not on medications at home  -continues to try to pull valencia and PIV out  -delirium and dementia precautions  -unrestrained mittens  -olanzapine prn pt takes metoprolol succ 100 mg + 25 mg daily, as well as losartan daily  cont home meds w parameters

## 2022-11-07 NOTE — PROGRESS NOTE ADULT - SUBJECTIVE AND OBJECTIVE BOX
`Problem List:  TERENCE , obstructive uropathy    PAST MEDICAL & SURGICAL HISTORY:  Hematuria      HTN (hypertension)      Arrhythmia      Visual impairment      Dementia      TERENCE (acute kidney injury)          No Known Allergies      MEDICATIONS  (STANDING):  influenza  Vaccine (HIGH DOSE) 0.7 milliLiter(s) IntraMuscular once  lactated ringers. 1000 milliLiter(s) (85 mL/Hr) IV Continuous <Continuous>  metoprolol tartrate 50 milliGRAM(s) Oral two times a day  metoprolol tartrate 12.5 milliGRAM(s) Oral two times a day  multivitamin/minerals 1 Tablet(s) Oral daily  oxybutynin 5 milliGRAM(s) Oral three times a day    MEDICATIONS  (PRN):  acetaminophen  Suppository .. 650 milliGRAM(s) Rectal every 6 hours PRN Temp greater or equal to 38C (100.4F)  OLANZapine 2.5 milliGRAM(s) Oral every 8 hours PRN agitation                            10.5   9.39  )-----------( 233      ( 07 Nov 2022 05:50 )             32.0     11-07    136  |  102  |  28<H>  ----------------------------<  100<H>  3.5   |  26  |  1.39<H>    Ca    8.8      07 Nov 2022 05:50              REVIEW OF SYSTEMS:  dementia.      VITALS:  T(F): 97.8 (11-07-22 @ 13:45), Max: 99.2 (11-06-22 @ 20:50)  HR: 71 (11-07-22 @ 13:45)  BP: 126/80 (11-07-22 @ 13:45)  RR: 18 (11-07-22 @ 13:45)  SpO2: 92% (11-07-22 @ 13:45)  Wt(kg): --    11-06 @ 07:01  -  11-07 @ 07:00  --------------------------------------------------------  IN: 53984 mL / OUT: 77102 mL / NET: -6235 mL    11-07 @ 07:01 - 11-07 @ 15:19  --------------------------------------------------------  IN: 2750 mL / OUT: 7000 mL / NET: -4250 mL        PHYSICAL EXAM:  Constitutional: well developed, no diaphoresis, no distress.  Neck: No JVD, no carotid bruit, supple, no adenopathy  Respiratory:  air entrance B/L, no wheezes, rales or rhonchi  Cardiovascular: S1, S2, RRR, no pericardial rub, no murmur  Abdomen: BS+, soft, no tenderness, no bruit  Pelvis: bladder nondistended  Extremities: No cyanosis or clubbing. No peripheral edema.

## 2022-11-07 NOTE — PROGRESS NOTE ADULT - PROBLEM SELECTOR PLAN 7
-pt lives at home with wife  -spoke with wife regarding plan of care, questions/concerns addressed  -wife states shes primary caregiver of pt, however may not be able to continue to do so, thinking about possible LTC placement.   -PT eval pending not on medications at home  -continues to try to pull valencia and PIV out  -delirium and dementia precautions  -unrestrained mittens  -olanzapine prn

## 2022-11-07 NOTE — PHYSICAL THERAPY INITIAL EVALUATION ADULT - DIAGNOSIS, PT EVAL
Pt presents w/ weakness and pain which have reduced his ability to perform bed mobility, transfers, and ambulation.

## 2022-11-07 NOTE — PROGRESS NOTE ADULT - ASSESSMENT
89yoM with h/o HTN, dementia, on Eliquis 5mg BID  Hematuria resolving   H/H stable     - Monitor BMP for post obstructive diuresis  - Monitor urine color, c/w CBI at slow gtt, wean as tolerated (consider clamp trial this PM)  - Monitor H&H  - Continue holding Eliquis  - F/u urine culture  - Bilateral hydronephrosis and TERENCE possibly due to obstructive uropathy in setting of urinary retention   - Repeat renal u/s in 3-5 days to see if hydronephrosis improves  - Patient will need outpatient cystoscopy and CT urogram for gross hematuria workup  - Continue with Ditropan and B&O suppositories for bladder spasms

## 2022-11-07 NOTE — PHYSICAL THERAPY INITIAL EVALUATION ADULT - ADDITIONAL COMMENTS
Pt lives with his wife and has stairs at home. Prior to admission pt was able to ambulate down stairs but struggled ascending. Upon EMS arrival pt could not ambulate down stairs. Pt utilized a walker around the house prior for short distances, but spent the majority of his day sitting in a chair.

## 2022-11-07 NOTE — PROGRESS NOTE ADULT - SUBJECTIVE AND OBJECTIVE BOX
NP Note discussed with  Primary Attending    Patient is a 89y old  Male who presents with a chief complaint of uti/hematuria (06 Nov 2022 12:44)      INTERVAL HPI/OVERNIGHT EVENTS: no new complaints    MEDICATIONS  (STANDING):  influenza  Vaccine (HIGH DOSE) 0.7 milliLiter(s) IntraMuscular once  lactated ringers. 1000 milliLiter(s) (85 mL/Hr) IV Continuous <Continuous>  metoprolol tartrate 50 milliGRAM(s) Oral two times a day  metoprolol tartrate 12.5 milliGRAM(s) Oral two times a day  multivitamin/minerals 1 Tablet(s) Oral daily  oxybutynin 5 milliGRAM(s) Oral three times a day    MEDICATIONS  (PRN):  acetaminophen  Suppository .. 650 milliGRAM(s) Rectal every 6 hours PRN Temp greater or equal to 38C (100.4F)  OLANZapine 2.5 milliGRAM(s) Oral every 8 hours PRN agitation      __________________________________________________  REVIEW OF SYSTEMS:    CONSTITUTIONAL: No fever,   EYES: no acute visual disturbances  NECK: No pain or stiffness  RESPIRATORY: No cough; No shortness of breath  CARDIOVASCULAR: No chest pain, no palpitations  GASTROINTESTINAL: No pain. No nausea or vomiting; No diarrhea   NEUROLOGICAL: No headache or numbness, no tremors  MUSCULOSKELETAL: No joint pain, no muscle pain  GENITOURINARY: no dysuria, no frequency, no hesitancy  PSYCHIATRY: no depression , no anxiety  ALL OTHER  ROS negative        Vital Signs Last 24 Hrs  T(C): 36.5 (07 Nov 2022 04:53), Max: 37.3 (06 Nov 2022 20:50)  T(F): 97.7 (07 Nov 2022 04:53), Max: 99.2 (06 Nov 2022 20:50)  HR: 88 (07 Nov 2022 04:53) (61 - 90)  BP: 137/84 (07 Nov 2022 04:53) (117/72 - 137/84)  BP(mean): --  RR: 18 (07 Nov 2022 04:53) (18 - 18)  SpO2: 100% (07 Nov 2022 04:53) (98% - 100%)    Parameters below as of 07 Nov 2022 04:53  Patient On (Oxygen Delivery Method): room air        ________________________________________________  PHYSICAL EXAM:  GENERAL: NAD  HEENT: Normocephalic;  conjunctivae and sclerae clear; moist mucous membranes;   NECK : supple  CHEST/LUNG: Clear to auscultation bilaterally with good air entry   HEART: S1 S2  regular; no murmurs, gallops or rubs  ABDOMEN: Soft, Nontender, Nondistended; Bowel sounds present  EXTREMITIES: no cyanosis; no edema; no calf tenderness  SKIN: warm and dry; no rash  NERVOUS SYSTEM:  Awake and alert; Oriented  to place, person and time ; no new deficits    _________________________________________________  LABS:                        10.5   9.39  )-----------( 233      ( 07 Nov 2022 05:50 )             32.0     11-07    136  |  102  |  28<H>  ----------------------------<  100<H>  3.5   |  26  |  1.39<H>    Ca    8.8      07 Nov 2022 05:50          CAPILLARY BLOOD GLUCOSE    RADIOLOGY & ADDITIONAL TESTS:    < from: CT Head No Cont (11.03.22 @ 14:30) >    ACC: [id number] EXAM:  CT BRAIN                          PROCEDURE DATE:  11/03/2022          INTERPRETATION:  Clinical indication: Confusion.    Multiple axial sections were performed from base of vertex without   contrast enhancement. Coronal and sagittal reconstructions were performed.    Parenchymal volume loss and chronic microvascular ischemic changes are   identified.    There is no acute hemorrhage mass or mass effect seen.    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    Evaluation of the osseous structures with the appropriate window appears   normal    Opacification of the right maxillary sinus is seen which is compatible   with mucosal thickening.    Both mastoid and middle ear regions appear clear.    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    < end of copied text >    < from: CT Abdomen and Pelvis No Cont (11.03.22 @ 14:30) >    ACC: [id number] EXAM:  CT ABDOMEN AND PELVIS                          PROCEDURE DATE:  11/03/2022          INTERPRETATION:  CLINICAL INFORMATION: 89-year-old male patient with   hematuria    COMPARISON: None.    CONTRAST/COMPLICATIONS:  IV Contrast: NONE  Oral Contrast: NONE  Complications: None reported at time of study completion    PROCEDURE:  CT of the Abdomen and Pelvis was performed.  Sagittal and coronal reformats were performed.    FINDINGS:  LOWER CHEST: Small right-sided pleural effusion and compression   atelectasis. Cardiomegaly. Coronary artery disease.    LIVER: Dilated hepatic veins. Slightly lobular contour of the liver, may   represent fibrosis.  BILE DUCTS: Normal caliber.  GALLBLADDER: Cholelithiasis.  SPLEEN: Within normallimits.  PANCREAS: Mild pancreatic parenchymal atrophy. No ductal dilatation.  ADRENALS: No nodules or masses.  KIDNEYS/URETERS: Bilateral hydronephrosis, moderate left and mild on the   right. Ureters are not abnormally dilated in entire length.    BLADDER: Bladder is decompressed with a Vogel catheter in place.  REPRODUCTIVE ORGANS: Prostate within normal limits.    BOWEL: No bowel obstruction. Appendix is normal. Colonic diverticulosis   without diverticulitis.  PERITONEUM: No ascites.  VESSELS: Abdominal aorta measures 2.9 cm demonstrates moderate   atherosclerotic changes.  RETROPERITONEUM/LYMPH NODES: No lymphadenopathy.  ABDOMINAL WALL: Within normal limits.  BONES: Degenerative changes of bilateral hip joints. Multilevel   discogenic degenerative disease in the lumbar spine, predominantly L2-3   and L5-S1.    IMPRESSION:  Moderate left and mild right-sided hydronephrosis without hydroureter.   Decompressed urinary bladder with a Vogel catheter in place. No   obstructive ureterolithiasis.    Cardiomegaly and right pleural effusion.    < end of copied text >      Imaging Personally Reviewed:  YES/NO    Consultant(s) Notes Reviewed:   YES/ No    Care Discussed with Consultants :     Plan of care was discussed with patient and /or primary care giver; all questions and concerns were addressed and care was aligned with patient's wishes.     NP Note discussed with  Primary Attending    Patient is a 89y old  Male who presents with a chief complaint of uti/hematuria (06 Nov 2022 12:44)      INTERVAL HPI/OVERNIGHT EVENTS: no new complaints    MEDICATIONS  (STANDING):  influenza  Vaccine (HIGH DOSE) 0.7 milliLiter(s) IntraMuscular once  lactated ringers. 1000 milliLiter(s) (85 mL/Hr) IV Continuous <Continuous>  metoprolol tartrate 50 milliGRAM(s) Oral two times a day  metoprolol tartrate 12.5 milliGRAM(s) Oral two times a day  multivitamin/minerals 1 Tablet(s) Oral daily  oxybutynin 5 milliGRAM(s) Oral three times a day    MEDICATIONS  (PRN):  acetaminophen  Suppository .. 650 milliGRAM(s) Rectal every 6 hours PRN Temp greater or equal to 38C (100.4F)  OLANZapine 2.5 milliGRAM(s) Oral every 8 hours PRN agitation      __________________________________________________  REVIEW OF SYSTEMS:  Unable to obtain due to poor mentation        Vital Signs Last 24 Hrs  T(C): 36.5 (07 Nov 2022 04:53), Max: 37.3 (06 Nov 2022 20:50)  T(F): 97.7 (07 Nov 2022 04:53), Max: 99.2 (06 Nov 2022 20:50)  HR: 88 (07 Nov 2022 04:53) (61 - 90)  BP: 137/84 (07 Nov 2022 04:53) (117/72 - 137/84)  BP(mean): --  RR: 18 (07 Nov 2022 04:53) (18 - 18)  SpO2: 100% (07 Nov 2022 04:53) (98% - 100%)    Parameters below as of 07 Nov 2022 04:53  Patient On (Oxygen Delivery Method): room air        ________________________________________________  PHYSICAL EXAM:  well developed, confused  GENERAL: NAD  HEENT: Normocephalic;  conjunctivae and sclerae clear; moist mucous membranes;   NECK : supple  CHEST/LUNG: Clear to auscultation bilaterally with good air entry   HEART: S1 S2  regular; no murmurs, gallops or rubs  ABDOMEN: Soft, Nontender, Nondistended; Bowel sounds present  EXTREMITIES: no cyanosis; no edema; no calf tenderness  SKIN: warm and dry; no rash  NERVOUS SYSTEM:  Awake and alert; confused  _________________________________________________  LABS:                        10.5   9.39  )-----------( 233      ( 07 Nov 2022 05:50 )             32.0     11-07    136  |  102  |  28<H>  ----------------------------<  100<H>  3.5   |  26  |  1.39<H>    Ca    8.8      07 Nov 2022 05:50          CAPILLARY BLOOD GLUCOSE    RADIOLOGY & ADDITIONAL TESTS:    < from: CT Head No Cont (11.03.22 @ 14:30) >    ACC: [id number] EXAM:  CT BRAIN                          PROCEDURE DATE:  11/03/2022          INTERPRETATION:  Clinical indication: Confusion.    Multiple axial sections were performed from base of vertex without   contrast enhancement. Coronal and sagittal reconstructions were performed.    Parenchymal volume loss and chronic microvascular ischemic changes are   identified.    There is no acute hemorrhage mass or mass effect seen.    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    Evaluation of the osseous structures with the appropriate window appears   normal    Opacification of the right maxillary sinus is seen which is compatible   with mucosal thickening.    Both mastoid and middle ear regions appear clear.    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    < end of copied text >    < from: CT Abdomen and Pelvis No Cont (11.03.22 @ 14:30) >    ACC: [id number] EXAM:  CT ABDOMEN AND PELVIS                          PROCEDURE DATE:  11/03/2022          INTERPRETATION:  CLINICAL INFORMATION: 89-year-old male patient with   hematuria    COMPARISON: None.    CONTRAST/COMPLICATIONS:  IV Contrast: NONE  Oral Contrast: NONE  Complications: None reported at time of study completion    PROCEDURE:  CT of the Abdomen and Pelvis was performed.  Sagittal and coronal reformats were performed.    FINDINGS:  LOWER CHEST: Small right-sided pleural effusion and compression   atelectasis. Cardiomegaly. Coronary artery disease.    LIVER: Dilated hepatic veins. Slightly lobular contour of the liver, may   represent fibrosis.  BILE DUCTS: Normal caliber.  GALLBLADDER: Cholelithiasis.  SPLEEN: Within normallimits.  PANCREAS: Mild pancreatic parenchymal atrophy. No ductal dilatation.  ADRENALS: No nodules or masses.  KIDNEYS/URETERS: Bilateral hydronephrosis, moderate left and mild on the   right. Ureters are not abnormally dilated in entire length.    BLADDER: Bladder is decompressed with a Vogel catheter in place.  REPRODUCTIVE ORGANS: Prostate within normal limits.    BOWEL: No bowel obstruction. Appendix is normal. Colonic diverticulosis   without diverticulitis.  PERITONEUM: No ascites.  VESSELS: Abdominal aorta measures 2.9 cm demonstrates moderate   atherosclerotic changes.  RETROPERITONEUM/LYMPH NODES: No lymphadenopathy.  ABDOMINAL WALL: Within normal limits.  BONES: Degenerative changes of bilateral hip joints. Multilevel   discogenic degenerative disease in the lumbar spine, predominantly L2-3   and L5-S1.    IMPRESSION:  Moderate left and mild right-sided hydronephrosis without hydroureter.   Decompressed urinary bladder with a Vogel catheter in place. No   obstructive ureterolithiasis.    Cardiomegaly and right pleural effusion.    < end of copied text >      Imaging Personally Reviewed:  YES/NO    Consultant(s) Notes Reviewed:   YES/ No    Care Discussed with Consultants :     Plan of care was discussed with patient and /or primary care giver; all questions and concerns were addressed and care was aligned with patient's wishes.

## 2022-11-07 NOTE — PROGRESS NOTE ADULT - PROBLEM SELECTOR PLAN 2
SCr 2.15 (unk baseline)  Ct demonstrated Bilateral hydronephrosis  Suggestive of obstructive uropathy in setting of urinary retention  Trend SCr, repeat BMP  -Dr Bailey nephro, appreciate recs  -gentle hydration, 80 cc/hr LR p/w gross hematuria associated with urinary retention and TERENCE  -Valencia placed in ED with 2liters U/O  -TERENCE improved post valencia placement  -Nephro Dr. Bond following

## 2022-11-07 NOTE — PROGRESS NOTE ADULT - PROBLEM SELECTOR PLAN 3
pt takes metoprolol succ 100 mg + 25 mg daily, as well as losartan daily  cont home meds w parameters likely due to obstructive uropathy  -SCr 2.15 on admission  -Ct demonstrated Bilateral hydronephrosis  -Improved SCr post valencia placement and IVF hydration  -SCr 1.39 today (11/7)  -Avoid nephrotoxic meds

## 2022-11-07 NOTE — PHYSICAL THERAPY INITIAL EVALUATION ADULT - PERTINENT HX OF CURRENT PROBLEM, REHAB EVAL
Pt admitted for hematuria and AMS. 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

## 2022-11-07 NOTE — PHYSICAL THERAPY INITIAL EVALUATION ADULT - LEVEL OF INDEPENDENCE: GAIT, REHAB EVAL
due to discomfort in Flash knees with WB activities, RN made aware. Ambulation TBA ,/unable to perform

## 2022-11-07 NOTE — PHYSICAL THERAPY INITIAL EVALUATION ADULT - ADL SKILLS, REHAB EVAL
needs device and assist no HHA services prior to admission, family assisted as needed/needs device and assist

## 2022-11-08 DIAGNOSIS — K59.00 CONSTIPATION, UNSPECIFIED: ICD-10-CM

## 2022-11-08 LAB
ANION GAP SERPL CALC-SCNC: 7 MMOL/L — SIGNIFICANT CHANGE UP (ref 5–17)
BUN SERPL-MCNC: 25 MG/DL — HIGH (ref 7–18)
CALCIUM SERPL-MCNC: 8.7 MG/DL — SIGNIFICANT CHANGE UP (ref 8.4–10.5)
CHLORIDE SERPL-SCNC: 104 MMOL/L — SIGNIFICANT CHANGE UP (ref 96–108)
CO2 SERPL-SCNC: 25 MMOL/L — SIGNIFICANT CHANGE UP (ref 22–31)
CREAT SERPL-MCNC: 1.23 MG/DL — SIGNIFICANT CHANGE UP (ref 0.5–1.3)
EGFR: 56 ML/MIN/1.73M2 — LOW
GLUCOSE SERPL-MCNC: 96 MG/DL — SIGNIFICANT CHANGE UP (ref 70–99)
HCT VFR BLD CALC: 29.6 % — LOW (ref 39–50)
HGB BLD-MCNC: 9.8 G/DL — LOW (ref 13–17)
MCHC RBC-ENTMCNC: 29.7 PG — SIGNIFICANT CHANGE UP (ref 27–34)
MCHC RBC-ENTMCNC: 33.1 GM/DL — SIGNIFICANT CHANGE UP (ref 32–36)
MCV RBC AUTO: 89.7 FL — SIGNIFICANT CHANGE UP (ref 80–100)
MRSA PCR RESULT.: SIGNIFICANT CHANGE UP
NRBC # BLD: 0 /100 WBCS — SIGNIFICANT CHANGE UP (ref 0–0)
PLATELET # BLD AUTO: 237 K/UL — SIGNIFICANT CHANGE UP (ref 150–400)
POTASSIUM SERPL-MCNC: 3.7 MMOL/L — SIGNIFICANT CHANGE UP (ref 3.5–5.3)
POTASSIUM SERPL-SCNC: 3.7 MMOL/L — SIGNIFICANT CHANGE UP (ref 3.5–5.3)
RBC # BLD: 3.3 M/UL — LOW (ref 4.2–5.8)
RBC # FLD: 13.7 % — SIGNIFICANT CHANGE UP (ref 10.3–14.5)
S AUREUS DNA NOSE QL NAA+PROBE: SIGNIFICANT CHANGE UP
SODIUM SERPL-SCNC: 136 MMOL/L — SIGNIFICANT CHANGE UP (ref 135–145)
WBC # BLD: 8.5 K/UL — SIGNIFICANT CHANGE UP (ref 3.8–10.5)
WBC # FLD AUTO: 8.5 K/UL — SIGNIFICANT CHANGE UP (ref 3.8–10.5)

## 2022-11-08 RX ORDER — SENNA PLUS 8.6 MG/1
2 TABLET ORAL AT BEDTIME
Refills: 0 | Status: DISCONTINUED | OUTPATIENT
Start: 2022-11-08 | End: 2022-11-15

## 2022-11-08 RX ORDER — POLYETHYLENE GLYCOL 3350 17 G/17G
17 POWDER, FOR SOLUTION ORAL DAILY
Refills: 0 | Status: DISCONTINUED | OUTPATIENT
Start: 2022-11-08 | End: 2022-11-15

## 2022-11-08 RX ADMIN — POLYETHYLENE GLYCOL 3350 17 GRAM(S): 17 POWDER, FOR SOLUTION ORAL at 17:41

## 2022-11-08 RX ADMIN — Medication 12.5 MILLIGRAM(S): at 17:41

## 2022-11-08 RX ADMIN — SODIUM CHLORIDE 85 MILLILITER(S): 9 INJECTION, SOLUTION INTRAVENOUS at 22:12

## 2022-11-08 RX ADMIN — Medication 12.5 MILLIGRAM(S): at 05:35

## 2022-11-08 RX ADMIN — Medication 5 MILLIGRAM(S): at 05:35

## 2022-11-08 RX ADMIN — Medication 5 MILLIGRAM(S): at 22:08

## 2022-11-08 RX ADMIN — SENNA PLUS 2 TABLET(S): 8.6 TABLET ORAL at 22:07

## 2022-11-08 NOTE — PROGRESS NOTE ADULT - PROBLEM SELECTOR PLAN 3
likely due to obstructive uropathy  -SCr 2.15 on admission  -Ct demonstrated Bilateral hydronephrosis  -Improved SCr post valencia placement and IVF hydration  -SCr 1.23 today (11/8)  -Avoid nephrotoxic meds

## 2022-11-08 NOTE — PROGRESS NOTE ADULT - ASSESSMENT
89yoM with h/o HTN, dementia, on Eliquis 5mg BID, presents with wife with hematuria. TERENCE, Hgb stable, Hematuria improved after irrigation. However was obstructed on arrival in ED with significant urine output after valencia placement. Admitted to medicine. U/A with blood but only trace LE and negative bacteria. Urine color this morning was clear light pink on slow drip  - Recommend monitoring BMP for post obstructive diuresis  - CBI clamped at bedside, please call if patient develops lower abdominal pain or gross hematuria/clots  - Monitor H&H - stable  - Continue holding eliquis  - F/u urine culture  - Bilateral hydronephrosis and TERENCE possibly due to obstructive uropathy in setting of urinary retention   - Recommend repeat renal u/s to see if hydronephrosis improves  - Patient will need outpatient cystoscopy and CT urogram for gross hematuria workup  - Continue with CTX  - Continue with ditropan and B&O suppositories for bladder spasms

## 2022-11-08 NOTE — PROGRESS NOTE ADULT - PROBLEM SELECTOR PLAN 4
Improving  -Urology following  -CBI clamped 8am  -So far no gross hematuria,  -H/H stable  -Cont holding AC  -Cont oxybutynin 5 mg TID for bladder spasm  -OP cystoscopy  -Pt would benefit from CT urogram, however elevated SCr is contraindication for contrast

## 2022-11-08 NOTE — PROGRESS NOTE ADULT - SUBJECTIVE AND OBJECTIVE BOX
INTERVAL HPI/OVERNIGHT EVENTS:  Patient seen,awake,no acute issues  VITAL SIGNS:  T(F): 98.3 (11-08-22 @ 05:08)  HR: 78 (11-08-22 @ 05:08)  BP: 118/77 (11-08-22 @ 05:08)  RR: 18 (11-08-22 @ 05:08)  SpO2: 95% (11-08-22 @ 05:08)  Wt(kg): --    PHYSICAL EXAM:  awake  Constitutional:  Eyes:  ENMT:perrla  Neck:  Respiratory:clear  Cardiovascular:s1s2,m-none  Gastrointestinal:soft,bs pos  Extremities:  Vascular:  Neurological:no focal deficit,valencia in place  Musculoskeletal:    MEDICATIONS  (STANDING):  influenza  Vaccine (HIGH DOSE) 0.7 milliLiter(s) IntraMuscular once  lactated ringers. 1000 milliLiter(s) (85 mL/Hr) IV Continuous <Continuous>  metoprolol tartrate 12.5 milliGRAM(s) Oral two times a day  multivitamin/minerals 1 Tablet(s) Oral daily  oxybutynin 5 milliGRAM(s) Oral three times a day    MEDICATIONS  (PRN):  acetaminophen     Tablet .. 650 milliGRAM(s) Oral every 6 hours PRN Moderate Pain (4 - 6)  acetaminophen  Suppository .. 650 milliGRAM(s) Rectal every 6 hours PRN Temp greater or equal to 38C (100.4F)  OLANZapine 2.5 milliGRAM(s) Oral every 8 hours PRN agitation      Allergies    No Known Allergies    Intolerances        LABS:                        9.8    8.50  )-----------( 237      ( 08 Nov 2022 06:05 )             29.6     11-08    136  |  104  |  25<H>  ----------------------------<  96  3.7   |  25  |  1.23    Ca    8.7      08 Nov 2022 06:05            RADIOLOGY & ADDITIONAL TESTS:      Assessment and Plan:   · Assessment	  Pt is an 88 yo M w PMH unspecified cardiac arrhythmia on eliquis, HTN, dementia, p/w to hospital with AMS and acute hematuria x 1 day. Valencia Catheter placed in ED with 2 L of hematuria with minimal clots noted on. pt admitted to medicine for acute hematuria. Ct abd notable for moderate left and mild right hydronephrosis with hydroureter. continues with CBI, has intermittent clots noted. urology following.   11/7-CBI continues, drainage almost clear however when CBI rate slows down drainage gets more bloody.     Problem/Plan - 1:  ·  Problem: Bilateral hydronephrosis.   ·  Plan: p/w gross hematuria  -Urology following, note and reccs appreciated  -f/u renal US to see if hydronephrosis improved  -OP cystoscopy and CT urogram.     Problem/Plan - 2:  ·  Problem: Obstructive uropathy.   ·  Plan: p/w gross hematuria associated with urinary retention and TERENCE  -Valencia placed in ED with 2liters U/O  -TERENCE improved post valencia placement  -Nephro Dr. Bond following.     Problem/Plan - 3:  ·  Problem: TERENCE (acute kidney injury).   ·  Plan: likely due to obstructive uropathy  -SCr 2.15 on admission  -Ct demonstrated Bilateral hydronephrosis  -Improved SCr post valencia placement and IVF hydration  -SCr 1.39 today (11/7)  -Avoid nephrotoxic meds.     Problem/Plan - 4:  ·  Problem: Hematuria.   ·  Plan: Improving  -Urology following  -CBI continues for now  -Not tolerating decreased CBI rate ate this time  -H/H stable  -Cont holding AC  -Cont oxybutynin 5 mg TID for bladder spasm  -OP cystoscopy  -Pt would benefit from CT urogram, however elevated SCr is contraindication for contrast.     Problem/Plan - 5:  ·  Problem: HTN (hypertension).   ·  Plan: pt takes metoprolol succ 100 mg + 25 mg daily, as well as losartan daily  cont home meds w parameters.     Problem/Plan - 6:  ·  Problem: Arrhythmia.   ·  Plan: pt takes eliquis for unknown cardiac arrhythmia  hold for hematuria.     Problem/Plan - 7:  ·  Problem: Dementia.   ·  Plan: not on medications at home  -continues to try to pull valencia and PIV out  -delirium and dementia precautions  -unrestrained mittens  -olanzapine prn.     Problem/Plan - 8:  ·  Problem: Prophylactic measure.   ·  Plan: -SCD.     Problem/Plan - 9:  ·  Problem: Discharge planning issues.   ·  Plan: -pt lives at home with wife  -Pt.'s wife and daughter updated re pt.'s condition and plan of care  -PT recc KODY  -Potentially LTC placement.

## 2022-11-08 NOTE — PROGRESS NOTE ADULT - PROBLEM SELECTOR PLAN 1
Detail Level: Zone Detail Level: Simple Detail Level: Detailed p/w gross hematuria  -CT A/P shows Moderate left and mild right-sided hydronephrosis without hydroureter.    No obstructive ureterolithiasis.  -Urology following, note and reccs appreciated  -f/u renal US to see if hydronephrosis improved  -OP cystoscopy and CT urogram

## 2022-11-08 NOTE — PROGRESS NOTE ADULT - SUBJECTIVE AND OBJECTIVE BOX
NP Note discussed with  Primary Attending    Patient is a 89y old  Male who presents with a chief complaint of hematuria (08 Nov 2022 12:18)      INTERVAL HPI/OVERNIGHT EVENTS: no new complaints    MEDICATIONS  (STANDING):  influenza  Vaccine (HIGH DOSE) 0.7 milliLiter(s) IntraMuscular once  lactated ringers. 1000 milliLiter(s) (85 mL/Hr) IV Continuous <Continuous>  metoprolol tartrate 12.5 milliGRAM(s) Oral two times a day  multivitamin/minerals 1 Tablet(s) Oral daily  oxybutynin 5 milliGRAM(s) Oral three times a day    MEDICATIONS  (PRN):  acetaminophen     Tablet .. 650 milliGRAM(s) Oral every 6 hours PRN Moderate Pain (4 - 6)  acetaminophen  Suppository .. 650 milliGRAM(s) Rectal every 6 hours PRN Temp greater or equal to 38C (100.4F)  OLANZapine 2.5 milliGRAM(s) Oral every 8 hours PRN agitation      __________________________________________________  REVIEW OF SYSTEMS:    CONSTITUTIONAL: No fever,   EYES: no acute visual disturbances  NECK: No pain or stiffness  RESPIRATORY: No cough; No shortness of breath  CARDIOVASCULAR: No chest pain, no palpitations  GASTROINTESTINAL: No pain. No nausea or vomiting; No diarrhea   NEUROLOGICAL: No headache or numbness, no tremors  MUSCULOSKELETAL: No joint pain, no muscle pain  GENITOURINARY: no dysuria, no frequency, no hesitancy  PSYCHIATRY: no depression , no anxiety  ALL OTHER  ROS negative        Vital Signs Last 24 Hrs  T(C): 36.6 (08 Nov 2022 14:30), Max: 36.8 (08 Nov 2022 05:08)  T(F): 97.9 (08 Nov 2022 14:30), Max: 98.3 (08 Nov 2022 05:08)  HR: 77 (08 Nov 2022 14:30) (66 - 78)  BP: 118/76 (08 Nov 2022 14:30) (104/65 - 118/77)  BP(mean): --  RR: 17 (08 Nov 2022 14:30) (17 - 18)  SpO2: 98% (08 Nov 2022 14:30) (95% - 98%)    Parameters below as of 08 Nov 2022 14:30  Patient On (Oxygen Delivery Method): room air        ________________________________________________  PHYSICAL EXAM:  well developed, on and off confused  GENERAL: NAD  HEENT: Normocephalic;  conjunctivae and sclerae clear; moist mucous membranes;   NECK : supple  CHEST/LUNG: Clear to auscultation bilaterally with good air entry   HEART: S1 S2  regular; no murmurs, gallops or rubs  ABDOMEN: Soft, Nontender, Nondistended; Bowel sounds present  EXTREMITIES: no cyanosis; no edema; no calf tenderness  SKIN: warm and dry; no rash  NERVOUS SYSTEM:  Awake and alert; confused  _________________________________________________  LABS:                        9.8    8.50  )-----------( 237      ( 08 Nov 2022 06:05 )             29.6     11-08    136  |  104  |  25<H>  ----------------------------<  96  3.7   |  25  |  1.23    Ca    8.7      08 Nov 2022 06:05    CAPILLARY BLOOD GLUCOSE    RADIOLOGY & ADDITIONAL TESTS:    < from: CT Head No Cont (11.03.22 @ 14:30) >    ACC: [id number] EXAM:  CT BRAIN                          PROCEDURE DATE:  11/03/2022          INTERPRETATION:  Clinical indication: Confusion.    Multiple axial sections were performed from base of vertex without   contrast enhancement. Coronal and sagittal reconstructions were performed.    Parenchymal volume loss and chronic microvascular ischemic changes are   identified.    There is no acute hemorrhage mass or mass effect seen.    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    Evaluation of the osseous structures with the appropriate window appears   normal    Opacification of the right maxillary sinus is seen which is compatible   with mucosal thickening.    Both mastoid and middle ear regions appear clear.    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    --- End of Report ---    < end of copied text >    < from: CT Abdomen and Pelvis No Cont (11.03.22 @ 14:30) >    ACC: [id number] EXAM:  CT ABDOMEN AND PELVIS                          PROCEDURE DATE:  11/03/2022          INTERPRETATION:  CLINICAL INFORMATION: 89-year-old male patient with   hematuria    COMPARISON: None.    CONTRAST/COMPLICATIONS:  IV Contrast: NONE  Oral Contrast: NONE  Complications: None reported at time of study completion    PROCEDURE:  CT of the Abdomen and Pelvis was performed.  Sagittal and coronal reformats were performed.    FINDINGS:  LOWER CHEST: Small right-sided pleural effusion and compression   atelectasis. Cardiomegaly. Coronary artery disease.    LIVER: Dilated hepatic veins. Slightly lobular contour of the liver, may   represent fibrosis.  BILE DUCTS: Normal caliber.  GALLBLADDER: Cholelithiasis.  SPLEEN: Within normallimits.  PANCREAS: Mild pancreatic parenchymal atrophy. No ductal dilatation.  ADRENALS: No nodules or masses.  KIDNEYS/URETERS: Bilateral hydronephrosis, moderate left and mild on the   right. Ureters are not abnormally dilated in entire length.    BLADDER: Bladder is decompressed with a Vogel catheter in place.  REPRODUCTIVE ORGANS: Prostate within normal limits.    BOWEL: No bowel obstruction. Appendix is normal. Colonic diverticulosis   without diverticulitis.  PERITONEUM: No ascites.  VESSELS: Abdominal aorta measures 2.9 cm demonstrates moderate   atherosclerotic changes.  RETROPERITONEUM/LYMPH NODES: No lymphadenopathy.  ABDOMINAL WALL: Within normal limits.  BONES: Degenerative changes of bilateral hip joints. Multilevel   discogenic degenerative disease in the lumbar spine, predominantly L2-3   and L5-S1.    IMPRESSION:  Moderate left and mild right-sided hydronephrosis without hydroureter.   Decompressed urinary bladder with a Vogel catheter in place. No   obstructive ureterolithiasis.    Cardiomegaly and right pleural effusion.    < end of copied text >      Imaging Personally Reviewed:  YES/NO    Consultant(s) Notes Reviewed:   YES/ No    Care Discussed with Consultants :     Plan of care was discussed with patient and /or primary care giver; all questions and concerns were addressed and care was aligned with patient's wishes.

## 2022-11-08 NOTE — PROGRESS NOTE ADULT - PROBLEM SELECTOR PLAN 2
p/w gross hematuria associated with urinary retention and TERENCE  -Valencia placed in ED with 2liters U/O  -TERENCE improved post valencia placement  -Nephro Dr. Bond following  -CBI clamped 8am (11/8) by urology  -Valencia draining adequately at this time

## 2022-11-08 NOTE — PROGRESS NOTE ADULT - ASSESSMENT
Pt is an 88 yo M w PMH A Fib/Flutter on Eliquis, HTN, dementia, p/w to hospital with AMS and acute hematuria x 1 day. Vogel Catheter placed in ED with 2 L of hematuria with minimal clots noted on. pt admitted to medicine for acute hematuria. Ct abd notable for moderate left and mild right hydronephrosis with hydroureter. continues with CBI, has intermittent clots noted. urology following.   11/7-CBI continues, drainage almost clear however when CBI rate slows down drainage gets more bloody.  11/8-CBI clamped by uro at 8am, remains clamped at this time.  Vogel draining mod amount dark urine, no gross hematuria or clots noted, no c/o abdominal pain at this time.  PT recc KODY, CM following.

## 2022-11-08 NOTE — PROGRESS NOTE ADULT - SUBJECTIVE AND OBJECTIVE BOX
Patient seen and examined at bedside resting comfortably    Vital Signs Last 24 Hrs  T(F): 98.3 (11-08-22 @ 05:08), Max: 98.3 (11-08-22 @ 05:08)  HR: 78 (11-08-22 @ 05:08)  BP: 118/77 (11-08-22 @ 05:08)  RR: 18 (11-08-22 @ 05:08)  SpO2: 95% (11-08-22 @ 05:08)    GENERAL: Alert, NAD  CHEST/LUNG: respirations nonlabored  ABDOMEN: soft, Nontender, Nondistended  : CBI running at slow drip with clear urine output, mild blood staining in tubing noted. CBI clamped at bedside and RN informed.     I&O's Detail    07 Nov 2022 07:01  -  08 Nov 2022 07:00  --------------------------------------------------------  IN:    Continuous Bladder Irrigation (mL): 47393 mL  Total IN: 64610 mL    OUT:    Continuous Bladder Irrigation (mL): 05016 mL  Total OUT: 33057 mL    Total NET: -3700 mL    LABS:                        9.8    8.50  )-----------( 237      ( 08 Nov 2022 06:05 )             29.6     11-08    136  |  104  |  25<H>  ----------------------------<  96  3.7   |  25  |  1.23    Ca    8.7      08 Nov 2022 06:05

## 2022-11-09 LAB
ANION GAP SERPL CALC-SCNC: 7 MMOL/L — SIGNIFICANT CHANGE UP (ref 5–17)
BUN SERPL-MCNC: 22 MG/DL — HIGH (ref 7–18)
CALCIUM SERPL-MCNC: 8.2 MG/DL — LOW (ref 8.4–10.5)
CHLORIDE SERPL-SCNC: 103 MMOL/L — SIGNIFICANT CHANGE UP (ref 96–108)
CO2 SERPL-SCNC: 25 MMOL/L — SIGNIFICANT CHANGE UP (ref 22–31)
CREAT SERPL-MCNC: 1.17 MG/DL — SIGNIFICANT CHANGE UP (ref 0.5–1.3)
EGFR: 60 ML/MIN/1.73M2 — SIGNIFICANT CHANGE UP
GLUCOSE SERPL-MCNC: 86 MG/DL — SIGNIFICANT CHANGE UP (ref 70–99)
HCT VFR BLD CALC: 30.4 % — LOW (ref 39–50)
HGB BLD-MCNC: 10 G/DL — LOW (ref 13–17)
MCHC RBC-ENTMCNC: 29.6 PG — SIGNIFICANT CHANGE UP (ref 27–34)
MCHC RBC-ENTMCNC: 32.9 GM/DL — SIGNIFICANT CHANGE UP (ref 32–36)
MCV RBC AUTO: 89.9 FL — SIGNIFICANT CHANGE UP (ref 80–100)
NRBC # BLD: 0 /100 WBCS — SIGNIFICANT CHANGE UP (ref 0–0)
PLATELET # BLD AUTO: 263 K/UL — SIGNIFICANT CHANGE UP (ref 150–400)
POTASSIUM SERPL-MCNC: 4 MMOL/L — SIGNIFICANT CHANGE UP (ref 3.5–5.3)
POTASSIUM SERPL-SCNC: 4 MMOL/L — SIGNIFICANT CHANGE UP (ref 3.5–5.3)
RBC # BLD: 3.38 M/UL — LOW (ref 4.2–5.8)
RBC # FLD: 13.7 % — SIGNIFICANT CHANGE UP (ref 10.3–14.5)
SODIUM SERPL-SCNC: 135 MMOL/L — SIGNIFICANT CHANGE UP (ref 135–145)
WBC # BLD: 8.2 K/UL — SIGNIFICANT CHANGE UP (ref 3.8–10.5)
WBC # FLD AUTO: 8.2 K/UL — SIGNIFICANT CHANGE UP (ref 3.8–10.5)

## 2022-11-09 RX ADMIN — Medication 12.5 MILLIGRAM(S): at 17:28

## 2022-11-09 RX ADMIN — POLYETHYLENE GLYCOL 3350 17 GRAM(S): 17 POWDER, FOR SOLUTION ORAL at 12:47

## 2022-11-09 RX ADMIN — SODIUM CHLORIDE 85 MILLILITER(S): 9 INJECTION, SOLUTION INTRAVENOUS at 06:19

## 2022-11-09 RX ADMIN — Medication 5 MILLIGRAM(S): at 05:37

## 2022-11-09 RX ADMIN — Medication 1 TABLET(S): at 12:47

## 2022-11-09 RX ADMIN — SENNA PLUS 2 TABLET(S): 8.6 TABLET ORAL at 21:50

## 2022-11-09 RX ADMIN — SODIUM CHLORIDE 85 MILLILITER(S): 9 INJECTION, SOLUTION INTRAVENOUS at 20:23

## 2022-11-09 RX ADMIN — Medication 12.5 MILLIGRAM(S): at 05:38

## 2022-11-09 NOTE — PROGRESS NOTE ADULT - PROBLEM SELECTOR PLAN 5
CONTROLLED  on home metoprolol succ 100 mg + 25 mg daily, 50mg losartan daily  cont metoprolol 12.5 mg w/parameters  hold losartan --> TERENCE  monitor BP

## 2022-11-09 NOTE — PROGRESS NOTE ADULT - SUBJECTIVE AND OBJECTIVE BOX
INTERVAL HPI/OVERNIGHT EVENTS:  No acute events overnight. Pt resting comfortably. No acute complaints.     MEDICATIONS  (STANDING):  influenza  Vaccine (HIGH DOSE) 0.7 milliLiter(s) IntraMuscular once  lactated ringers. 1000 milliLiter(s) (85 mL/Hr) IV Continuous <Continuous>  metoprolol tartrate 12.5 milliGRAM(s) Oral two times a day  multivitamin/minerals 1 Tablet(s) Oral daily  oxybutynin 5 milliGRAM(s) Oral three times a day  polyethylene glycol 3350 17 Gram(s) Oral daily  senna 2 Tablet(s) Oral at bedtime    MEDICATIONS  (PRN):  acetaminophen     Tablet .. 650 milliGRAM(s) Oral every 6 hours PRN Moderate Pain (4 - 6)  acetaminophen  Suppository .. 650 milliGRAM(s) Rectal every 6 hours PRN Temp greater or equal to 38C (100.4F)  OLANZapine 2.5 milliGRAM(s) Oral every 8 hours PRN agitation      Vital Signs Last 24 Hrs  T(C): 36.7 (09 Nov 2022 04:56), Max: 36.7 (08 Nov 2022 20:59)  T(F): 98 (09 Nov 2022 04:56), Max: 98 (08 Nov 2022 20:59)  HR: 91 (09 Nov 2022 04:56) (77 - 91)  BP: 120/59 (09 Nov 2022 04:56) (109/67 - 120/59)  BP(mean): --  RR: 17 (09 Nov 2022 04:56) (17 - 17)  SpO2: 96% (09 Nov 2022 04:56) (96% - 99%)    Parameters below as of 09 Nov 2022 04:56  Patient On (Oxygen Delivery Method): room air        Physical:  General: Alert and oriented, not in acute distress  Resp: Breathing unlabored  Abdomen: Soft, nondistended, nontender  : valencia catheter in place with clear urine output  Extremities: No pedal edema    I&O's Detail    08 Nov 2022 07:01  -  09 Nov 2022 07:00  --------------------------------------------------------  IN:    Lactated Ringers: 1020 mL    Oral Fluid: 120 mL  Total IN: 1140 mL    OUT:    Continuous Bladder Irrigation (mL): 770 mL    Indwelling Catheter - Urethral (mL): 2850 mL  Total OUT: 3620 mL    Total NET: -2480 mL          LABS:                        10.0   8.20  )-----------( 263      ( 09 Nov 2022 05:40 )             30.4             11-09    135  |  103  |  22<H>  ----------------------------<  86  4.0   |  25  |  1.17    Ca    8.2<L>      09 Nov 2022 05:40

## 2022-11-09 NOTE — PROGRESS NOTE ADULT - PROBLEM SELECTOR PLAN 2
p/w gross hematuria associated with urinary retention   -Valencia placed in ED with 2liters U/O  - c/w valencia  - Urology following

## 2022-11-09 NOTE — PROGRESS NOTE ADULT - ASSESSMENT
89 yo M w PMH A Fib/Flutter on Eliquis, HTN, dementia, p/w  AMS and acute hematuria x 1 day. Vogel Catheter placed in ED with 2 L of hematuria with minimal clots. pt admitted to medicine for acute hematuria. Ct abd notable for moderate left and mild right hydronephrosis with hydroureter. CBI strated, has intermittent clots noted. urology following.   11/8-CBI clamped by uro at 8am, remains clamped at this time. Pending renal US to evaluate hydronephrosis, TOV. f/u urology recc.  PT recc KODY, CM following.

## 2022-11-09 NOTE — PROGRESS NOTE ADULT - SUBJECTIVE AND OBJECTIVE BOX
INTERVAL HPI/OVERNIGHT EVENTS:  Patient seen,no acute issues  VITAL SIGNS:  T(F): 98 (11-09-22 @ 04:56)  HR: 78 (11-09-22 @ 10:05)  BP: 120/77 (11-09-22 @ 10:05)  RR: 17 (11-09-22 @ 04:56)  SpO2: 97% (11-09-22 @ 10:05)  Wt(kg): --    PHYSICAL EXAM:  awake  Constitutional:  Eyes:  ENMT:perrla  Neck:  Respiratory:clear  Cardiovascular:s1s2,m-none  Gastrointestinal:soft,bs pos,valencia in place  Extremities:  Vascular:  Neurological:no focal deficit  Musculoskeletal:    MEDICATIONS  (STANDING):  influenza  Vaccine (HIGH DOSE) 0.7 milliLiter(s) IntraMuscular once  lactated ringers. 1000 milliLiter(s) (85 mL/Hr) IV Continuous <Continuous>  metoprolol tartrate 12.5 milliGRAM(s) Oral two times a day  multivitamin/minerals 1 Tablet(s) Oral daily  polyethylene glycol 3350 17 Gram(s) Oral daily  senna 2 Tablet(s) Oral at bedtime    MEDICATIONS  (PRN):  acetaminophen     Tablet .. 650 milliGRAM(s) Oral every 6 hours PRN Moderate Pain (4 - 6)  acetaminophen  Suppository .. 650 milliGRAM(s) Rectal every 6 hours PRN Temp greater or equal to 38C (100.4F)  OLANZapine 2.5 milliGRAM(s) Oral every 8 hours PRN agitation      Allergies    No Known Allergies    Intolerances        LABS:                        10.0   8.20  )-----------( 263      ( 09 Nov 2022 05:40 )             30.4     11-09    135  |  103  |  22<H>  ----------------------------<  86  4.0   |  25  |  1.17    Ca    8.2<L>      09 Nov 2022 05:40            RADIOLOGY & ADDITIONAL TESTS:      Assessment and Plan:   · Assessment	  Pt is an 88 yo M w PMH unspecified cardiac arrhythmia on eliquis, HTN, dementia, p/w to hospital with AMS and acute hematuria x 1 day. Valencia Catheter placed in ED with 2 L of hematuria with minimal clots noted on. pt admitted to medicine for acute hematuria. Ct abd notable for moderate left and mild right hydronephrosis with hydroureter. continues with CBI, has intermittent clots noted. urology following.   11/7-CBI continues, drainage almost clear however when CBI rate slows down drainage gets more bloody.     Problem/Plan - 1:  ·  Problem: Bilateral hydronephrosis.   ·  Plan: p/w gross hematuria  -Urology following, note and reccs appreciated  -f/u renal US to see if hydronephrosis improved  -OP cystoscopy and CT urogram.  -d/c/ planning     Problem/Plan - 2:  ·  Problem: Obstructive uropathy.   ·  Plan: p/w gross hematuria associated with urinary retention and TERENCE  -TERENCE improved post valencia placement  -Nephro Dr. Bond following.     Problem/Plan - 3:  ·  Problem: TERENCE (acute kidney injury).   ·  Plan: likely due to obstructive uropathy  -SCr 2.15 on admission  -Ct demonstrated Bilateral hydronephrosis  -Improved SCr post valencia placement and IVF hydration  -SCr 1.39 today (11/7)  -Avoid nephrotoxic meds.     Problem/Plan - 4:  ·  Problem: Hematuria.   ·  Plan: Improving  -Urology following  -CBI continues for now  -Not tolerating decreased CBI rate ate this time  -H/H stable  -Cont holding AC  -Cont oxybutynin 5 mg TID for bladder spasm  -OP cystoscopy  -Pt would benefit from CT urogram, however elevated SCr is contraindication for contrast.     Problem/Plan - 5:  ·  Problem: HTN (hypertension).   ·  Plan: pt takes metoprolol succ 100 mg + 25 mg daily, as well as losartan daily  cont home meds w parameters.     Problem/Plan - 6:  ·  Problem: Arrhythmia.   ·  Plan: pt takes eliquis for unknown cardiac arrhythmia  hold for hematuria.     Problem/Plan - 7:  ·  Problem: Dementia.   ·  Plan: not on medications at home  -continues to try to pull valencia and PIV out  -delirium and dementia precautions  -unrestrained mittens  -olanzapine prn.     Problem/Plan - 8:  ·  Problem: Prophylactic measure.   ·  Plan: -SCD.     Problem/Plan - 9:  ·  Problem: Discharge planning issues.   ·  Plan: -pt lives at home with wife  -Pt.'s wife and daughter updated re pt.'s condition and plan of care  -PT recc KODY  -Potentially LTC placement.

## 2022-11-09 NOTE — PROGRESS NOTE ADULT - ASSESSMENT
89M with hematuria, urinary retention and TERENCE  H/H stable, valencia in place was placed on CBI which cleared up urine color; now CBI clamped.     - Urine color clear off of CBI: please call if patient develops lower abdominal pain or gross hematuria/clots  - Monitor H&H - currently stable  - Continue holding eliquis if medically safe  - Bilateral hydronephrosis and TERENCE possibly due to obstructive uropathy in setting of urinary retention: recommend repeat renal u/s to see if hydronephrosis improved  - Patient will need outpatient cystoscopy and CT urogram for gross hematuria workup  - Continue with CTX  - Continue with ditropan and B&O suppositories for bladder spasms 89M with hematuria, urinary retention and TERENCE  H/H stable, valencia in place was placed on CBI which cleared up urine color; now CBI clamped.     - Urine color clear off of CBI: please call if patient develops lower abdominal pain or gross hematuria/clots  - Monitor H&H - currently stable  - Continue holding eliquis if medically safe  - Bilateral hydronephrosis and TERENCE possibly due to obstructive uropathy in setting of urinary retention: recommend repeat renal u/s to see if hydronephrosis improved  - Patient will need outpatient cystoscopy and CT urogram for gross hematuria workup  - Continue with CTX  - D/c ditroban  - Discussed with Dr. Cee

## 2022-11-09 NOTE — PROGRESS NOTE ADULT - PROBLEM SELECTOR PLAN 4
resolved  -CBI clamped--> 11/8/22  -H/H stable  -Cont holding AC ( Eliquis)  -s/p oxybutynin   -OP cystoscopy and CT urogram  - monitor CBC,  transfuse if Hb <7

## 2022-11-09 NOTE — PROGRESS NOTE ADULT - PROBLEM SELECTOR PLAN 3
RESOLVED, likely due to obstructive uropathy  -SCr 2.15 on admission  -Ct demonstrated Bilateral hydronephrosis  -Improved SCr post valencia placement and IVF hydration  -SCr 1.17   -Avoid nephrotoxic meds  - nephro following Dr. Altamirano

## 2022-11-09 NOTE — PROGRESS NOTE ADULT - PROBLEM SELECTOR PLAN 1
p/w gross hematuria  -CT A/P shows Moderate left and mild right-sided hydronephrosis without hydroureter.    No obstructive ureterolithiasis.  -pending renal US to see if hydronephrosis improved  - TOV when clinically indicated  -OP cystoscopy and CT urogram  -Urology following

## 2022-11-09 NOTE — PROGRESS NOTE ADULT - SUBJECTIVE AND OBJECTIVE BOX
Patient is a 89y old  Male who presents with a chief complaint of hematuria (09 Nov 2022 12:22)      INTERVAL HPI/OVERNIGHT EVENTS: no overnight events    I&O's Summary    08 Nov 2022 07:01  -  09 Nov 2022 07:00  --------------------------------------------------------  IN: 1140 mL / OUT: 3620 mL / NET: -2480 mL      Vital Signs Last 24 Hrs  T(C): 37.3 (09 Nov 2022 13:16), Max: 37.3 (09 Nov 2022 13:16)  T(F): 99.1 (09 Nov 2022 13:16), Max: 99.1 (09 Nov 2022 13:16)  HR: 91 (09 Nov 2022 13:16) (77 - 91)  BP: 120/80 (09 Nov 2022 13:16) (109/67 - 120/80)  BP(mean): --  RR: 17 (09 Nov 2022 13:16) (17 - 17)  SpO2: 98% (09 Nov 2022 13:16) (96% - 99%)    Parameters below as of 09 Nov 2022 13:16  Patient On (Oxygen Delivery Method): room air      PAST MEDICAL & SURGICAL HISTORY:  Hematuria      HTN (hypertension)      Arrhythmia      Visual impairment      Dementia      TERENCE (acute kidney injury)          SOCIAL HISTORY  Alcohol:  Tobacco:  Illicit substance use:      FAMILY HISTORY:      LABS:                        10.0   8.20  )-----------( 263      ( 09 Nov 2022 05:40 )             30.4     11-09    135  |  103  |  22<H>  ----------------------------<  86  4.0   |  25  |  1.17    Ca    8.2<L>      09 Nov 2022 05:40      CAPILLARY BLOOD GLUCOSE      MEDICATIONS  (STANDING):  influenza  Vaccine (HIGH DOSE) 0.7 milliLiter(s) IntraMuscular once  lactated ringers. 1000 milliLiter(s) (85 mL/Hr) IV Continuous <Continuous>  metoprolol tartrate 12.5 milliGRAM(s) Oral two times a day  multivitamin/minerals 1 Tablet(s) Oral daily  polyethylene glycol 3350 17 Gram(s) Oral daily  senna 2 Tablet(s) Oral at bedtime    MEDICATIONS  (PRN):  acetaminophen     Tablet .. 650 milliGRAM(s) Oral every 6 hours PRN Moderate Pain (4 - 6)  acetaminophen  Suppository .. 650 milliGRAM(s) Rectal every 6 hours PRN Temp greater or equal to 38C (100.4F)  OLANZapine 2.5 milliGRAM(s) Oral every 8 hours PRN agitation      REVIEW OF SYSTEMS:  CONSTITUTIONAL: No fever  EYES: No eye pain,  ENMT:  No difficulty hearing  NECK: No pain   RESPIRATORY: No cough, No shortness of breath  CARDIOVASCULAR: No chest pain  GASTROINTESTINAL: No abdominal pain. No nausea, vomiting  GENITOURINARY: No hematuria  NEUROLOGICAL: No headaches  SKIN: No rashes, or lesions   MUSCULOSKELETAL: No joint pain     RADIOLOGY & ADDITIONAL TESTS:  < from: CT Abdomen and Pelvis No Cont (11.03.22 @ 14:30) >    ACC: [id number] EXAM:  CT ABDOMEN AND PELVIS                          PROCEDURE DATE:  11/03/2022          INTERPRETATION:  CLINICAL INFORMATION: 89-year-old male patient with   hematuria    COMPARISON: None.    CONTRAST/COMPLICATIONS:  IV Contrast: NONE  Oral Contrast: NONE  Complications: None reported at time of study completion    PROCEDURE:  CT of the Abdomen and Pelvis was performed.  Sagittal and coronal reformats were performed.    FINDINGS:  LOWER CHEST: Small right-sided pleural effusion and compression   atelectasis. Cardiomegaly. Coronary artery disease.    LIVER: Dilated hepatic veins. Slightly lobular contour of the liver, may   represent fibrosis.  BILE DUCTS: Normal caliber.  GALLBLADDER: Cholelithiasis.  SPLEEN: Within normallimits.  PANCREAS: Mild pancreatic parenchymal atrophy. No ductal dilatation.  ADRENALS: No nodules or masses.  KIDNEYS/URETERS: Bilateral hydronephrosis, moderate left and mild on the   right. Ureters are not abnormally dilated in entire length.    BLADDER: Bladder is decompressed with a Vogel catheter in place.  REPRODUCTIVE ORGANS: Prostate within normal limits.    BOWEL: No bowel obstruction. Appendix is normal. Colonic diverticulosis   without diverticulitis.  PERITONEUM: No ascites.  VESSELS: Abdominal aorta measures 2.9 cm demonstrates moderate   atherosclerotic changes.  RETROPERITONEUM/LYMPH NODES: No lymphadenopathy.  ABDOMINAL WALL: Within normal limits.  BONES: Degenerative changes of bilateral hip joints. Multilevel   discogenic degenerative disease in the lumbar spine, predominantly L2-3   and L5-S1.    IMPRESSION:  Moderate left and mild right-sided hydronephrosis without hydroureter.   Decompressed urinary bladder with a Vogel catheter in place. No   obstructive ureterolithiasis.    Cardiomegaly and right pleural effusion.      --- End of Report ---      NATE TOBIAS MD; Attending Radiologist  This document has been electronically signed. Nov  3 2022  4:28PM    < end of copied text >    ACC: [id number] EXAM:  CT BRAIN                          PROCEDURE DATE:  11/03/2022        INTERPRETATION:  Clinical indication: Confusion.    Multiple axial sections were performed from base of vertex without   contrast enhancement. Coronal and sagittal reconstructions were performed.    Parenchymal volume loss and chronic microvascular ischemic changes are   identified.    There is no acute hemorrhage mass or mass effect seen.    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    Evaluation of the osseous structures with the appropriate window appears   normal    Opacification of the right maxillary sinus is seen which is compatible   with mucosal thickening.    Both mastoid and middle ear regions appear clear.    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    --- End of Report ---    SORAYA EUGENE MD; Attending Radiologist  This document has been electronically signed. Nov  3 2022  2:37PM    Imaging Personally Reviewed:  [x ] YES  [ ] NO    Consultant(s) Notes Reviewed:  [ x] YES  [ ] NO    PHYSICAL EXAM:  GENERAL: NAD  HEAD:  Atraumatic, Normocephalic  EYES: conjunctiva and sclera clear  ENMT: Moist mucous membranes  NECK: Supple  NERVOUS SYSTEM:  Alert & Oriented X1, no deficits  CHEST/LUNG: CTA bilaterally; No rales, rhonchi, wheezing  HEART: Regular rate and rhythm  ABDOMEN: Soft, Nontender, Nondistended; Bowel sounds present  EXTREMITIES:  2+ Peripheral Pulses  SKIN: No rashes    Care Collaborated Discussed with Consultants/Other Providers [x ] YES  [ ] NO

## 2022-11-10 LAB
ANION GAP SERPL CALC-SCNC: 5 MMOL/L — SIGNIFICANT CHANGE UP (ref 5–17)
BUN SERPL-MCNC: 25 MG/DL — HIGH (ref 7–18)
CALCIUM SERPL-MCNC: 8.9 MG/DL — SIGNIFICANT CHANGE UP (ref 8.4–10.5)
CHLORIDE SERPL-SCNC: 103 MMOL/L — SIGNIFICANT CHANGE UP (ref 96–108)
CO2 SERPL-SCNC: 26 MMOL/L — SIGNIFICANT CHANGE UP (ref 22–31)
CREAT SERPL-MCNC: 1.3 MG/DL — SIGNIFICANT CHANGE UP (ref 0.5–1.3)
EGFR: 53 ML/MIN/1.73M2 — LOW
GLUCOSE SERPL-MCNC: 94 MG/DL — SIGNIFICANT CHANGE UP (ref 70–99)
HCT VFR BLD CALC: 30.5 % — LOW (ref 39–50)
HGB BLD-MCNC: 10 G/DL — LOW (ref 13–17)
MCHC RBC-ENTMCNC: 29.4 PG — SIGNIFICANT CHANGE UP (ref 27–34)
MCHC RBC-ENTMCNC: 32.8 GM/DL — SIGNIFICANT CHANGE UP (ref 32–36)
MCV RBC AUTO: 89.7 FL — SIGNIFICANT CHANGE UP (ref 80–100)
NRBC # BLD: 0 /100 WBCS — SIGNIFICANT CHANGE UP (ref 0–0)
PLATELET # BLD AUTO: 295 K/UL — SIGNIFICANT CHANGE UP (ref 150–400)
POTASSIUM SERPL-MCNC: 4 MMOL/L — SIGNIFICANT CHANGE UP (ref 3.5–5.3)
POTASSIUM SERPL-SCNC: 4 MMOL/L — SIGNIFICANT CHANGE UP (ref 3.5–5.3)
RBC # BLD: 3.4 M/UL — LOW (ref 4.2–5.8)
RBC # FLD: 13.9 % — SIGNIFICANT CHANGE UP (ref 10.3–14.5)
SARS-COV-2 RNA SPEC QL NAA+PROBE: SIGNIFICANT CHANGE UP
SODIUM SERPL-SCNC: 134 MMOL/L — LOW (ref 135–145)
WBC # BLD: 9.78 K/UL — SIGNIFICANT CHANGE UP (ref 3.8–10.5)
WBC # FLD AUTO: 9.78 K/UL — SIGNIFICANT CHANGE UP (ref 3.8–10.5)

## 2022-11-10 PROCEDURE — 76775 US EXAM ABDO BACK WALL LIM: CPT | Mod: 26

## 2022-11-10 RX ADMIN — SODIUM CHLORIDE 85 MILLILITER(S): 9 INJECTION, SOLUTION INTRAVENOUS at 06:20

## 2022-11-10 RX ADMIN — Medication 1 TABLET(S): at 11:55

## 2022-11-10 RX ADMIN — Medication 12.5 MILLIGRAM(S): at 05:29

## 2022-11-10 RX ADMIN — SENNA PLUS 2 TABLET(S): 8.6 TABLET ORAL at 22:05

## 2022-11-10 RX ADMIN — POLYETHYLENE GLYCOL 3350 17 GRAM(S): 17 POWDER, FOR SOLUTION ORAL at 11:55

## 2022-11-10 RX ADMIN — Medication 12.5 MILLIGRAM(S): at 18:27

## 2022-11-10 NOTE — PROGRESS NOTE ADULT - PROBLEM SELECTOR PLAN 4
resolved  - Hgb 10.0  -CBI clamped--> 11/8/22  -H/H stable  -Cont holding AC ( Eliquis)  -s/p oxybutynin   -OP cystoscopy and CT urogram  - monitor CBC,  transfuse if Hb <7

## 2022-11-10 NOTE — DISCHARGE NOTE PROVIDER - HOSPITAL COURSE
89 yo M w PMH A Fib/Flutter on Eliquis, HTN, dementia, p/w  AMS and acute hematuria x 1 day. Vgoel Catheter placed in ED with 2 L of hematuria with minimal clots. pt admitted to medicine for acute hematuria. Ct abd notable for moderate left and mild right hydronephrosis with hydroureter. CBI started, has intermittent clots noted. urology following.   11/8-CBI clamped by uro at 8am, removed 11/9/22. s/p renal US to evaluate hydronephrosis, which is unchanged. pending TOV. f/u urology recc. urology recc: outpatient cystoscopy and CT urogram for gross hematuria workup. PT recc KODY, CM following.        Please note that this a brief summary of hospital course please refer to daily progress notes and consult notes for full course and events   89 yo M w PMH A Fib/Flutter on Eliquis, HTN, dementia, p/w  AMS and acute hematuria x 1 day. Valencia Catheter placed in ED with 2 L of hematuria with minimal clots. pt admitted to medicine for acute hematuria. Ct abd notable for moderate left and mild right hydronephrosis with hydroureter. CBI started, has intermittent clots noted. urology following.   11/8-CBI clamped by uro at 8am. removed 11/9- cbi. s/p renal US to evaluate hydronephrosis, unchanged hydro CT urogram confirms. Reccomended to continue valencia and have outpatient cystoscopy with urology. PT recc KODY ([place]) Eliquis resumed 11/14 plan to monitor for bleeding   Discharge discussed with attending   Please note that this a brief summary of hospital course please refer to daily progress notes and consult notes for full course and events

## 2022-11-10 NOTE — PROGRESS NOTE PEDS - ASSESSMENT
89M with hematuria, urinary retention and TERENCE  H/H stable, vss   valencia in place; was placed on CBI which cleared up urine color; s/p CBI removal 11/9; draining yellow clear urine     - Urine color clear off of CBI: please call if patient develops lower abdominal pain or gross hematuria/clots  - Monitor H&H   - f/u renal us read to see if hydronephrosis improved   - Patient will need outpatient cystoscopy and CT urogram for gross hematuria workup  - Discussed with Dr. Cee

## 2022-11-10 NOTE — PROGRESS NOTE ADULT - PROBLEM SELECTOR PLAN 6
eliquis for AF/Flutter   EKG 11/9/22-aflutter  hold  ELIQUIS for hematuria  f/u when to resume ELIQUIS

## 2022-11-10 NOTE — PROGRESS NOTE ADULT - ASSESSMENT
89 yo M w PMH A Fib/Flutter on Eliquis, HTN, dementia, p/w  AMS and acute hematuria x 1 day. Vogel Catheter placed in ED with 2 L of hematuria with minimal clots. pt admitted to medicine for acute hematuria. Ct abd notable for moderate left and mild right hydronephrosis with hydroureter. CBI strated, has intermittent clots noted. urology following.   11/8-CBI clamped by uro at 8am, remains clamped at this time. s/p renal US to evaluate hydronephrosis, f/u results. pending TOV. f/u urology recc.  PT recc KODY, CM following.

## 2022-11-10 NOTE — DISCHARGE NOTE PROVIDER - NSDCCPCAREPLAN_GEN_ALL_CORE_FT
PRINCIPAL DISCHARGE DIAGNOSIS  Diagnosis: Hematuria  Assessment and Plan of Treatment: YOu presented to the ED with complaints of blood in your urine which is called hematuria. Hematuria is blood in your urine.  This can be caused by an infection, kidney stone, kidney disease, enlarged prostate, intense exercise, trauma or certain types of cancer.  YOu had continuous bladder irrigation and your hematuria has resolved.     Return to the emergency department if:  You have blood in your urine after a new injury, such as a fall.  You have severe back or side pain that does not go away with treatment.  Call your doctor if:  You are urinating very small amounts or not at all.  You feel like you cannot empty your bladder.  You have a fever that gets worse or does not go away with treatment.  You cannot keep liquids or medicines down.  Your urine gets darker, even after you drink extra liquids.  Follow up with your primary doctor or Urologist.  Urology reccomends: follow up outpatient to have a cystoscopy and CT urogram.  Take all of your medications as presribed.      SECONDARY DISCHARGE DIAGNOSES  Diagnosis: Bilateral hydronephrosis  Assessment and Plan of Treatment: You were found to have hydronephrosis on your Ct scan. Hydronephrosis is swelling in one or both kidneys caused by urine buildup.  A blockage in the ureters can prevent urine from flowing properly.   YOu were treated with Antibiotics fight or prevent an infection caused by bacteria. you had a valencia placed in the ED which drained 2 liters of urine. you were followed by urology team which reccomends......................  Follow up with your urologist as directed.  Contact your healthcare provider if:  Your abdomen feels full.  You have a change in how much or how often you urinate.  You urinate more times at night and in larger amounts than during the day.  You have mild lower back pain or pain on one side when you urinate.  Seek care immediately if:  You have severe, stabbing back pain.  You have blood in your urine.  You cannot urinate, or you urinate very little.    Diagnosis: Atrial flutter  Assessment and Plan of Treatment: Atrial fibrillation/ a flutter is the most common heart rhythm problem & has the risk of stroke & heart attack.  It helps if you control your blood pressure, not drink more than 1-2 alcohol drinks per day, cut down on caffeine, getting treatment for over active thyroid gland, & getting exercise  Call your doctor if you feel your heart racing or beating unusually, chest tightness or pain, lightheaded, faint, shortness of breath especially with exercise  It is important to take your heart medication as prescribed.  We stopped your Eliquis which you were in the hospital. resume your Eliquis......................................................    Diagnosis: TERENCE (acute kidney injury)  Assessment and Plan of Treatment: Acute kidney injury (TERENCE)  happens when your kidneys suddenly stop working correctly. Normally, the kidneys remove fluid, chemicals, and waste from your blood.   Call 911 if:  You have sudden chest pain or trouble breathing.  Seek care immediately if:  Your symptoms get worse.  Drink liquids as directed: Your healthcare provider may recommend that you drink a certain amount of liquids. This will help your kidneys work better and decrease your risk for dehydration.   Prevent acute kidney injury:  Manage other health conditions such as diabetes, high blood pressure, or heart disease. These conditions increase your risk for acute kidney injury. Take your medicines for these conditions as directed.   Tell healthcare providers you have had acute kidney injury before you get contrast liquid for an x-ray or CT scan. Your healthcare provider may give you medicine to prevent kidney problems caused by the liquid.  Follow up with your healthcare provider as directed.    Diagnosis: Dementia  Assessment and Plan of Treatment: You have a history of dementia.  Create a safe environment.  Keep familiar objects and people around.  Ensure a regular walking or physical activity schedule.  Encourage good nutrition and hydration.  Reduce distractions during meal times and snacks..  Monitor chewing and swallowing ability.  Continue with routine vision, hearing, dental, and medical screenings.   SEEK MEDICAL CARE IF:  New behavioral problems start such as moodiness, aggressiveness, or seeing things that are not there (hallucinations).  New problems with balance, speech, or falling a lot.  Problems with swallowing develop.  Small changes or worsening in any aspect of brain function can be a sign that the illness is getting worse. It can also be a sign of another medical illness such as infection. Seeing a caregiver right away is important.  SEEK IMMEDIATE MEDICAL CARE IF:  A fever develops.  New or worsened confusion develops.  New or worsened sleepiness develops.  Staying awake becomes hard to do.      Diagnosis: HTN (hypertension)  Assessment and Plan of Treatment: You have a history of high blood pressure. High blood pressure is a condition that puts you at risk for heart attack, stroke and kidney disease. Please continue to take your medications as prescribed. You can also help control your blood pressure by maintaining a healthy weight, eating a diet low in fat and rich in fruits and vegetables, reduce the amount of salt in your diet. Also, reduce alcohol and try to include some form of physical activity daily for at least 30 mins. Follow up with your medical doctor to establish long term blood pressure treatment goals.  Notify your doctor if you have any of the following symptoms:   Dizziness, Lightheadedness, Blurry vision, Headache, Chest pain, Shortness of breath       PRINCIPAL DISCHARGE DIAGNOSIS  Diagnosis: Hematuria  Assessment and Plan of Treatment: YOu presented to the ED with complaints of blood in your urine which is called hematuria. Hematuria is blood in your urine.  This can be caused by an infection, kidney stone, kidney disease, enlarged prostate, intense exercise, trauma or certain types of cancer.  YOu had continuous bladder irrigation and your hematuria has resolved.     Return to the emergency department if:  You have blood in your urine   You have severe back or side pain that does not go away with treatment.  Call your doctor if:  ***You have dark or red urine   You are urinating very small amounts or not at all.  You feel like you cannot empty your bladder.  You have a fever that gets worse or does not go away with treatment.  You cannot keep liquids or medicines down.  Your urine gets darker, even after you drink extra liquids.  Follow up with your primary doctor or Urologist.  Urology reccomends: follow up outpatient to have a cystoscopy and CT urogram. Take all of your medications as presribed.      SECONDARY DISCHARGE DIAGNOSES  Diagnosis: TERENCE (acute kidney injury)  Assessment and Plan of Treatment: Acute kidney injury (TERENCE)  happens when your kidneys suddenly stop working correctly. Normally, the kidneys remove fluid, chemicals, and waste from your blood.   Call 911 if:  You have sudden chest pain or trouble breathing.  Seek care immediately if:  Your symptoms get worse.  Drink liquids as directed: Your healthcare provider may recommend that you drink a certain amount of liquids. This will help your kidneys work better and decrease your risk for dehydration.   Prevent acute kidney injury:  Manage other health conditions such as diabetes, high blood pressure, or heart disease. These conditions increase your risk for acute kidney injury. Take your medicines for these conditions as directed.   Tell healthcare providers you have had acute kidney injury before you get contrast liquid for an x-ray or CT scan. Your healthcare provider may give you medicine to prevent kidney problems caused by the liquid.  Follow up with your healthcare provider as directed.    Diagnosis: Dementia  Assessment and Plan of Treatment: You have a history of dementia.  Create a safe environment.  Keep familiar objects and people around.  Ensure a regular walking or physical activity schedule.  Encourage good nutrition and hydration.  Reduce distractions during meal times and snacks..  Monitor chewing and swallowing ability.  Continue with routine vision, hearing, dental, and medical screenings.   SEEK MEDICAL CARE IF:  New behavioral problems start such as moodiness, aggressiveness, or seeing things that are not there (hallucinations).  New problems with balance, speech, or falling a lot.  Problems with swallowing develop.  Small changes or worsening in any aspect of brain function can be a sign that the illness is getting worse. It can also be a sign of another medical illness such as infection. Seeing a caregiver right away is important.  SEEK IMMEDIATE MEDICAL CARE IF:  A fever develops.  New or worsened confusion develops.  New or worsened sleepiness develops.  Staying awake becomes hard to do.      Diagnosis: Atrial flutter  Assessment and Plan of Treatment: Atrial fibrillation/ a flutter is the most common heart rhythm problem & has the risk of stroke & heart attack.  It helps if you control your blood pressure, not drink more than 1-2 alcohol drinks per day, cut down on caffeine, getting treatment for over active thyroid gland, & getting exercise  Call your doctor if you feel your heart racing or beating unusually, chest tightness or pain, lightheaded, faint, shortness of breath especially with exercise  It is important to take your heart medication as prescribed.  We stopped your Eliquis which you were in the hospital. resume your Eliquis and monitor for bleeding    Diagnosis: HTN (hypertension)  Assessment and Plan of Treatment: You have a history of high blood pressure. High blood pressure is a condition that puts you at risk for heart attack, stroke and kidney disease. Please continue to take your medications as prescribed. You can also help control your blood pressure by maintaining a healthy weight, eating a diet low in fat and rich in fruits and vegetables, reduce the amount of salt in your diet. Also, reduce alcohol and try to include some form of physical activity daily for at least 30 mins. Follow up with your medical doctor to establish long term blood pressure treatment goals.  Notify your doctor if you have any of the following symptoms:   Dizziness, Lightheadedness, Blurry vision, Headache, Chest pain, Shortness of breath      Diagnosis: Bilateral hydronephrosis  Assessment and Plan of Treatment: You were found to have hydronephrosis on your Ct scan. Hydronephrosis is swelling in one or both kidneys caused by urine buildup.  A blockage in the ureters can prevent urine from flowing properly.   YOu were treated with Antibiotics fight or prevent an infection caused by bacteria. you had a valencia placed in the ED which drained 2 liters of urine. you were followed by urology team which reccomends......................  Follow up with your urologist as directed.  Contact your healthcare provider if:  Your abdomen feels full.  You have a change in how much or how often you urinate.  You urinate more times at night and in larger amounts than during the day.  You have mild lower back pain or pain on one side when you urinate.  Seek care immediately if:  You have severe, stabbing back pain.  You have blood in your urine.  You cannot urinate, or you urinate very little.

## 2022-11-10 NOTE — PROGRESS NOTE ADULT - SUBJECTIVE AND OBJECTIVE BOX
Patient is a 89y old  Male who presents with a chief complaint of hematuria/hydronephrosis (09 Nov 2022 15:02)      INTERVAL HPI/OVERNIGHT EVENTS: no overnight events    I&O's Summary    09 Nov 2022 07:01  -  10 Nov 2022 07:00  --------------------------------------------------------  IN: 1175 mL / OUT: 1650 mL / NET: -475 mL      Vital Signs Last 24 Hrs  T(C): 36.8 (10 Nov 2022 05:17), Max: 37.3 (09 Nov 2022 13:16)  T(F): 98.3 (10 Nov 2022 05:17), Max: 99.1 (09 Nov 2022 13:16)  HR: 81 (10 Nov 2022 12:38) (81 - 93)  BP: 118/70 (10 Nov 2022 12:38) (113/68 - 120/80)  BP(mean): --  RR: 18 (10 Nov 2022 05:17) (17 - 18)  SpO2: 98% (10 Nov 2022 12:38) (95% - 98%)    Parameters below as of 10 Nov 2022 12:38  Patient On (Oxygen Delivery Method): room air      PAST MEDICAL & SURGICAL HISTORY:  Hematuria      HTN (hypertension)      Arrhythmia      Visual impairment      Dementia      TERENCE (acute kidney injury)          SOCIAL HISTORY  Alcohol:  Tobacco:  Illicit substance use:      FAMILY HISTORY:      LABS:                        10.0   9.78  )-----------( 295      ( 10 Nov 2022 05:59 )             30.5     11-10    134<L>  |  103  |  25<H>  ----------------------------<  94  4.0   |  26  |  1.30    Ca    8.9      10 Nov 2022 05:59    CAPILLARY BLOOD GLUCOSE    MEDICATIONS  (STANDING):  influenza  Vaccine (HIGH DOSE) 0.7 milliLiter(s) IntraMuscular once  metoprolol tartrate 12.5 milliGRAM(s) Oral two times a day  multivitamin/minerals 1 Tablet(s) Oral daily  polyethylene glycol 3350 17 Gram(s) Oral daily  senna 2 Tablet(s) Oral at bedtime    MEDICATIONS  (PRN):  acetaminophen     Tablet .. 650 milliGRAM(s) Oral every 6 hours PRN Moderate Pain (4 - 6)  acetaminophen  Suppository .. 650 milliGRAM(s) Rectal every 6 hours PRN Temp greater or equal to 38C (100.4F)  OLANZapine 2.5 milliGRAM(s) Oral every 8 hours PRN agitation      REVIEW OF SYSTEMS: limited  EYES: No eye pain  ENMT: No sinus or throat pain  NECK: No pain   RESPIRATORY: No cough, No shortness of breath  CARDIOVASCULAR: No chest pain  GASTROINTESTINAL: No abdominal pain  GENITOURINARY: No dysuria  NEUROLOGICAL: No headache    RADIOLOGY & ADDITIONAL TESTS:  < from: CT Abdomen and Pelvis No Cont (11.03.22 @ 14:30) >    ACC: [id number] EXAM:  CT ABDOMEN AND PELVIS                          PROCEDURE DATE:  11/03/2022          INTERPRETATION:  CLINICAL INFORMATION: 89-year-old male patient with   hematuria    COMPARISON: None.    CONTRAST/COMPLICATIONS:  IV Contrast: NONE  Oral Contrast: NONE  Complications: None reported at time of study completion    PROCEDURE:  CT of the Abdomen and Pelvis was performed.  Sagittal and coronal reformats were performed.    FINDINGS:  LOWER CHEST: Small right-sided pleural effusion and compression   atelectasis. Cardiomegaly. Coronary artery disease.    LIVER: Dilated hepatic veins. Slightly lobular contour of the liver, may   represent fibrosis.  BILE DUCTS: Normal caliber.  GALLBLADDER: Cholelithiasis.  SPLEEN: Within normallimits.  PANCREAS: Mild pancreatic parenchymal atrophy. No ductal dilatation.  ADRENALS: No nodules or masses.  KIDNEYS/URETERS: Bilateral hydronephrosis, moderate left and mild on the   right. Ureters are not abnormally dilated in entire length.    BLADDER: Bladder is decompressed with a Vogel catheter in place.  REPRODUCTIVE ORGANS: Prostate within normal limits.    BOWEL: No bowel obstruction. Appendix is normal. Colonic diverticulosis   without diverticulitis.  PERITONEUM: No ascites.  VESSELS: Abdominal aorta measures 2.9 cm demonstrates moderate   atherosclerotic changes.  RETROPERITONEUM/LYMPH NODES: No lymphadenopathy.  ABDOMINAL WALL: Within normal limits.  BONES: Degenerative changes of bilateral hip joints. Multilevel   discogenic degenerative disease in the lumbar spine, predominantly L2-3   and L5-S1.    IMPRESSION:  Moderate left and mild right-sided hydronephrosis without hydroureter.   Decompressed urinary bladder with a Vogel catheter in place. No   obstructive ureterolithiasis.    Cardiomegaly and right pleural effusion.      --- End of Report ---    < end of copied text >    ACC: [id number] EXAM:  CT BRAIN                          PROCEDURE DATE:  11/03/2022          INTERPRETATION:  Clinical indication: Confusion.    Multiple axial sections were performed from base of vertex without   contrast enhancement. Coronal and sagittal reconstructions were performed.    Parenchymal volume loss and chronic microvascular ischemic changes are   identified.    There is no acute hemorrhage mass or mass effect seen.    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    Evaluation of the osseous structures with the appropriate window appears   normal    Opacification of the right maxillary sinus is seen which is compatible   with mucosal thickening.    Both mastoid and middle ear regions appear clear.    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    --- End of Report ---    SORAYA EUGENE MD; Attending Radiologist  This document has been electronically signed. Nov  3 2022  2:37PM    Imaging Personally Reviewed:  [x ] YES  [ ] NO    Consultant(s) Notes Reviewed:  [x ] YES  [ ] NO    PHYSICAL EXAM:  GENERAL: NAD  HEAD:  Atraumatic, Normocephalic  EYES: conjunctiva and sclera clear  ENMT: Moist mucous membranes  NECK: Supple  NERVOUS SYSTEM:  Alert & Oriented X1-2  CHEST/LUNG: CTA bilaterally  HEART: Regular rate   ABDOMEN: Soft, Nontender, Nondistended; Bowel sounds present  EXTREMITIES:  2+ Peripheral Pulses  SKIN: No rashes or lesions    Care Collaborated Discussed with Consultants/Other Providers [x ] YES  [ ] NO

## 2022-11-10 NOTE — DISCHARGE NOTE PROVIDER - PROVIDER TOKENS
PROVIDER:[TOKEN:[[id number]],FOLLOWUP:[2 weeks],ESTABLISHEDPATIENT:[T]] PROVIDER:[TOKEN:[[id number]],FOLLOWUP:[2 weeks],ESTABLISHEDPATIENT:[T]],PROVIDER:[TOKEN:[[id number]],FOLLOWUP:[1 week]] PROVIDER:[TOKEN:[[id number]],FOLLOWUP:[2 weeks],ESTABLISHEDPATIENT:[T]],PROVIDER:[TOKEN:[[id number]],FOLLOWUP:[1 week]],PROVIDER:[TOKEN:[[id number]],FOLLOWUP:[1 week]]

## 2022-11-10 NOTE — DISCHARGE NOTE PROVIDER - CARE PROVIDER_API CALL
DOMITILA BEARDEN  [facility]  [address] [place] [address]  Phone: [phone]  Fax: ()-  Established Patient  Follow Up Time: 2 weeks   DOMITILA BEARDEN  [facility]  [address] [place] [address]  Phone: ()-  Fax: ()-  Established Patient  Follow Up Time: 2 weeks    Marcello Cee)  Urology  [address] [place] [address]  Phone: [phone]  Fax: [phone]  Follow Up Time: 1 week   DOMITILA BEARDEN  [facility]  [address] [place] [address]  Phone: ()-  Fax: ()-  Established Patient  Follow Up Time: 2 weeks    Marcello Cee)  Urology  [address] [place] [address]  Phone: [phone]  Fax: [phone]  Follow Up Time: 1 week    Inessa Bond  INTERNAL MEDICINE  [address] [place] [address]  Phone: [phone]  Fax: [phone]  Follow Up Time: 1 week

## 2022-11-10 NOTE — PROGRESS NOTE ADULT - PROBLEM SELECTOR PLAN 1
p/w gross hematuria  -CT A/P shows Moderate left and mild right-sided hydronephrosis without hydroureter.    No obstructive ureterolithiasis.  -f/u renal US to see if hydronephrosis improved  - TOV when clinically indicated  -OP cystoscopy and CT urogram  -Urology following

## 2022-11-10 NOTE — DISCHARGE NOTE PROVIDER - NSDCMRMEDTOKEN_GEN_ALL_CORE_FT
Centrum Silver oral tablet: 1 tab(s) orally once a day  Eliquis 5 mg oral tablet: 1 tab(s) orally 2 times a day  losartan 50 mg oral tablet: 1 tab(s) orally once a day  metoprolol succinate 100 mg oral tablet, extended release: 1 tab(s) orally once a day  metoprolol succinate 25 mg oral tablet, extended release: 1 tab(s) orally once a day  PreserVision AREDS 2 oral tablet, chewable: 1 tab(s) orally 2 times a day  Vitamin C:   Vitamin D3:    acetaminophen 325 mg oral tablet: 2 tab(s) orally every 6 hours, As needed, Moderate Pain (4 - 6)  acetaminophen 650 mg rectal suppository: 1 suppository(ies) rectal every 6 hours, As needed, Temp greater or equal to 38C (100.4F)  Centrum Silver oral tablet: 1 tab(s) orally once a day  Eliquis 5 mg oral tablet: 1 tab(s) orally 2 times a day  metoprolol: 12.5 milligram(s) orally 2 times a day  OLANZapine 2.5 mg oral tablet: 1 tab(s) orally every 8 hours, As needed, agitation  polyethylene glycol 3350 oral powder for reconstitution: 17 gram(s) orally once a day  PreserVision AREDS 2 oral tablet, chewable: 1 tab(s) orally 2 times a day  senna leaf extract oral tablet: 2 tab(s) orally once a day (at bedtime)  Vitamin C:   Vitamin D3:

## 2022-11-10 NOTE — PROGRESS NOTE ADULT - PROBLEM SELECTOR PLAN 3
RESOLVED, likely due to obstructive uropathy  -SCr 2.15 on admission  -Ct demonstrated Bilateral hydronephrosis  -Improved SCr post valencia placement and IVF hydration  -SCr 1.17 -->1.30  -Avoid nephrotoxic meds  - nephro following Dr. Altamirano

## 2022-11-11 LAB
ANION GAP SERPL CALC-SCNC: 7 MMOL/L — SIGNIFICANT CHANGE UP (ref 5–17)
BUN SERPL-MCNC: 7 MG/DL — SIGNIFICANT CHANGE UP (ref 7–18)
CALCIUM SERPL-MCNC: 8.8 MG/DL — SIGNIFICANT CHANGE UP (ref 8.4–10.5)
CHLORIDE SERPL-SCNC: 99 MMOL/L — SIGNIFICANT CHANGE UP (ref 96–108)
CO2 SERPL-SCNC: 29 MMOL/L — SIGNIFICANT CHANGE UP (ref 22–31)
CREAT SERPL-MCNC: 0.61 MG/DL — SIGNIFICANT CHANGE UP (ref 0.5–1.3)
CULTURE RESULTS: SIGNIFICANT CHANGE UP
CULTURE RESULTS: SIGNIFICANT CHANGE UP
EGFR: 92 ML/MIN/1.73M2 — SIGNIFICANT CHANGE UP
GLUCOSE SERPL-MCNC: 193 MG/DL — HIGH (ref 70–99)
HCT VFR BLD CALC: 34.3 % — LOW (ref 39–50)
HGB BLD-MCNC: 10.9 G/DL — LOW (ref 13–17)
MCHC RBC-ENTMCNC: 25.1 PG — LOW (ref 27–34)
MCHC RBC-ENTMCNC: 31.8 GM/DL — LOW (ref 32–36)
MCV RBC AUTO: 78.9 FL — LOW (ref 80–100)
NRBC # BLD: 0 /100 WBCS — SIGNIFICANT CHANGE UP (ref 0–0)
PLATELET # BLD AUTO: 213 K/UL — SIGNIFICANT CHANGE UP (ref 150–400)
POTASSIUM SERPL-MCNC: 3.2 MMOL/L — LOW (ref 3.5–5.3)
POTASSIUM SERPL-SCNC: 3.2 MMOL/L — LOW (ref 3.5–5.3)
RBC # BLD: 4.35 M/UL — SIGNIFICANT CHANGE UP (ref 4.2–5.8)
RBC # FLD: 18.6 % — HIGH (ref 10.3–14.5)
SODIUM SERPL-SCNC: 135 MMOL/L — SIGNIFICANT CHANGE UP (ref 135–145)
SPECIMEN SOURCE: SIGNIFICANT CHANGE UP
SPECIMEN SOURCE: SIGNIFICANT CHANGE UP
WBC # BLD: 3.59 K/UL — LOW (ref 3.8–10.5)
WBC # FLD AUTO: 3.59 K/UL — LOW (ref 3.8–10.5)

## 2022-11-11 RX ORDER — POTASSIUM CHLORIDE 20 MEQ
40 PACKET (EA) ORAL ONCE
Refills: 0 | Status: COMPLETED | OUTPATIENT
Start: 2022-11-11 | End: 2022-11-11

## 2022-11-11 RX ORDER — SODIUM CHLORIDE 9 MG/ML
1000 INJECTION INTRAMUSCULAR; INTRAVENOUS; SUBCUTANEOUS
Refills: 0 | Status: DISCONTINUED | OUTPATIENT
Start: 2022-11-11 | End: 2022-11-12

## 2022-11-11 RX ADMIN — POLYETHYLENE GLYCOL 3350 17 GRAM(S): 17 POWDER, FOR SOLUTION ORAL at 13:38

## 2022-11-11 RX ADMIN — Medication 12.5 MILLIGRAM(S): at 05:55

## 2022-11-11 RX ADMIN — Medication 1 TABLET(S): at 13:38

## 2022-11-11 RX ADMIN — Medication 12.5 MILLIGRAM(S): at 18:12

## 2022-11-11 RX ADMIN — SENNA PLUS 2 TABLET(S): 8.6 TABLET ORAL at 21:58

## 2022-11-11 RX ADMIN — Medication 40 MILLIEQUIVALENT(S): at 13:38

## 2022-11-11 RX ADMIN — SODIUM CHLORIDE 100 MILLILITER(S): 9 INJECTION INTRAMUSCULAR; INTRAVENOUS; SUBCUTANEOUS at 13:39

## 2022-11-11 NOTE — PROGRESS NOTE ADULT - SUBJECTIVE AND OBJECTIVE BOX
INTERVAL HPI/OVERNIGHT EVENTS:    No acute events overnight.   Pt resting comfortably. No acute complaints.       MEDICATIONS  (STANDING):  influenza  Vaccine (HIGH DOSE) 0.7 milliLiter(s) IntraMuscular once  metoprolol tartrate 12.5 milliGRAM(s) Oral two times a day  multivitamin/minerals 1 Tablet(s) Oral daily  polyethylene glycol 3350 17 Gram(s) Oral daily  senna 2 Tablet(s) Oral at bedtime    MEDICATIONS  (PRN):  acetaminophen     Tablet .. 650 milliGRAM(s) Oral every 6 hours PRN Moderate Pain (4 - 6)  acetaminophen  Suppository .. 650 milliGRAM(s) Rectal every 6 hours PRN Temp greater or equal to 38C (100.4F)  OLANZapine 2.5 milliGRAM(s) Oral every 8 hours PRN agitation      Vital Signs Last 24 Hrs  T(C): 36.6 (11 Nov 2022 05:18), Max: 37.5 (10 Nov 2022 20:21)  T(F): 97.8 (11 Nov 2022 05:18), Max: 99.5 (10 Nov 2022 20:21)  HR: 96 (11 Nov 2022 05:18) (81 - 96)  BP: 124/80 (11 Nov 2022 05:18) (115/73 - 124/80)  BP(mean): --  RR: 18 (11 Nov 2022 05:18) (18 - 18)  SpO2: 95% (11 Nov 2022 05:18) (95% - 98%)    Parameters below as of 11 Nov 2022 05:18  Patient On (Oxygen Delivery Method): room air          PHYSICAL EXAM  General: Alert and oriented, not in acute distress  Resp: Breathing unlabored  Abdomen: Soft, nondistended, nontender  : valencia clear      I&O's Detail    10 Nov 2022 07:01  -  11 Nov 2022 07:00  --------------------------------------------------------  IN:    Oral Fluid: 200 mL  Total IN: 200 mL    OUT:    Indwelling Catheter - Urethral (mL): 2200 mL  Total OUT: 2200 mL    Total NET: -2000 mL          LABS:                        10.0   9.78  )-----------( 295      ( 10 Nov 2022 05:59 )             30.5             11-10    134<L>  |  103  |  25<H>  ----------------------------<  94  4.0   |  26  |  1.30    Ca    8.9      10 Nov 2022 05:59

## 2022-11-11 NOTE — DIETITIAN INITIAL EVALUATION ADULT - OTHER INFO
Pt lives home with family PTA, alert, confused with dementia, Limited intake/wt change history data available at present; feeding self after tray set-up. 26-50% intake observed at [facility] today, 26-50% to 51-75% intake at times per flowsheet; Unknown food allergies per Chart; Pending discharge planning to skilled nursing facility for rehab when medically ready  Pt lives home with family PTA, alert, confused with dementia, Limited intake/wt change history data available at present; feeding self after tray set-up. 26-50% intake observed at breakfast today, 26-50% to 51-75% intake at times per flowsheet; Unknown food allergies per Chart; Pending discharge planning to skilled nursing facility for rehab when medically ready

## 2022-11-11 NOTE — PROGRESS NOTE ADULT - PROBLEM SELECTOR PLAN 1
p/w gross hematuria  -CT A/P shows Moderate left and mild right-sided hydronephrosis without hydroureter.    No obstructive ureterolithiasis.  -renal US hydronephrosis unchanged  - c/w valencia  - Plan: CT urogram  - TOV when clinically indicated  -OP cystoscopy   -Urology following

## 2022-11-11 NOTE — DIETITIAN INITIAL EVALUATION ADULT - ETIOLOGY
acute on chronic comorbidities; cognitive limitation  acute on chronic comorbidities; cognitive limitation; increased needs for wound healing

## 2022-11-11 NOTE — PROGRESS NOTE ADULT - ASSESSMENT
TERENCE post renal due to neurogenic versus obstructive uropathy  Macroscopic hematuria with un clear source.  Renal function improved after valencia catheter placement present creatinine 1.45  Urology would like to follow with CT urogram , with radiocontrast, agree to the test but initiate iv fluids  cc per hours 3 hours before the procedure and 6 hours after .

## 2022-11-11 NOTE — PROGRESS NOTE ADULT - ASSESSMENT
89 yo M w PMH A Fib/Flutter on Eliquis, HTN, dementia, p/w  AMS and acute hematuria x 1 day. Vogel Catheter placed in ED with 2 L of hematuria with minimal clots. pt admitted to medicine for acute hematuria. Ct abd notable for moderate left and mild right hydronephrosis with hydroureter. CBI started, has intermittent clots noted. urology following.   11/8-CBI clamped by uro at 8am. removed 11/9- cbi. s/p renal US to evaluate hydronephrosis, unchanged hydro. urology recc: CT urogram, pending exam for obstructive uropathy. f/u result and uro recc after exam. PT recc [facility] ([place]), CM following.

## 2022-11-11 NOTE — DIETITIAN INITIAL EVALUATION ADULT - NSFNSGIIOFT_GEN_A_CORE
IBW=   TAN=306 lb   Wt data in EMR: 195 lb 11/3/22; 181.2 lb 11/4/22, ? change may due to scale/fluid variance

## 2022-11-11 NOTE — PROGRESS NOTE ADULT - SUBJECTIVE AND OBJECTIVE BOX
Problem List:  TERENCE obstructive uropathy  Renal function improved    PAST MEDICAL & SURGICAL HISTORY:  Hematuria      HTN (hypertension)      Arrhythmia      Visual impairment      Dementia      TERENCE (acute kidney injury)          No Known Allergies      MEDICATIONS  (STANDING):  influenza  Vaccine (HIGH DOSE) 0.7 milliLiter(s) IntraMuscular once  metoprolol tartrate 12.5 milliGRAM(s) Oral two times a day  multivitamin/minerals 1 Tablet(s) Oral daily  polyethylene glycol 3350 17 Gram(s) Oral daily  potassium chloride    Tablet ER 40 milliEquivalent(s) Oral once  senna 2 Tablet(s) Oral at bedtime    MEDICATIONS  (PRN):  acetaminophen     Tablet .. 650 milliGRAM(s) Oral every 6 hours PRN Moderate Pain (4 - 6)  acetaminophen  Suppository .. 650 milliGRAM(s) Rectal every 6 hours PRN Temp greater or equal to 38C (100.4F)  OLANZapine 2.5 milliGRAM(s) Oral every 8 hours PRN agitation                            10.9   3.59  )-----------( 213      ( 11 Nov 2022 10:39 )             34.3     11-11    135  |  99  |  7   ----------------------------<  193<H>  3.2<L>   |  29  |  0.61    Ca    8.8      11 Nov 2022 10:39      TECHNIQUE: Sonography of the kidneys and bladder.    FINDINGS: Right kidney 11.1 left 11.6 cm long dimension. Bilateral   mild-to-moderate hydronephrosis not significantly changed compared to   prior CT. No renal calculus solid or cystic masses or perirenal   collections.  Bladder collapsed with Vogel      IMPRESSION:  Bilateral mild to moderate hydronephrosis not significantly changed   compared to CT of 11/3/2022          REVIEW OF SYSTEMS:  dementia      VITALS:  T(F): 97.8 (11-11-22 @ 05:18), Max: 99.5 (11-10-22 @ 20:21)  HR: 96 (11-11-22 @ 05:18)  BP: 124/80 (11-11-22 @ 05:18)  RR: 18 (11-11-22 @ 05:18)  SpO2: 95% (11-11-22 @ 05:18)  Wt(kg): --    11-10 @ 07:01  -  11-11 @ 07:00  --------------------------------------------------------  IN: 200 mL / OUT: 2200 mL / NET: -2000 mL        PHYSICAL EXAM:  Constitutional: well developed, no diaphoresis, no distress.  Neck: No JVD, no carotid bruit, supple, no adenopathy  Respiratory: Good air entrance B/L, no wheezes, rales or rhonchi  Cardiovascular: S1, S2, RRR, no pericardial rub, no murmur  Abdomen: BS+, soft, no tenderness, no bruit  Pelvis: bladder nondistended  confused

## 2022-11-11 NOTE — CHART NOTE - NSCHARTNOTEFT_GEN_A_CORE
Spoke to  MISTYPawel Han today, updated on clinical status, treatment plan/options and discharge plan/options, all questions answered

## 2022-11-11 NOTE — DIETITIAN INITIAL EVALUATION ADULT - PERTINENT MEDS FT
MEDICATIONS  (STANDING):  influenza  Vaccine (HIGH DOSE) 0.7 milliLiter(s) IntraMuscular once  metoprolol tartrate 12.5 milliGRAM(s) Oral two times a day  multivitamin/minerals 1 Tablet(s) Oral daily  polyethylene glycol 3350 17 Gram(s) Oral daily  senna 2 Tablet(s) Oral at bedtime    MEDICATIONS  (PRN):  acetaminophen     Tablet .. 650 milliGRAM(s) Oral every 6 hours PRN Moderate Pain (4 - 6)  acetaminophen  Suppository .. 650 milliGRAM(s) Rectal every 6 hours PRN Temp greater or equal to 38C (100.4F)  OLANZapine 2.5 milliGRAM(s) Oral every 8 hours PRN agitation

## 2022-11-11 NOTE — DIETITIAN INITIAL EVALUATION ADULT - FACTORS AFF FOOD INTAKE
acute on chronic comorbidities including advanced age with dementia/change in mental status/difficulty chewing/difficulty with food procurement/preparation

## 2022-11-11 NOTE — PROGRESS NOTE ADULT - PROBLEM SELECTOR PLAN 4
resolved  - Hgb 10.9 stable  -s/p CBI  -H/H stable  -Cont holding AC ( Eliquis)  -s/p oxybutynin   - pending Ct urogram  -OP cystoscopy   - monitor CBC,  transfuse if Hb <7  - urology following

## 2022-11-11 NOTE — PROGRESS NOTE ADULT - SUBJECTIVE AND OBJECTIVE BOX
Patient is a 89y old  Male who presents with a chief complaint of Hematuria     (11 Nov 2022 08:53)      INTERVAL HPI/OVERNIGHT EVENTS: no overnight events    I&O's Summary    10 Nov 2022 07:01  -  11 Nov 2022 07:00  --------------------------------------------------------  IN: 200 mL / OUT: 2200 mL / NET: -2000 mL      Vital Signs Last 24 Hrs  T(C): 36.6 (11 Nov 2022 05:18), Max: 37.5 (10 Nov 2022 20:21)  T(F): 97.8 (11 Nov 2022 05:18), Max: 99.5 (10 Nov 2022 20:21)  HR: 96 (11 Nov 2022 05:18) (81 - 96)  BP: 124/80 (11 Nov 2022 05:18) (115/73 - 124/80)  BP(mean): --  RR: 18 (11 Nov 2022 05:18) (18 - 18)  SpO2: 95% (11 Nov 2022 05:18) (95% - 98%)    Parameters below as of 11 Nov 2022 05:18  Patient On (Oxygen Delivery Method): room air      PAST MEDICAL & SURGICAL HISTORY:  Hematuria      HTN (hypertension)      Arrhythmia      Visual impairment      Dementia      TERENCE (acute kidney injury)          SOCIAL HISTORY  Alcohol:  Tobacco:  Illicit substance use:      FAMILY HISTORY:      LABS:                        10.9   3.59  )-----------( 213      ( 11 Nov 2022 10:39 )             34.3     11-10    134<L>  |  103  |  25<H>  ----------------------------<  94  4.0   |  26  |  1.30    Ca    8.9      10 Nov 2022 05:59          CAPILLARY BLOOD GLUCOSE                MEDICATIONS  (STANDING):  influenza  Vaccine (HIGH DOSE) 0.7 milliLiter(s) IntraMuscular once  metoprolol tartrate 12.5 milliGRAM(s) Oral two times a day  multivitamin/minerals 1 Tablet(s) Oral daily  polyethylene glycol 3350 17 Gram(s) Oral daily  senna 2 Tablet(s) Oral at bedtime    MEDICATIONS  (PRN):  acetaminophen     Tablet .. 650 milliGRAM(s) Oral every 6 hours PRN Moderate Pain (4 - 6)  acetaminophen  Suppository .. 650 milliGRAM(s) Rectal every 6 hours PRN Temp greater or equal to 38C (100.4F)  OLANZapine 2.5 milliGRAM(s) Oral every 8 hours PRN agitation      REVIEW OF SYSTEMS: limited  EYES: No eye pain  ENMT:  No sinus or throat pain  NECK: No pain   RESPIRATORY: No cough, No shortness of breath  CARDIOVASCULAR: No chest pain  GASTROINTESTINAL: No abdominal pain. No nausea, vomiting, No diarrhea or constipation.   GENITOURINARY: No dysuria, hematuria  NEUROLOGICAL: No headaches  SKIN: No  rashes, or lesions     RADIOLOGY & ADDITIONAL TESTS:    < from: CT Abdomen and Pelvis No Cont (11.03.22 @ 14:30) >    ACC: [id number] EXAM:  CT ABDOMEN AND PELVIS                          PROCEDURE DATE:  11/03/2022          INTERPRETATION:  CLINICAL INFORMATION: 89-year-old male patient with   hematuria    COMPARISON: None.    CONTRAST/COMPLICATIONS:  IV Contrast: NONE  Oral Contrast: NONE  Complications: None reported at time of study completion    PROCEDURE:  CT of the Abdomen and Pelvis was performed.  Sagittal and coronal reformats were performed.    FINDINGS:  LOWER CHEST: Small right-sided pleural effusion and compression   atelectasis. Cardiomegaly. Coronary artery disease.    LIVER: Dilated hepatic veins. Slightly lobular contour of the liver, may   represent fibrosis.  BILE DUCTS: Normal caliber.  GALLBLADDER: Cholelithiasis.  SPLEEN: Within normallimits.  PANCREAS: Mild pancreatic parenchymal atrophy. No ductal dilatation.  ADRENALS: No nodules or masses.  KIDNEYS/URETERS: Bilateral hydronephrosis, moderate left and mild on the   right. Ureters are not abnormally dilated in entire length.    BLADDER: Bladder is decompressed with a Vogel catheter in place.  REPRODUCTIVE ORGANS: Prostate within normal limits.    BOWEL: No bowel obstruction. Appendix is normal. Colonic diverticulosis   without diverticulitis.  PERITONEUM: No ascites.  VESSELS: Abdominal aorta measures 2.9 cm demonstrates moderate   atherosclerotic changes.  RETROPERITONEUM/LYMPH NODES: No lymphadenopathy.  ABDOMINAL WALL: Within normal limits.  BONES: Degenerative changes of bilateral hip joints. Multilevel   discogenic degenerative disease in the lumbar spine, predominantly L2-3   and L5-S1.    IMPRESSION:  Moderate left and mild right-sided hydronephrosis without hydroureter.   Decompressed urinary bladder with a Vogel catheter in place. No   obstructive ureterolithiasis.    Cardiomegaly and right pleural effusion.      --- End of Report ---    NATE TOBIAS MD; Attending Radiologist  This document has been electronically signed. Nov  3 2022  4:28PM    < end of copied text >    ACC: [id number] EXAM:  CT BRAIN                          PROCEDURE DATE:  11/03/2022          INTERPRETATION:  Clinical indication: Confusion.    Multiple axial sections were performed from base of vertex without   contrast enhancement. Coronal and sagittal reconstructions were performed.    Parenchymal volume loss and chronic microvascular ischemic changes are   identified.    There is no acute hemorrhage mass or mass effect seen.    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    Evaluation of the osseous structures with the appropriate window appears   normal    Opacification of the right maxillary sinus is seen which is compatible   with mucosal thickening.    Both mastoid and middle ear regions appear clear.    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    --- End of Report ---      SORAYA EUGENE MD; Attending Radiologist  This document has been electronically signed. Nov  3 2022  2:37PM    ACC: [id number] EXAM:  US KIDNEY(S)                          PROCEDURE DATE:  11/10/2022      INTERPRETATION:  CLINICAL INFORMATION: Follow-up hydronephrosis    COMPARISON: CT abdomen 11/3/2022.    TECHNIQUE: Sonography of the kidneys and bladder.    FINDINGS: Right kidney 11.1 left 11.6 cm long dimension. Bilateral   mild-to-moderate hydronephrosis not significantly changed compared to   prior CT. No renal calculus solid or cystic masses or perirenal   collections.  Bladder collapsed with Vogel      IMPRESSION:  Bilateral mild to moderate hydronephrosis not significantly changed   compared to CT of 11/3/2022        --- End of Report ---    ANNE PRINCE MD; Attending Radiologist  This document has been electronically signed. Nov 10 2022  3:17PM    Imaging Personally Reviewed:  [x ] YES  [ ] NO    Consultant(s) Notes Reviewed:  [x ] YES  [ ] NO    PHYSICAL EXAM:  GENERAL: NAD  HEAD:  Atraumatic, Normocephalic  EYES: conjunctiva and sclera clear  ENMT: Moist mucous membranes  NECK: Supple  NERVOUS SYSTEM:  Alert & Oriented X2-3  CHEST/LUNG: CTA bilaterally; No rales, rhonchi, wheezing  HEART: Regular rate and irregular rhythm  ABDOMEN: Soft, Nontender, Nondistended; Bowel sounds present  EXTREMITIES:  2+ Peripheral Pulses  SKIN: No rashes    Care Collaborated Discussed with Consultants/Other Providers [x ] YES  [ ] NO

## 2022-11-11 NOTE — DIETITIAN INITIAL EVALUATION ADULT - NSFNSPHYEXAMSKINFT_GEN_A_CORE
Pressure Injury 1: sacrum, Stage I  Pressure Injury 2: none, none  Pressure Injury 3: none, none  Pressure Injury 4: none, none  Pressure Injury 5: none, none  Pressure Injury 6: none, none  Pressure Injury 7: none, none  Pressure Injury 8: none, none  Pressure Injury 9: none, none  Pressure Injury 10: none, none  Pressure Injury 11: none, none, Pressure Injury 1: sacrum, Stage I  Pressure Injury 2: none, none  Pressure Injury 3: none, none  Pressure Injury 4: none, none  Pressure Injury 5: none, none  Pressure Injury 6: none, none  Pressure Injury 7: none, none  Pressure Injury 8: none, none  Pressure Injury 9: none, none  Pressure Injury 10: none, none  Pressure Injury 11: none, none Pressure Injury 1: sacrum, Stage I

## 2022-11-11 NOTE — DIETITIAN INITIAL EVALUATION ADULT - PERTINENT LABORATORY DATA
11-10    134<L>  |  103  |  25<H>  ----------------------------<  94  4.0   |  26  |  1.30    Ca    8.9      10 Nov 2022 05:59

## 2022-11-11 NOTE — PROGRESS NOTE ADULT - PROBLEM SELECTOR PLAN 2
p/w gross hematuria associated with urinary retention   -Valencia placed in ED with 2liters U/O  - c/w valencia  - CT urogram  - Urology following

## 2022-11-11 NOTE — PROGRESS NOTE ADULT - PROBLEM SELECTOR PLAN 3
RESOLVED, likely due to obstructive uropathy  -SCr 2.15 on admission  -Ct demonstrated Bilateral hydronephrosis  -Improved SCr post valencia placement and IVF hydration  -SCr 1.17 -->1.30  -Avoid nephrotoxic meds  - nephro following Dr. Altamirano  - plan as above

## 2022-11-11 NOTE — DIETITIAN INITIAL EVALUATION ADULT - SIGNS/SYMPTOMS
26-50% to 51-75% intake at times per observation and flowsheet 26-50% to 51-75% intake at times per observation and flowsheet; debubitis

## 2022-11-11 NOTE — DIETITIAN INITIAL EVALUATION ADULT - NUTRITIONGOAL OUTCOME1
To improve intake >75% of nutrition needs  To improve intake >75% of nutrition needs; To promote wound healing

## 2022-11-12 PROCEDURE — 74178 CT ABD&PLV WO CNTR FLWD CNTR: CPT | Mod: 26

## 2022-11-12 RX ORDER — SODIUM CHLORIDE 9 MG/ML
1000 INJECTION INTRAMUSCULAR; INTRAVENOUS; SUBCUTANEOUS
Refills: 0 | Status: DISCONTINUED | OUTPATIENT
Start: 2022-11-12 | End: 2022-11-13

## 2022-11-12 RX ORDER — POTASSIUM CHLORIDE 20 MEQ
40 PACKET (EA) ORAL EVERY 4 HOURS
Refills: 0 | Status: COMPLETED | OUTPATIENT
Start: 2022-11-12 | End: 2022-11-12

## 2022-11-12 RX ADMIN — Medication 40 MILLIEQUIVALENT(S): at 11:11

## 2022-11-12 RX ADMIN — Medication 12.5 MILLIGRAM(S): at 05:05

## 2022-11-12 RX ADMIN — POLYETHYLENE GLYCOL 3350 17 GRAM(S): 17 POWDER, FOR SOLUTION ORAL at 11:11

## 2022-11-12 RX ADMIN — Medication 650 MILLIGRAM(S): at 02:28

## 2022-11-12 RX ADMIN — SODIUM CHLORIDE 100 MILLILITER(S): 9 INJECTION INTRAMUSCULAR; INTRAVENOUS; SUBCUTANEOUS at 02:29

## 2022-11-12 RX ADMIN — Medication 1 TABLET(S): at 11:11

## 2022-11-12 RX ADMIN — Medication 650 MILLIGRAM(S): at 02:57

## 2022-11-12 RX ADMIN — SENNA PLUS 2 TABLET(S): 8.6 TABLET ORAL at 21:32

## 2022-11-12 RX ADMIN — Medication 40 MILLIEQUIVALENT(S): at 13:22

## 2022-11-12 NOTE — PROGRESS NOTE ADULT - SUBJECTIVE AND OBJECTIVE BOX
Problem List:  Obstructive uropathy, TERENCE    PAST MEDICAL & SURGICAL HISTORY:  Hematuria      HTN (hypertension)      Arrhythmia      Visual impairment      Dementia      TERENCE (acute kidney injury)          No Known Allergies      MEDICATIONS  (STANDING):  influenza  Vaccine (HIGH DOSE) 0.7 milliLiter(s) IntraMuscular once  metoprolol tartrate 12.5 milliGRAM(s) Oral two times a day  multivitamin/minerals 1 Tablet(s) Oral daily  polyethylene glycol 3350 17 Gram(s) Oral daily  senna 2 Tablet(s) Oral at bedtime  sodium chloride 0.9%. 1000 milliLiter(s) (100 mL/Hr) IV Continuous <Continuous>    MEDICATIONS  (PRN):  acetaminophen     Tablet .. 650 milliGRAM(s) Oral every 6 hours PRN Moderate Pain (4 - 6)  acetaminophen  Suppository .. 650 milliGRAM(s) Rectal every 6 hours PRN Temp greater or equal to 38C (100.4F)  OLANZapine 2.5 milliGRAM(s) Oral every 8 hours PRN agitation                            10.9   3.59  )-----------( 213      ( 11 Nov 2022 10:39 )             34.3     11-11    135  |  99  |  7   ----------------------------<  193<H>  3.2<L>   |  29  |  0.61    Ca    8.8      11 Nov 2022 10:39              REVIEW OF SYSTEMS  Dementia    VITALS:  T(F): 97.4 (11-12-22 @ 05:10), Max: 98.1 (11-11-22 @ 13:22)  HR: 78 (11-12-22 @ 05:10)  BP: 113/63 (11-12-22 @ 05:10)  RR: 18 (11-12-22 @ 05:10)  SpO2: 96% (11-12-22 @ 05:10)  Wt(kg): --    11-11 @ 07:01  -  11-12 @ 07:00  --------------------------------------------------------  IN: 0 mL / OUT: 3000 mL / NET: -3000 mL        PHYSICAL EXAM:  Constitutional: well developed, no diaphoresis, no distress.  Neck: No JVD, no carotid bruit, supple, no adenopathy  Respiratory: Good air entrance B/L, no wheezes, rales or rhonchi  Cardiovascular: S1, S2, RRR, no pericardial rub, no murmur  Abdomen: BS+, soft, no tenderness, no bruit  Pelvis: bladder nondistended  Extremities: No cyanosis or clubbing. No peripheral edema.   confused

## 2022-11-12 NOTE — PROGRESS NOTE ADULT - SUBJECTIVE AND OBJECTIVE BOX
INTERVAL HPI/OVERNIGHT EVENTS:  Pt resting comfortably. No overnight events.   Vogel in place.    MEDICATIONS  (STANDING):  influenza  Vaccine (HIGH DOSE) 0.7 milliLiter(s) IntraMuscular once  metoprolol tartrate 12.5 milliGRAM(s) Oral two times a day  multivitamin/minerals 1 Tablet(s) Oral daily  polyethylene glycol 3350 17 Gram(s) Oral daily  potassium chloride   Powder 40 milliEquivalent(s) Oral every 4 hours  senna 2 Tablet(s) Oral at bedtime  sodium chloride 0.9%. 1000 milliLiter(s) (100 mL/Hr) IV Continuous <Continuous>    MEDICATIONS  (PRN):  acetaminophen     Tablet .. 650 milliGRAM(s) Oral every 6 hours PRN Moderate Pain (4 - 6)  acetaminophen  Suppository .. 650 milliGRAM(s) Rectal every 6 hours PRN Temp greater or equal to 38C (100.4F)  OLANZapine 2.5 milliGRAM(s) Oral every 8 hours PRN agitation      Vital Signs Last 24 Hrs  T(C): 36.3 (12 Nov 2022 05:10), Max: 36.7 (11 Nov 2022 13:22)  T(F): 97.4 (12 Nov 2022 05:10), Max: 98.1 (11 Nov 2022 13:22)  HR: 78 (12 Nov 2022 05:10) (78 - 86)  BP: 113/63 (12 Nov 2022 05:10) (113/63 - 124/74)  BP(mean): --  RR: 18 (12 Nov 2022 05:10) (17 - 18)  SpO2: 96% (12 Nov 2022 05:10) (96% - 98%)    Parameters below as of 12 Nov 2022 05:10  Patient On (Oxygen Delivery Method): room air    Physical:  General: NAD.  Abdomen: Soft nondistended, no suprapubic tenderness.    I&O's Detail    11 Nov 2022 07:01  -  12 Nov 2022 07:00  --------------------------------------------------------  IN:  Total IN: 0 mL    OUT:    Indwelling Catheter - Urethral (mL): 3000 mL clear  Total OUT: 3000 mL    Total NET: -3000 mL    LABS:                        10.9   3.59  )-----------( 213      ( 11 Nov 2022 10:39 )             34.3             11-11    135  |  99  |  7   ----------------------------<  193<H>  3.2<L>   |  29  |  0.61    Ca    8.8      11 Nov 2022 10:39

## 2022-11-12 NOTE — PROGRESS NOTE ADULT - ASSESSMENT
TERENCE post renal due to neurogenic versus obstructive uropathy  Macroscopic hematuria with un clear source.  Renal function improved after valencia catheter placement present creatinine 0.62 ?  Hypokalemia follow with KCL supplement  Urology would like to follow with CT urogram , with radiocontrast, agree to the test but initiate iv fluids  cc per hours 3 hours before the procedure and 6 hours after .

## 2022-11-12 NOTE — PROGRESS NOTE ADULT - SUBJECTIVE AND OBJECTIVE BOX
INTERVAL HPI/OVERNIGHT EVENTS:  Patient seen,events noticed  VITAL SIGNS:  T(F): 97.4 (11-12-22 @ 05:10)  HR: 78 (11-12-22 @ 05:10)  BP: 113/63 (11-12-22 @ 05:10)  RR: 18 (11-12-22 @ 05:10)  SpO2: 96% (11-12-22 @ 05:10)  Wt(kg): --    PHYSICAL EXAM:  awake  Constitutional:  Eyes:  ENMT:perrla  Neck:  Respiratory:clear  Cardiovascular:s1s2,m-none  Gastrointestinal:soft,bs pos  Extremities:  Vascular:  Neurological:no focal deficit  Musculoskeletal:    MEDICATIONS  (STANDING):  influenza  Vaccine (HIGH DOSE) 0.7 milliLiter(s) IntraMuscular once  metoprolol tartrate 12.5 milliGRAM(s) Oral two times a day  multivitamin/minerals 1 Tablet(s) Oral daily  polyethylene glycol 3350 17 Gram(s) Oral daily  potassium chloride   Powder 40 milliEquivalent(s) Oral every 4 hours  senna 2 Tablet(s) Oral at bedtime  sodium chloride 0.9%. 1000 milliLiter(s) (100 mL/Hr) IV Continuous <Continuous>    MEDICATIONS  (PRN):  acetaminophen     Tablet .. 650 milliGRAM(s) Oral every 6 hours PRN Moderate Pain (4 - 6)  acetaminophen  Suppository .. 650 milliGRAM(s) Rectal every 6 hours PRN Temp greater or equal to 38C (100.4F)  OLANZapine 2.5 milliGRAM(s) Oral every 8 hours PRN agitation      Allergies    No Known Allergies    Intolerances        LABS:                        10.9   3.59  )-----------( 213      ( 11 Nov 2022 10:39 )             34.3     11-11    135  |  99  |  7   ----------------------------<  193<H>  3.2<L>   |  29  |  0.61    Ca    8.8      11 Nov 2022 10:39            RADIOLOGY & ADDITIONAL TESTS:      · Assessment	  89 yo M w PMH A Fib/Flutter on Eliquis, HTN, dementia, p/w  AMS and acute hematuria x 1 day. Valencia Catheter placed in ED with 2 L of hematuria with minimal clots. pt admitted to medicine for acute hematuria. Ct abd notable for moderate left and mild right hydronephrosis with hydroureter. CBI started, has intermittent clots noted. urology following.   11/8-CBI clamped by uro at 8am. removed 11/9- cbi. s/p renal US to evaluate hydronephrosis, unchanged hydro. urology recc: CT urogram, pending exam for obstructive uropathy. f/u result and uro recc after exam. PT recc KODY ([place]), CM following.     Problem/Plan - 1:  ·  Problem: Bilateral hydronephrosis.   ·  Plan: p/w gross hematuria  -CT A/P shows Moderate left and mild right-sided hydronephrosis without hydroureter.    No obstructive ureterolithiasis.  -renal US hydronephrosis unchanged  - c/w valencia  - Plan: CT urogram-results pending prior to d/c  -Urology following.     Problem/Plan - 2:  ·  Problem: Obstructive uropathy.   ·  Plan: p/w gross hematuria associated with urinary retention   -Valencia placed in ED with 2liters U/O  - c/w valencia  - CT urogram  - Urology following.     Problem/Plan - 3:  ·  Problem: TERENCE (acute kidney injury).   ·  Plan: RESOLVED, likely due to obstructive uropathy  -SCr 2.15 on admission  -Ct demonstrated Bilateral hydronephrosis  -Improved SCr post valencia placement and IVF hydration  -SCr 1.17 -->1.30  -Avoid nephrotoxic meds  - nephro following Dr. Altamirano  - plan as above.     Problem/Plan - 4:  ·  Problem: Hematuria.   ·  Plan: resolved  - Hgb 10.9 stable  -s/p CBI  -H/H stable  -Cont holding AC ( Eliquis)  -s/p oxybutynin   - pending Ct urogram  -OP cystoscopy   - monitor CBC,  transfuse if Hb <7  - urology following.     Problem/Plan - 5:  ·  Problem: HTN (hypertension).   ·  Plan: CONTROLLED  on home metoprolol succ 100 mg + 25 mg daily, 50mg losartan daily  cont metoprolol 12.5 mg w/parameters  hold losartan --> TERENCE  monitor BP.     Problem/Plan - 6:  ·  Problem: Arrhythmia.   ·  Plan: eliquis for AF/Flutter   EKG 11/9/22-aflutter  hold  ELIQUIS for hematuria  f/u when to resume ELIQUIS.     Problem/Plan - 7:  ·  Problem: Constipation.   ·  Plan: continue bowel regimen Miralax and HS Senna.     Problem/Plan - 8:  ·  Problem: Dementia.   ·  Plan: not on medications at home  -olanzapine prn  - supportive measures.     Problem/Plan - 9:  ·  Problem: Prophylactic measure.   ·  Plan: -SCD.     Problem/Plan - 10:  ·  Problem: Discharge planning issues.   ·  Plan; -PT recc [facility] ( [place] [facility])  -Potentially LTC placement--> family unable to care for pt. at home  - pending Ct urogram and urology recc.

## 2022-11-13 LAB
ANION GAP SERPL CALC-SCNC: 7 MMOL/L — SIGNIFICANT CHANGE UP (ref 5–17)
BUN SERPL-MCNC: 22 MG/DL — HIGH (ref 7–18)
CALCIUM SERPL-MCNC: 8.4 MG/DL — SIGNIFICANT CHANGE UP (ref 8.4–10.5)
CHLORIDE SERPL-SCNC: 107 MMOL/L — SIGNIFICANT CHANGE UP (ref 96–108)
CO2 SERPL-SCNC: 23 MMOL/L — SIGNIFICANT CHANGE UP (ref 22–31)
CREAT SERPL-MCNC: 1.33 MG/DL — HIGH (ref 0.5–1.3)
EGFR: 51 ML/MIN/1.73M2 — LOW
GLUCOSE SERPL-MCNC: 82 MG/DL — SIGNIFICANT CHANGE UP (ref 70–99)
MAGNESIUM SERPL-MCNC: 2 MG/DL — SIGNIFICANT CHANGE UP (ref 1.6–2.6)
POTASSIUM SERPL-MCNC: 4.3 MMOL/L — SIGNIFICANT CHANGE UP (ref 3.5–5.3)
POTASSIUM SERPL-SCNC: 4.3 MMOL/L — SIGNIFICANT CHANGE UP (ref 3.5–5.3)
SODIUM SERPL-SCNC: 137 MMOL/L — SIGNIFICANT CHANGE UP (ref 135–145)

## 2022-11-13 RX ADMIN — Medication 1 TABLET(S): at 11:45

## 2022-11-13 RX ADMIN — Medication 12.5 MILLIGRAM(S): at 18:42

## 2022-11-13 RX ADMIN — SENNA PLUS 2 TABLET(S): 8.6 TABLET ORAL at 21:10

## 2022-11-13 RX ADMIN — POLYETHYLENE GLYCOL 3350 17 GRAM(S): 17 POWDER, FOR SOLUTION ORAL at 11:45

## 2022-11-13 NOTE — PROGRESS NOTE ADULT - ASSESSMENT
89y.o. 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

## 2022-11-13 NOTE — PROGRESS NOTE ADULT - SUBJECTIVE AND OBJECTIVE BOX
INTERVAL HPI/OVERNIGHT EVENTS:  Pt resting comfortably. No overnight events.  Vogel in place.   CT urogram reviewed.    MEDICATIONS  (STANDING):  influenza  Vaccine (HIGH DOSE) 0.7 milliLiter(s) IntraMuscular once  metoprolol tartrate 12.5 milliGRAM(s) Oral two times a day  multivitamin/minerals 1 Tablet(s) Oral daily  polyethylene glycol 3350 17 Gram(s) Oral daily  senna 2 Tablet(s) Oral at bedtime  sodium chloride 0.9%. 1000 milliLiter(s) (100 mL/Hr) IV Continuous <Continuous>    MEDICATIONS  (PRN):  acetaminophen     Tablet .. 650 milliGRAM(s) Oral every 6 hours PRN Moderate Pain (4 - 6)  acetaminophen  Suppository .. 650 milliGRAM(s) Rectal every 6 hours PRN Temp greater or equal to 38C (100.4F)  OLANZapine 2.5 milliGRAM(s) Oral every 8 hours PRN agitation    Vital Signs Last 24 Hrs  T(C): 36.5 (13 Nov 2022 04:30), Max: 37.1 (12 Nov 2022 13:20)  T(F): 97.7 (13 Nov 2022 04:30), Max: 98.7 (12 Nov 2022 13:20)  HR: 83 (13 Nov 2022 04:30) (62 - 89)  BP: 109/62 (13 Nov 2022 04:30) (107/82 - 114/69)  BP(mean): --  RR: 17 (13 Nov 2022 04:30) (16 - 17)  SpO2: 96% (13 Nov 2022 04:30) (96% - 99%)    Physical:  General: A&Ox3. NAD.  Abdomen: Soft nondistended, no suprapubic tenderness.  Back: No CVAT b/l    I&O's Detail    12 Nov 2022 07:01  -  13 Nov 2022 07:00  --------------------------------------------------------  IN:  Total IN: 0 mL    OUT:    Indwelling Catheter - Urethral (mL): 1600 mL clear  Total OUT: 1600 mL    Total NET: -1600 mL    LABS:            11-13    137  |  107  |  22<H>  ----------------------------<  82  4.3   |  23  |  1.33<H>    Ca    8.4      13 Nov 2022 05:45  Mg     2.0     11-13    < from: CT Abdomen and Pelvis w/wo IV Cont (11.12.22 @ 18:03) >  IMPRESSION: Mild to moderate bilateral hydroureteronephrosis extending to   the thickened bladder containing multiple diverticula. Recommend   cystoscopy for further dedicated evaluation of the bladder.    < end of copied text >

## 2022-11-13 NOTE — PROGRESS NOTE ADULT - SUBJECTIVE AND OBJECTIVE BOX
INTERVAL HPI/OVERNIGHT EVENTS:  Patient seen,events noticed  VITAL SIGNS:  T(F): 97.7 (11-13-22 @ 04:30)  HR: 83 (11-13-22 @ 04:30)  BP: 109/62 (11-13-22 @ 04:30)  RR: 17 (11-13-22 @ 04:30)  SpO2: 96% (11-13-22 @ 04:30)  Wt(kg): --    PHYSICAL EXAM:  awake  Constitutional:  Eyes:  ENMT:perrla  Neck:  Respiratory:clear  Cardiovascular:s1s2,m-none  Gastrointestinal:soft,bs pos,valencia in place  Extremities:  Vascular:  Neurological:no focal deficit  Musculoskeletal:    MEDICATIONS  (STANDING):  influenza  Vaccine (HIGH DOSE) 0.7 milliLiter(s) IntraMuscular once  metoprolol tartrate 12.5 milliGRAM(s) Oral two times a day  multivitamin/minerals 1 Tablet(s) Oral daily  polyethylene glycol 3350 17 Gram(s) Oral daily  senna 2 Tablet(s) Oral at bedtime  sodium chloride 0.9%. 1000 milliLiter(s) (100 mL/Hr) IV Continuous <Continuous>    MEDICATIONS  (PRN):  acetaminophen     Tablet .. 650 milliGRAM(s) Oral every 6 hours PRN Moderate Pain (4 - 6)  acetaminophen  Suppository .. 650 milliGRAM(s) Rectal every 6 hours PRN Temp greater or equal to 38C (100.4F)  OLANZapine 2.5 milliGRAM(s) Oral every 8 hours PRN agitation      Allergies    No Known Allergies    Intolerances        LABS:                        10.9   3.59  )-----------( 213      ( 11 Nov 2022 10:39 )             34.3     11-13    137  |  107  |  22<H>  ----------------------------<  82  4.3   |  23  |  1.33<H>    Ca    8.4      13 Nov 2022 05:45  Mg     2.0     11-13            RADIOLOGY & ADDITIONAL TESTS:      · Assessment	  89 yo M w PMH A Fib/Flutter on Eliquis, HTN, dementia, p/w  AMS and acute hematuria x 1 day. Valencia Catheter placed in ED with 2 L of hematuria with minimal clots. pt admitted to medicine for acute hematuria. Ct abd notable for moderate left and mild right hydronephrosis with hydroureter. CBI started, has intermittent clots noted. urology following.   11/8-CBI clamped by uro at 8am. removed 11/9- cbi. s/p renal US to evaluate hydronephrosis, unchanged hydro. urology recc: CT urogram, pending exam for obstructive uropathy. f/u result and uro recc after exam. PT recc KODY ([place]), CM following.     Problem/Plan - 1:  ·  Problem: Bilateral hydronephrosis.   ·  Plan: p/w gross hematuria  -CT A/P shows Moderate left and mild right-sided hydronephrosis without hydroureter.    No obstructive ureterolithiasis.  -renal US hydronephrosis unchanged  - c/w valencia  - Plan: CT urogram-results pending prior to d/c  -Urology following.     Problem/Plan - 2:  ·  Problem: Obstructive uropathy.   - c/w valencia  - CT urogram  - Urology following.     Problem/Plan - 3:  ·  Problem: TERENCE (acute kidney injury)-stable clinically  -SCr 2.15 on admission  -Ct demonstrated Bilateral hydronephrosis  -Improved SCr post valencia placement and IVF hydration  -SCr 1.17 -->1.30  -Avoid nephrotoxic meds  - nephro following Dr. Altamirano  - plan as above.     Problem/Plan - 4:  ·  Problem: Hematuria.   ·  Plan: resolved  -H/H stable  -Cont holding AC ( Eliquis)  -s/p oxybutynin   - pending Ct urogram  -OP cystoscopy   - monitor CBC,  transfuse if Hb <7  - urology following.     Problem/Plan - 5:  ·  Problem: HTN (hypertension).   ·  Plan: -cont currentn meds  hold losartan --> TERENCE  monitor BP.     Problem/Plan - 6:  ·  Problem: Arrhythmia.   ·  Plan: eliquis for AF/Flutter   EKG 11/9/22-aflutter  hold  ELIQUIS for hematuria  f/u when to resume ELIQUIS.     Problem/Plan - 7:  ·  Problem: Constipation.   ·  Plan: continue bowel regimen Miralax and HS Senna.     Problem/Plan - 8:  ·  Problem: Dementia.   ·  Plan: not on medications at home  -olanzapine prn  - supportive measures.     Problem/Plan - 9:  ·  Problem: Prophylactic measure.   ·  Plan: -SCD.     Problem/Plan - 10:  ·  Problem: Discharge planning issues.   ·  Plan; -PT recc K[facility] ( [place] [facility])  -Potentially LTC placement--> family unable to care for pt. at home  - pending Ct urogram and urology recc.

## 2022-11-14 LAB
ANION GAP SERPL CALC-SCNC: 8 MMOL/L — SIGNIFICANT CHANGE UP (ref 5–17)
BUN SERPL-MCNC: 17 MG/DL — SIGNIFICANT CHANGE UP (ref 7–18)
CALCIUM SERPL-MCNC: 8.6 MG/DL — SIGNIFICANT CHANGE UP (ref 8.4–10.5)
CHLORIDE SERPL-SCNC: 104 MMOL/L — SIGNIFICANT CHANGE UP (ref 96–108)
CO2 SERPL-SCNC: 23 MMOL/L — SIGNIFICANT CHANGE UP (ref 22–31)
CREAT SERPL-MCNC: 1.33 MG/DL — HIGH (ref 0.5–1.3)
EGFR: 51 ML/MIN/1.73M2 — LOW
GLUCOSE SERPL-MCNC: 93 MG/DL — SIGNIFICANT CHANGE UP (ref 70–99)
POTASSIUM SERPL-MCNC: 4.5 MMOL/L — SIGNIFICANT CHANGE UP (ref 3.5–5.3)
POTASSIUM SERPL-SCNC: 4.5 MMOL/L — SIGNIFICANT CHANGE UP (ref 3.5–5.3)
SARS-COV-2 RNA SPEC QL NAA+PROBE: SIGNIFICANT CHANGE UP
SODIUM SERPL-SCNC: 135 MMOL/L — SIGNIFICANT CHANGE UP (ref 135–145)

## 2022-11-14 RX ORDER — SODIUM CHLORIDE 9 MG/ML
1000 INJECTION INTRAMUSCULAR; INTRAVENOUS; SUBCUTANEOUS
Refills: 0 | Status: DISCONTINUED | OUTPATIENT
Start: 2022-11-14 | End: 2022-11-15

## 2022-11-14 RX ORDER — APIXABAN 2.5 MG/1
5 TABLET, FILM COATED ORAL
Refills: 0 | Status: DISCONTINUED | OUTPATIENT
Start: 2022-11-14 | End: 2022-11-15

## 2022-11-14 RX ORDER — ACETAMINOPHEN 500 MG
2 TABLET ORAL
Qty: 0 | Refills: 0 | DISCHARGE
Start: 2022-11-14

## 2022-11-14 RX ORDER — METOPROLOL TARTRATE 50 MG
12.5 TABLET ORAL
Qty: 0 | Refills: 0 | DISCHARGE
Start: 2022-11-14

## 2022-11-14 RX ORDER — OLANZAPINE 15 MG/1
1 TABLET, FILM COATED ORAL
Qty: 0 | Refills: 0 | DISCHARGE
Start: 2022-11-14

## 2022-11-14 RX ORDER — METOPROLOL TARTRATE 50 MG
1 TABLET ORAL
Qty: 0 | Refills: 0 | DISCHARGE

## 2022-11-14 RX ORDER — SENNA PLUS 8.6 MG/1
2 TABLET ORAL
Qty: 0 | Refills: 0 | DISCHARGE
Start: 2022-11-14

## 2022-11-14 RX ORDER — ACETAMINOPHEN 500 MG
1 TABLET ORAL
Qty: 0 | Refills: 0 | DISCHARGE
Start: 2022-11-14

## 2022-11-14 RX ORDER — POLYETHYLENE GLYCOL 3350 17 G/17G
17 POWDER, FOR SOLUTION ORAL
Qty: 0 | Refills: 0 | DISCHARGE
Start: 2022-11-14

## 2022-11-14 RX ORDER — LOSARTAN POTASSIUM 100 MG/1
1 TABLET, FILM COATED ORAL
Qty: 0 | Refills: 0 | DISCHARGE

## 2022-11-14 RX ADMIN — Medication 1 TABLET(S): at 11:21

## 2022-11-14 RX ADMIN — Medication 12.5 MILLIGRAM(S): at 05:50

## 2022-11-14 RX ADMIN — INFLUENZA VIRUS VACCINE 0.7 MILLILITER(S): 15; 15; 15; 15 SUSPENSION INTRAMUSCULAR at 11:21

## 2022-11-14 RX ADMIN — APIXABAN 5 MILLIGRAM(S): 2.5 TABLET, FILM COATED ORAL at 17:53

## 2022-11-14 RX ADMIN — SODIUM CHLORIDE 75 MILLILITER(S): 9 INJECTION INTRAMUSCULAR; INTRAVENOUS; SUBCUTANEOUS at 18:23

## 2022-11-14 RX ADMIN — SENNA PLUS 2 TABLET(S): 8.6 TABLET ORAL at 22:29

## 2022-11-14 NOTE — PROGRESS NOTE ADULT - PROBLEM SELECTOR PLAN 9
-SCD -now optimized for D/C   -PT recc K[facility] ( [place] [facility])  -pending male bed availability NCM to follow

## 2022-11-14 NOTE — PROGRESS NOTE ADULT - PROBLEM SELECTOR PLAN 3
RESOLVED, likely due to obstructive uropathy  -SCr 2.15 on admission  -Ct demonstrated Bilateral hydronephrosis  -Improved SCr post valencia placement and IVF hydration  -SCr 1.17 -->1.30  -Avoid nephrotoxic meds  - nephro following Dr. Altamirano  - plan as above rate controlled   HR 70-80's   eliquis resumed 11/14   EKG 11/9/22-[place]

## 2022-11-14 NOTE — DISCHARGE NOTE NURSING/CASE MANAGEMENT/SOCIAL WORK - PATIENT PORTAL LINK FT
You can access the FollowMyHealth Patient Portal offered by [facility] by registering at the following website: [email or web address]. By joining [facility]’s FollowMyHealth portal, you will also be able to view your health information using other applications (apps) compatible with our system.

## 2022-11-14 NOTE — PROGRESS NOTE ADULT - PROBLEM SELECTOR PLAN 7
continue bowel regimen Miralax and HS Senna not on medications at home  -olanzapine prn  - supportive measures

## 2022-11-14 NOTE — PROGRESS NOTE ADULT - ASSESSMENT
89 yo M w PMH A Fib/Flutter on Eliquis, HTN, dementia, p/w  AMS and acute hematuria x 1 day. Vogel Catheter placed in ED with 2 L of hematuria with minimal clots. pt admitted to medicine for acute hematuria. Ct abd notable for moderate left and mild right hydronephrosis with hydroureter. CBI started, has intermittent clots noted. urology following.   11/8-CBI clamped by uro at 8am. removed 11/9- cbi. s/p renal US to evaluate hydronephrosis, unchanged hydro. urology recc: CT urogram, pending exam for obstructive uropathy. f/u result and uro recc after exam. PT recc KODY ([place]), CM following. 88 yo M w PMH A Fib/Flutter on Eliquis, HTN, dementia, p/w  AMS and acute hematuria x 1 day. Valencia Catheter placed in ED with 2 L of hematuria with minimal clots. pt admitted to medicine for acute hematuria. Ct abd notable for moderate left and mild right hydronephrosis with hydroureter. CBI started, has intermittent clots noted. urology following.   11/8-CBI clamped by uro at 8am. removed 11/9- cbi. s/p renal US to evaluate hydronephrosis, unchanged hydro CT urogram confirms. Reccomended to continue valencia and have outpatient cystoscopy with urology. PT recc KODY ([place]) Eliquis resumed 11/14 plan to monitor for bleeding PT recevangelist GATES ([place]), CM following pending male bed availability at facility

## 2022-11-14 NOTE — DISCHARGE NOTE NURSING/CASE MANAGEMENT/SOCIAL WORK - NSDCPEFALRISK_GEN_ALL_CORE
For information on Fall & Injury Prevention, visit: [email or web address] OR  [email or web address] OR  https://www.cdc.gov/steadi/patient.html

## 2022-11-14 NOTE — PROGRESS NOTE ADULT - SUBJECTIVE AND OBJECTIVE BOX
Subjective  Pt resting comfortably. No overnight events.  Vogel in place.   CT urogram reviewed yesterday.    Objective    Vital signs  T(F): , Max: 98.8 (11-13-22 @ 21:00)  HR: 79 (11-14-22 @ 05:24)  BP: 122/74 (11-14-22 @ 05:24)  SpO2: 99% (11-14-22 @ 05:24)  Wt(kg): --    Output     OUT:    Indwelling Catheter - Urethral (mL): 1600 mL  Total OUT: 1600 mL    Total NET: -1600 mL      OUT:    Indwelling Catheter - Urethral (mL): 1200 mL  Total OUT: 1200 mL    Total NET: -1200 mL      Physical:  General: A&Ox3. NAD.  Abdomen: Soft nondistended, no suprapubic tenderness.  Back: No CVAT b/l  Labs      11-13 @ 05:45    WBC --    / Hct --    / SCr 1.33

## 2022-11-14 NOTE — PROGRESS NOTE ADULT - PROBLEM SELECTOR PLAN 4
resolved  - Hgb 10.9 stable  -s/p CBI  -H/H stable  -Cont holding AC ( Eliquis)  -s/p oxybutynin   - pending Ct urogram  -OP cystoscopy   - monitor CBC,  transfuse if Hb <7  - urology following controlled   's   cont metoprolol 12.5 mg w/parameters  hold losartan 2/2 TERENCE  monitor BP

## 2022-11-14 NOTE — PROGRESS NOTE ADULT - PROBLEM SELECTOR PLAN 5
CONTROLLED  on home metoprolol succ 100 mg + 25 mg daily, 50mg losartan daily  cont metoprolol 12.5 mg w/parameters  hold losartan --> TERENCE  monitor BP now resolved  - Hgb stable  -s/p CBI  -s/p oxybutynin   -OP cystoscopy   - monitor CBC,  transfuse if Hb <7  - urology following

## 2022-11-14 NOTE — PROGRESS NOTE ADULT - SUBJECTIVE AND OBJECTIVE BOX
Problem List:  TERENCE  Obstructive uropathy      PAST MEDICAL & SURGICAL HISTORY:  Hematuria      HTN (hypertension)      Arrhythmia      Visual impairment      Dementia      TERENCE (acute kidney injury)          No Known Allergies      MEDICATIONS  (STANDING):  apixaban 5 milliGRAM(s) Oral two times a day  metoprolol tartrate 12.5 milliGRAM(s) Oral two times a day  multivitamin/minerals 1 Tablet(s) Oral daily  polyethylene glycol 3350 17 Gram(s) Oral daily  senna 2 Tablet(s) Oral at bedtime  sodium chloride 0.9%. 1000 milliLiter(s) (75 mL/Hr) IV Continuous <Continuous>    MEDICATIONS  (PRN):  acetaminophen     Tablet .. 650 milliGRAM(s) Oral every 6 hours PRN Moderate Pain (4 - 6)  acetaminophen  Suppository .. 650 milliGRAM(s) Rectal every 6 hours PRN Temp greater or equal to 38C (100.4F)  OLANZapine 2.5 milliGRAM(s) Oral every 8 hours PRN agitation        11-14    135  |  104  |  17  ----------------------------<  93  4.5   |  23  |  1.33<H>    Ca    8.6      14 Nov 2022 12:15  Mg     2.0     11-13              REVIEW OF SYSTEMS:  General: no fever no chills, no weight loss.    RESPIRATORY: No cough, wheezing, hemoptysis; No shortness of breath  CARDIOVASCULAR: No chest pain or palpitations. No Edema  GASTROINTESTINAL: No abdominal or epigastric pain. No nausea, vomiting. No diarrhea or constipation. No melena.  GENITOURINARY: No dysuria, frequency, foamy urine, urinary urgency, incontinence or hematuria  NEUROLOGICAL: No numbness or weakness, no tremor , no dizziness.   Muscle skeletal : no joint pain and no swelling of joints and limbs.  SKIN: No itching, burning, rashes.        VITALS:  T(F): 97.6 (11-14-22 @ 13:22), Max: 98.8 (11-13-22 @ 21:00)  HR: 95 (11-14-22 @ 13:22)  BP: 124/70 (11-14-22 @ 13:22)  RR: 18 (11-14-22 @ 13:22)  SpO2: 99% (11-14-22 @ 13:22)  Wt(kg): --    11-13 @ 07:01  -  11-14 @ 07:00  --------------------------------------------------------  IN: 0 mL / OUT: 1200 mL / NET: -1200 mL        PHYSICAL EXAM:  Constitutional: well developed, no diaphoresis, no distress.  Neck: No JVD, no carotid bruit, supple, no adenopathy  Respiratory:  air entrance B/L, no wheezes, rales or rhonchi  Cardiovascular: S1, S2, RRR, no pericardial rub, no murmur  Abdomen: BS+, soft, no tenderness, no bruit  Pelvis: bladder nondistended  Extremities: No cyanosis or clubbing. No peripheral edema.   dementia

## 2022-11-14 NOTE — PROGRESS NOTE ADULT - ATTENDING COMMENTS
d/w patient and staff plan of care
d/w staff plan of care
d/w patient and staff
d/w staff plan of care

## 2022-11-14 NOTE — PROGRESS NOTE ADULT - PROBLEM SELECTOR PLAN 2
p/w gross hematuria associated with urinary retention   -Valencia placed in ED with 2liters U/O  - c/w valencia  - CT urogram  - Urology following RESOLVED, likely due to obstructive uropathy  -SCr 2.15 on admission  -Ct demonstrated Bilateral hydronephrosis  -Improved SCr post valencia placement and IVF hydration  -SCr 1.17 -->1.30  -Avoid nephrotoxic meds  - nephro following Dr. Altamirano  - plan as above

## 2022-11-14 NOTE — PROGRESS NOTE ADULT - PROBLEM SELECTOR PLAN 1
p/w gross hematuria  -CT A/P shows Moderate left and mild right-sided hydronephrosis without hydroureter.    No obstructive ureterolithiasis.  -renal US hydronephrosis unchanged  - c/w valencia  - Plan: CT urogram  - TOV when clinically indicated  -OP cystoscopy   -Urology following p/w gross hematuria  -CT A/P shows Moderate left and mild right-sided hydronephrosis without hydroureter.    No obstructive ureterolithiasis.  -renal US hydronephrosis unchanged  - CT urogram confirms   - c/w valencia  -OP cystoscopy   -Urology following

## 2022-11-14 NOTE — PROGRESS NOTE ADULT - SUBJECTIVE AND OBJECTIVE BOX
-*-*-* INCOMPLETE  NP Note discussed with  primary attending    Patient is a 89y old  Male who presents with a chief complaint of Hematuria (14 Nov 2022 10:02)      INTERVAL HPI/OVERNIGHT EVENTS: no acute medical events     MEDICATIONS  (STANDING):  apixaban 5 milliGRAM(s) Oral two times a day  metoprolol tartrate 12.5 milliGRAM(s) Oral two times a day  multivitamin/minerals 1 Tablet(s) Oral daily  polyethylene glycol 3350 17 Gram(s) Oral daily  senna 2 Tablet(s) Oral at bedtime  sodium chloride 0.9%. 1000 milliLiter(s) (75 mL/Hr) IV Continuous <Continuous>    MEDICATIONS  (PRN):  acetaminophen     Tablet .. 650 milliGRAM(s) Oral every 6 hours PRN Moderate Pain (4 - 6)  acetaminophen  Suppository .. 650 milliGRAM(s) Rectal every 6 hours PRN Temp greater or equal to 38C (100.4F)  OLANZapine 2.5 milliGRAM(s) Oral every 8 hours PRN agitation      __________________________________________________  REVIEW OF SYSTEMS:    CONSTITUTIONAL: No fever,   EYES: no acute visual disturbances  NECK: No pain or stiffness  RESPIRATORY: No cough; No shortness of breath  CARDIOVASCULAR: No chest pain, no palpitations  GASTROINTESTINAL: No pain. No nausea or vomiting; No diarrhea   NEUROLOGICAL: No headache or numbness, no tremors  MUSCULOSKELETAL: No joint pain, no muscle pain  GENITOURINARY: no dysuria, no frequency, no hesitancy  PSYCHIATRY: no depression , no anxiety  ALL OTHER  ROS negative        Vital Signs Last 24 Hrs  T(C): 36.4 (14 Nov 2022 13:22), Max: 37.1 (13 Nov 2022 21:00)  T(F): 97.6 (14 Nov 2022 13:22), Max: 98.8 (13 Nov 2022 21:00)  HR: 95 (14 Nov 2022 13:22) (79 - 99)  BP: 124/70 (14 Nov 2022 13:22) (122/74 - 138/87)  BP(mean): --  RR: 18 (14 Nov 2022 13:22) (17 - 18)  SpO2: 99% (14 Nov 2022 13:22) (98% - 99%)    Parameters below as of 14 Nov 2022 13:22  Patient On (Oxygen Delivery Method): room air        ________________________________________________  PHYSICAL EXAM:  GENERAL: chronically ill appearing   HEENT: Normocephalic;  conjunctivae and sclerae clear;  NECK : supple  CHEST/LUNG: Clear to auscultation bilaterally with good air entry   HEART: S1 S2  regular; no murmurs, gallops or rubs  ABDOMEN: Soft, Nontender, Nondistended; Bowel sounds present  : +valencia with clear yellow urine   EXTREMITIES: no cyanosis; no edema; no calf tenderness  SKIN: warm and dry; no rash  NERVOUS SYSTEM: a&ox2   _________________________________________________  LABS:    11-14    135  |  104  |  17  ----------------------------<  93  4.5   |  23  |  1.33<H>    Ca    8.6      14 Nov 2022 12:15  Mg     2.0     11-13          CAPILLARY BLOOD GLUCOSE             RADIOLOGY & ADDITIONAL TESTS:   < from: CT Abdomen and Pelvis w/wo IV Cont (11.12.22 @ 18:03) >  IMPRESSION: Mild to moderate bilateral hydroureteronephrosis extending to   the thickened bladder containing multiple diverticula. Recommend   cystoscopy for further dedicated evaluation of the bladder.    < end of copied text >  < from: US Renal (11.10.22 @ 11:45) >  IMPRESSION:  Bilateral mild to moderate hydronephrosis not significantly changed   compared to CT of 11/3/2022        --- End of Report ---        < end of copied text >    < from: CT Head No Cont (11.03.22 @ 14:30) >  Impression: Dolichoectatic changes are seen involving both distal   vertebral and basilar arteries.    --- End of Report ---    < end of copied text >  < from: CT Abdomen and Pelvis No Cont (11.03.22 @ 14:30) >    IMPRESSION:  Moderate left and mild right-sided hydronephrosis without hydroureter.   Decompressed urinary bladder with a Valencia catheter in place. No   obstructive ureterolithiasis.    Cardiomegaly and right pleural effusion.      --- End of Report ---    < end of copied text >  Imaging Personally Reviewed:  YES    Consultant(s) Notes Reviewed:   YES    Plan of care was discussed with patient and /or primary care giver; all questions and concerns were addressed and care was aligned with patient's wishes.

## 2022-11-14 NOTE — PROGRESS NOTE ADULT - REASON FOR ADMISSION
hematuria
uti/hematuria
hematuria
hematuria/hydronephrosis
hematuria/hydronephrosis
hematuria
hematuria/hydronephrosis
hematuria/hydronephrosis

## 2022-11-14 NOTE — PROGRESS NOTE ADULT - ASSESSMENT
TERENCE post renal due to neurogenic versus obstructive uropathy.  Macroscopic hematuria with un clear source resolved , patient was on Eliquis . AS per urology can start Eliquis     Post CT with radiocontrast , revealing mild right pleural  effusion and atelectasis,  , cirrhotic liver, mild to moderate hydronephrosis. IV fluids was DC .   As per urology continue Valencia catheter.     Renal function improved after valencia catheter placement present creatinine 1.33

## 2022-11-14 NOTE — PROGRESS NOTE ADULT - PROBLEM SELECTOR PLAN 6
eliquis for AF/Flutter   EKG 11/9/22-aflutter  hold  ELIQUIS for hematuria  f/u when to resume ELIQUIS continue bowel regimen Miralax and HS Senna

## 2022-11-14 NOTE — PROGRESS NOTE ADULT - PROBLEM SELECTOR PLAN 10
-PT recc K[facility]  -Potentially LTC placement--> family unable to care for pt. at home
-PT recc [facility]  -Potentially LTC placement--> family unable to care for pt. at home  - renal US result and urology recc
-PT recc [facility] ( [facility])  -Potentially LTC placement--> family unable to care for pt. at home  - pending Ct urogram and urology recc
-PT recc [facility] ( [place] [facility])  -Potentially LTC placement--> family unable to care for pt. at home  - pending Ct urogram and urology recc
-pt lives at home with wife  -Pt.'s wife and daughter updated re pt.'s condition and plan of care  -PT recc [facility]  -Potentially LTC placement

## 2022-11-15 VITALS — SYSTOLIC BLOOD PRESSURE: 114 MMHG | DIASTOLIC BLOOD PRESSURE: 68 MMHG | HEART RATE: 79 BPM | OXYGEN SATURATION: 98 %

## 2022-11-15 LAB
ANION GAP SERPL CALC-SCNC: 9 MMOL/L — SIGNIFICANT CHANGE UP (ref 5–17)
BUN SERPL-MCNC: 17 MG/DL — SIGNIFICANT CHANGE UP (ref 7–18)
CALCIUM SERPL-MCNC: 8.8 MG/DL — SIGNIFICANT CHANGE UP (ref 8.4–10.5)
CHLORIDE SERPL-SCNC: 105 MMOL/L — SIGNIFICANT CHANGE UP (ref 96–108)
CO2 SERPL-SCNC: 23 MMOL/L — SIGNIFICANT CHANGE UP (ref 22–31)
CREAT SERPL-MCNC: 1.38 MG/DL — HIGH (ref 0.5–1.3)
EGFR: 49 ML/MIN/1.73M2 — LOW
GLUCOSE SERPL-MCNC: 78 MG/DL — SIGNIFICANT CHANGE UP (ref 70–99)
HCT VFR BLD CALC: 32.7 % — LOW (ref 39–50)
HGB BLD-MCNC: 10.4 G/DL — LOW (ref 13–17)
MCHC RBC-ENTMCNC: 28.8 PG — SIGNIFICANT CHANGE UP (ref 27–34)
MCHC RBC-ENTMCNC: 31.8 GM/DL — LOW (ref 32–36)
MCV RBC AUTO: 90.6 FL — SIGNIFICANT CHANGE UP (ref 80–100)
NRBC # BLD: 0 /100 WBCS — SIGNIFICANT CHANGE UP (ref 0–0)
PLATELET # BLD AUTO: 406 K/UL — HIGH (ref 150–400)
POTASSIUM SERPL-MCNC: 4.2 MMOL/L — SIGNIFICANT CHANGE UP (ref 3.5–5.3)
POTASSIUM SERPL-SCNC: 4.2 MMOL/L — SIGNIFICANT CHANGE UP (ref 3.5–5.3)
RBC # BLD: 3.61 M/UL — LOW (ref 4.2–5.8)
RBC # FLD: 13.5 % — SIGNIFICANT CHANGE UP (ref 10.3–14.5)
SODIUM SERPL-SCNC: 137 MMOL/L — SIGNIFICANT CHANGE UP (ref 135–145)
WBC # BLD: 8.51 K/UL — SIGNIFICANT CHANGE UP (ref 3.8–10.5)
WBC # FLD AUTO: 8.51 K/UL — SIGNIFICANT CHANGE UP (ref 3.8–10.5)

## 2022-11-15 PROCEDURE — 86850 RBC ANTIBODY SCREEN: CPT

## 2022-11-15 PROCEDURE — 87641 MR-STAPH DNA AMP PROBE: CPT

## 2022-11-15 PROCEDURE — 87040 BLOOD CULTURE FOR BACTERIA: CPT

## 2022-11-15 PROCEDURE — 90662 IIV NO PRSV INCREASED AG IM: CPT

## 2022-11-15 PROCEDURE — 86901 BLOOD TYPING SEROLOGIC RH(D): CPT

## 2022-11-15 PROCEDURE — 83735 ASSAY OF MAGNESIUM: CPT

## 2022-11-15 PROCEDURE — 80053 COMPREHEN METABOLIC PANEL: CPT

## 2022-11-15 PROCEDURE — 80048 BASIC METABOLIC PNL TOTAL CA: CPT

## 2022-11-15 PROCEDURE — 85025 COMPLETE CBC W/AUTO DIFF WBC: CPT

## 2022-11-15 PROCEDURE — 97530 THERAPEUTIC ACTIVITIES: CPT

## 2022-11-15 PROCEDURE — 70450 CT HEAD/BRAIN W/O DYE: CPT | Mod: MA

## 2022-11-15 PROCEDURE — 84100 ASSAY OF PHOSPHORUS: CPT

## 2022-11-15 PROCEDURE — 86900 BLOOD TYPING SEROLOGIC ABO: CPT

## 2022-11-15 PROCEDURE — 76775 US EXAM ABDO BACK WALL LIM: CPT

## 2022-11-15 PROCEDURE — 85730 THROMBOPLASTIN TIME PARTIAL: CPT

## 2022-11-15 PROCEDURE — 82550 ASSAY OF CK (CPK): CPT

## 2022-11-15 PROCEDURE — 85610 PROTHROMBIN TIME: CPT

## 2022-11-15 PROCEDURE — 36415 COLL VENOUS BLD VENIPUNCTURE: CPT

## 2022-11-15 PROCEDURE — 74176 CT ABD & PELVIS W/O CONTRAST: CPT | Mod: MA

## 2022-11-15 PROCEDURE — 99285 EMERGENCY DEPT VISIT HI MDM: CPT

## 2022-11-15 PROCEDURE — 74178 CT ABD&PLV WO CNTR FLWD CNTR: CPT

## 2022-11-15 PROCEDURE — 97163 PT EVAL HIGH COMPLEX 45 MIN: CPT

## 2022-11-15 PROCEDURE — 97110 THERAPEUTIC EXERCISES: CPT

## 2022-11-15 PROCEDURE — 87640 STAPH A DNA AMP PROBE: CPT

## 2022-11-15 PROCEDURE — 93005 ELECTROCARDIOGRAM TRACING: CPT

## 2022-11-15 PROCEDURE — 87086 URINE CULTURE/COLONY COUNT: CPT

## 2022-11-15 PROCEDURE — 87635 SARS-COV-2 COVID-19 AMP PRB: CPT

## 2022-11-15 PROCEDURE — 81001 URINALYSIS AUTO W/SCOPE: CPT

## 2022-11-15 PROCEDURE — 85027 COMPLETE CBC AUTOMATED: CPT

## 2022-11-15 RX ADMIN — SODIUM CHLORIDE 75 MILLILITER(S): 9 INJECTION INTRAMUSCULAR; INTRAVENOUS; SUBCUTANEOUS at 05:54

## 2022-11-15 RX ADMIN — POLYETHYLENE GLYCOL 3350 17 GRAM(S): 17 POWDER, FOR SOLUTION ORAL at 11:18

## 2022-11-15 RX ADMIN — Medication 12.5 MILLIGRAM(S): at 05:50

## 2022-11-15 RX ADMIN — Medication 1 TABLET(S): at 11:18

## 2022-11-15 RX ADMIN — APIXABAN 5 MILLIGRAM(S): 2.5 TABLET, FILM COATED ORAL at 05:51

## 2022-11-15 NOTE — PROGRESS NOTE ADULT - SUBJECTIVE AND OBJECTIVE BOX
Subjective  Pt resting comfortably. No overnight events.  Vogel in place.     Objective    Vital signs  T(F): , Max: 98.2 (11-14-22 @ 21:11)  HR: 86 (11-15-22 @ 05:21)  BP: 114/73 (11-15-22 @ 05:21)  SpO2: 98% (11-15-22 @ 05:21)  Wt(kg): --    Output     OUT:    Indwelling Catheter - Urethral (mL): 1200 mL  Total OUT: 1200 mL    Total NET: -1200 mL      Physical:  General: A&Ox3. NAD.  Abdomen: Soft nondistended, no suprapubic tenderness.  Back: No CVAT b/l. Vogel draining CYU    Labs      11-15 @ 05:40    WBC 8.51  / Hct 32.7  / SCr 1.38     11-14 @ 12:15    WBC --    / Hct --    / SCr 1.33           Urine Cx: ?  Blood Cx: ?    Imaging

## 2022-11-15 NOTE — PROGRESS NOTE ADULT - PROVIDER SPECIALTY LIST ADULT
Internal Medicine
Nephrology
Nephrology
Urology
Internal Medicine
Nephrology
Urology
Internal Medicine
Nephrology
Nephrology
Urology
Urology
Internal Medicine

## 2022-11-15 NOTE — PROGRESS NOTE ADULT - ASSESSMENT
TERENCE post renal due to neurogenic versus obstructive uropathy.  Macroscopic hematuria with un clear source resolved , patient was on Eliquis . AS per urology can start Eliquis     Post CT with radiocontrast , revealing mild right pleural  effusion and atelectasis,  , cirrhotic liver, mild to moderate hydronephrosis. IV fluids was DC .   As per urology continue Valencia catheter.     Renal function improved after valencia catheter placement present creatinine 1.38.

## 2022-11-15 NOTE — PROGRESS NOTE ADULT - SUBJECTIVE AND OBJECTIVE BOX
Problem List:  TERENCE , obstructive uropathy    PAST MEDICAL & SURGICAL HISTORY:  Hematuria      HTN (hypertension)      Arrhythmia      Visual impairment      Dementia      TERENCE (acute kidney injury)          No Known Allergies      MEDICATIONS  (STANDING):  apixaban 5 milliGRAM(s) Oral two times a day  metoprolol tartrate 12.5 milliGRAM(s) Oral two times a day  multivitamin/minerals 1 Tablet(s) Oral daily  polyethylene glycol 3350 17 Gram(s) Oral daily  senna 2 Tablet(s) Oral at bedtime  sodium chloride 0.9%. 1000 milliLiter(s) (75 mL/Hr) IV Continuous <Continuous>    MEDICATIONS  (PRN):  acetaminophen     Tablet .. 650 milliGRAM(s) Oral every 6 hours PRN Moderate Pain (4 - 6)  acetaminophen  Suppository .. 650 milliGRAM(s) Rectal every 6 hours PRN Temp greater or equal to 38C (100.4F)  OLANZapine 2.5 milliGRAM(s) Oral every 8 hours PRN agitation                            10.4   8.51  )-----------( 406      ( 15 Nov 2022 05:40 )             32.7     11-15    137  |  105  |  17  ----------------------------<  78  4.2   |  23  |  1.38<H>    Ca    8.8      15 Nov 2022 05:40              REVIEW OF SYSTEMS:  dementia        VITALS:  T(F): 97.5 (11-15-22 @ 05:21), Max: 98.2 (11-14-22 @ 21:11)  HR: 86 (11-15-22 @ 05:21)  BP: 114/73 (11-15-22 @ 05:21)  RR: 18 (11-15-22 @ 05:21)  SpO2: 98% (11-15-22 @ 05:21)  Wt(kg): --    11-14 @ 07:01  -  11-15 @ 07:00  --------------------------------------------------------  IN: 0 mL / OUT: 1200 mL / NET: -1200 mL        PHYSICAL EXAM:  Constitutional:  no diaphoresis, no distress.  Neck: No JVD, no carotid bruit, supple, no adenopathy  Respiratory: Good air entrance B/L, no wheezes, rales or rhonchi  Cardiovascular: S1, S2, RRR, no pericardial rub, no murmur  Abdomen: BS+, soft, no tenderness, no bruit  Pelvis: bladder nondistended  Extremities: No cyanosis or clubbing. No peripheral edema.   confused

## 2023-02-21 NOTE — PROGRESS NOTE ADULT - PROBLEM SELECTOR PLAN 9
-SCD Arava Counseling:  Patient counseled regarding adverse effects of Arava including but not limited to nausea, vomiting, abnormalities in liver function tests. Patients may develop mouth sores, rash, diarrhea, and abnormalities in blood counts. The patient understands that monitoring is required including LFTs and blood counts.  There is a rare possibility of scarring of the liver and lung problems that can occur when taking methotrexate. Persistent nausea, loss of appetite, pale stools, dark urine, cough, and shortness of breath should be reported immediately. Patient advised to discontinue Arava treatment and consult with a physician prior to attempting conception. The patient will have to undergo a treatment to eliminate Arava from the body prior to conception.

## 2023-05-18 NOTE — PROGRESS NOTE PEDS - SUBJECTIVE AND OBJECTIVE BOX
I called patient to discuss lumbar spine MRI results    Lumbar spine MRI on May 17, 2023 by Dr. Adorno    IMPRESSION:   1. There are mild-to-moderate degenerative changes of the lumbar spine. No  significant spinal canal stenosis.  2. Mild to moderate neural foraminal narrowing at multiple levels.  3. There is minimal change from the previous exam.     Assessment/plan:  Patient reports interval relief of some of her symptoms although there is persistent weakness.  I explained to the patient that I do not see significant nerve impingement on the MRI.  For the time being, we will continue with prior recommendations of following up with pain management for possible epidural steroid injections for additional pain relief.  If symptoms of weakness persist may consider a EMG/nerve conduction study and evaluation by spine surgeon.  I will defer additional gabapentin medication to pain management specialist as needed.  
INTERVAL HPI/OVERNIGHT EVENTS:  Pt resting comfortably. No acute complaints    MEDICATIONS  (STANDING):  influenza  Vaccine (HIGH DOSE) 0.7 milliLiter(s) IntraMuscular once  metoprolol tartrate 12.5 milliGRAM(s) Oral two times a day  multivitamin/minerals 1 Tablet(s) Oral daily  polyethylene glycol 3350 17 Gram(s) Oral daily  senna 2 Tablet(s) Oral at bedtime    MEDICATIONS  (PRN):  acetaminophen     Tablet .. 650 milliGRAM(s) Oral every 6 hours PRN Moderate Pain (4 - 6)  acetaminophen  Suppository .. 650 milliGRAM(s) Rectal every 6 hours PRN Temp greater or equal to 38C (100.4F)  OLANZapine 2.5 milliGRAM(s) Oral every 8 hours PRN agitation      Vital Signs Last 24 Hrs  T(C): 36.8 (10 Nov 2022 05:17), Max: 36.9 (09 Nov 2022 21:20)  T(F): 98.3 (10 Nov 2022 05:17), Max: 98.4 (09 Nov 2022 21:20)  HR: 81 (10 Nov 2022 12:38) (81 - 93)  BP: 118/70 (10 Nov 2022 12:38) (113/68 - 118/70)  BP(mean): --  RR: 18 (10 Nov 2022 05:17) (17 - 18)  SpO2: 98% (10 Nov 2022 12:38) (95% - 98%)    Parameters below as of 10 Nov 2022 12:38  Patient On (Oxygen Delivery Method): room air        Physical:  General: NAD  Respirations: unlabored  Abdomen: Soft nondistended, no suprapubic tenderness.  Gu: valencia catheter in place, draining clear yellow urine    I&O's Detail    09 Nov 2022 07:01  -  10 Nov 2022 07:00  --------------------------------------------------------  IN:    Lactated Ringers: 935 mL    Oral Fluid: 240 mL  Total IN: 1175 mL    OUT:    Indwelling Catheter - Urethral (mL): 1650 mL  Total OUT: 1650 mL    Total NET: -475 mL          LABS:                        10.0   9.78  )-----------( 295      ( 10 Nov 2022 05:59 )             30.5             11-10    134<L>  |  103  |  25<H>  ----------------------------<  94  4.0   |  26  |  1.30    Ca    8.9      10 Nov 2022 05:59

## 2023-06-05 ENCOUNTER — INPATIENT (INPATIENT)
Facility: HOSPITAL | Age: 88
LOS: 10 days | Discharge: HOSPICE | DRG: 698 | End: 2023-06-16
Attending: STUDENT IN AN ORGANIZED HEALTH CARE EDUCATION/TRAINING PROGRAM | Admitting: STUDENT IN AN ORGANIZED HEALTH CARE EDUCATION/TRAINING PROGRAM
Payer: MEDICARE

## 2023-06-05 VITALS
RESPIRATION RATE: 18 BRPM | WEIGHT: 160.06 LBS | HEIGHT: 75 IN | HEART RATE: 100 BPM | DIASTOLIC BLOOD PRESSURE: 64 MMHG | SYSTOLIC BLOOD PRESSURE: 99 MMHG | TEMPERATURE: 98 F | OXYGEN SATURATION: 95 %

## 2023-06-05 DIAGNOSIS — N17.9 ACUTE KIDNEY FAILURE, UNSPECIFIED: Chronic | ICD-10-CM

## 2023-06-05 LAB
ALBUMIN SERPL ELPH-MCNC: 2.5 G/DL — LOW (ref 3.5–5)
ALP SERPL-CCNC: 118 U/L — SIGNIFICANT CHANGE UP (ref 40–120)
ALT FLD-CCNC: 24 U/L DA — SIGNIFICANT CHANGE UP (ref 10–60)
ANION GAP SERPL CALC-SCNC: 12 MMOL/L — SIGNIFICANT CHANGE UP (ref 5–17)
APPEARANCE UR: ABNORMAL
AST SERPL-CCNC: 36 U/L — SIGNIFICANT CHANGE UP (ref 10–40)
BACTERIA # UR AUTO: ABNORMAL /HPF
BASOPHILS # BLD AUTO: 0.03 K/UL — SIGNIFICANT CHANGE UP (ref 0–0.2)
BASOPHILS NFR BLD AUTO: 0.2 % — SIGNIFICANT CHANGE UP (ref 0–2)
BILIRUB SERPL-MCNC: 0.8 MG/DL — SIGNIFICANT CHANGE UP (ref 0.2–1.2)
BILIRUB UR-MCNC: NEGATIVE — SIGNIFICANT CHANGE UP
BUN SERPL-MCNC: 108 MG/DL — HIGH (ref 7–18)
CALCIUM SERPL-MCNC: 9.4 MG/DL — SIGNIFICANT CHANGE UP (ref 8.4–10.5)
CHLORIDE SERPL-SCNC: 99 MMOL/L — SIGNIFICANT CHANGE UP (ref 96–108)
CO2 SERPL-SCNC: 18 MMOL/L — LOW (ref 22–31)
COLOR SPEC: YELLOW — SIGNIFICANT CHANGE UP
CREAT SERPL-MCNC: 5.89 MG/DL — HIGH (ref 0.5–1.3)
DIFF PNL FLD: ABNORMAL
EGFR: 9 ML/MIN/1.73M2 — LOW
EOSINOPHIL # BLD AUTO: 0 K/UL — SIGNIFICANT CHANGE UP (ref 0–0.5)
EOSINOPHIL NFR BLD AUTO: 0 % — SIGNIFICANT CHANGE UP (ref 0–6)
EPI CELLS # UR: SIGNIFICANT CHANGE UP /HPF
GLUCOSE SERPL-MCNC: 126 MG/DL — HIGH (ref 70–99)
GLUCOSE UR QL: NEGATIVE — SIGNIFICANT CHANGE UP
HCT VFR BLD CALC: 32.3 % — LOW (ref 39–50)
HGB BLD-MCNC: 10.5 G/DL — LOW (ref 13–17)
IMM GRANULOCYTES NFR BLD AUTO: 0.7 % — SIGNIFICANT CHANGE UP (ref 0–0.9)
KETONES UR-MCNC: NEGATIVE — SIGNIFICANT CHANGE UP
LEUKOCYTE ESTERASE UR-ACNC: ABNORMAL
LIDOCAIN IGE QN: 71 U/L — LOW (ref 73–393)
LYMPHOCYTES # BLD AUTO: 0.59 K/UL — LOW (ref 1–3.3)
LYMPHOCYTES # BLD AUTO: 3.7 % — LOW (ref 13–44)
MCHC RBC-ENTMCNC: 25.7 PG — LOW (ref 27–34)
MCHC RBC-ENTMCNC: 32.5 GM/DL — SIGNIFICANT CHANGE UP (ref 32–36)
MCV RBC AUTO: 79.2 FL — LOW (ref 80–100)
MONOCYTES # BLD AUTO: 1.21 K/UL — HIGH (ref 0–0.9)
MONOCYTES NFR BLD AUTO: 7.7 % — SIGNIFICANT CHANGE UP (ref 2–14)
NEUTROPHILS # BLD AUTO: 13.83 K/UL — HIGH (ref 1.8–7.4)
NEUTROPHILS NFR BLD AUTO: 87.7 % — HIGH (ref 43–77)
NITRITE UR-MCNC: NEGATIVE — SIGNIFICANT CHANGE UP
NRBC # BLD: 0 /100 WBCS — SIGNIFICANT CHANGE UP (ref 0–0)
PH UR: 8 — SIGNIFICANT CHANGE UP (ref 5–8)
PLATELET # BLD AUTO: 273 K/UL — SIGNIFICANT CHANGE UP (ref 150–400)
POTASSIUM SERPL-MCNC: 5.3 MMOL/L — SIGNIFICANT CHANGE UP (ref 3.5–5.3)
POTASSIUM SERPL-SCNC: 5.3 MMOL/L — SIGNIFICANT CHANGE UP (ref 3.5–5.3)
PROT SERPL-MCNC: 8 G/DL — SIGNIFICANT CHANGE UP (ref 6–8.3)
PROT UR-MCNC: 100 MG/DL
RBC # BLD: 4.08 M/UL — LOW (ref 4.2–5.8)
RBC # FLD: 16.9 % — HIGH (ref 10.3–14.5)
RBC CASTS # UR COMP ASSIST: ABNORMAL /HPF (ref 0–2)
SODIUM SERPL-SCNC: 129 MMOL/L — LOW (ref 135–145)
SP GR SPEC: 1.01 — SIGNIFICANT CHANGE UP (ref 1.01–1.02)
UROBILINOGEN FLD QL: NEGATIVE — SIGNIFICANT CHANGE UP
WBC # BLD: 15.77 K/UL — HIGH (ref 3.8–10.5)
WBC # FLD AUTO: 15.77 K/UL — HIGH (ref 3.8–10.5)
WBC UR QL: ABNORMAL /HPF (ref 0–5)

## 2023-06-05 PROCEDURE — 99285 EMERGENCY DEPT VISIT HI MDM: CPT

## 2023-06-05 RX ORDER — CEFTRIAXONE 500 MG/1
2000 INJECTION, POWDER, FOR SOLUTION INTRAMUSCULAR; INTRAVENOUS ONCE
Refills: 0 | Status: COMPLETED | OUTPATIENT
Start: 2023-06-05 | End: 2023-06-05

## 2023-06-05 RX ORDER — ACETAMINOPHEN 500 MG
650 TABLET ORAL ONCE
Refills: 0 | Status: COMPLETED | OUTPATIENT
Start: 2023-06-05 | End: 2023-06-05

## 2023-06-05 RX ORDER — SODIUM CHLORIDE 9 MG/ML
500 INJECTION INTRAMUSCULAR; INTRAVENOUS; SUBCUTANEOUS ONCE
Refills: 0 | Status: COMPLETED | OUTPATIENT
Start: 2023-06-05 | End: 2023-06-05

## 2023-06-05 RX ADMIN — Medication 650 MILLIGRAM(S): at 22:01

## 2023-06-05 RX ADMIN — CEFTRIAXONE 100 MILLIGRAM(S): 500 INJECTION, POWDER, FOR SOLUTION INTRAMUSCULAR; INTRAVENOUS at 22:01

## 2023-06-05 RX ADMIN — SODIUM CHLORIDE 500 MILLILITER(S): 9 INJECTION INTRAMUSCULAR; INTRAVENOUS; SUBCUTANEOUS at 23:30

## 2023-06-05 RX ADMIN — CEFTRIAXONE 2000 MILLIGRAM(S): 500 INJECTION, POWDER, FOR SOLUTION INTRAMUSCULAR; INTRAVENOUS at 22:31

## 2023-06-05 RX ADMIN — Medication 650 MILLIGRAM(S): at 22:31

## 2023-06-05 NOTE — ED PROVIDER NOTE - CLINICAL SUMMARY MEDICAL DECISION MAKING FREE TEXT BOX
89 y/o male, suspected acute urinary retention due to possible Vogel obstruction. Will change Vogel catheter, check labs, provide IV hydration, and reassess. 88 y/o male, suspected acute urinary retention due to possible Vogel obstruction. Will change Vogel catheter, check labs, provide IV hydration, and reassess. Anticipate admission.

## 2023-06-05 NOTE — ED PROVIDER NOTE - OBJECTIVE STATEMENT
89 y/o male, history of dementia, BPH w/ chronic Vogel catheter, A-Fib on Eliquis, hypertension, presenting w/ decreased urine output from catheter yesterday. Wife also noted patient has been shaking and has been more confused. History limited due to dementia. No known drug allergies.

## 2023-06-05 NOTE — ED PROVIDER NOTE - NSICDXPASTMEDICALHX_GEN_ALL_CORE_FT
PAST MEDICAL HISTORY:  Arrhythmia     Dementia     Hematuria     HTN (hypertension)     Visual impairment       Atrial fibrillation     BPH (benign prostatic hyperplasia)     Chronic indwelling Vogel catheter

## 2023-06-05 NOTE — ED ADULT TRIAGE NOTE - PATIENT'S GENDER IDENTITY
Problem: Patient Care Overview  Goal: Plan of Care Review   03/16/18 1814   Coping/Psychosocial   Plan of Care Reviewed With patient   Plan of Care Review   Progress no change   OTHER   Outcome Summary Pt stable. No changes noted during the shift. Pt's appeal was denied and has been appealed again via peer to peer appeal. Awaiting outcome for disposition. Will continue to monitor.       Problem: Wound (Includes Pressure Injury) (Adult)  Goal: Signs and Symptoms of Listed Potential Problems Will be Absent, Minimized or Managed (Wound)  Outcome: Ongoing (interventions implemented as appropriate)      Problem: Sepsis/Septic Shock (Adult)  Goal: Signs and Symptoms of Listed Potential Problems Will be Absent, Minimized or Managed (Sepsis/Septic Shock)  Outcome: Ongoing (interventions implemented as appropriate)         Male

## 2023-06-05 NOTE — ED ADULT NURSE NOTE - OBJECTIVE STATEMENT
Pt presents from home with wife for decreased urine output in vlaencia. pt is warm totouch pending core temp in progress. pt denies pain,

## 2023-06-05 NOTE — ED ADULT NURSE NOTE - ED STAT RN HANDOFF DETAILS
Report given to ELVIN Fernández. Patient in stable condition resting on the stretcher. No distress noted. Vogel intact draining dark red urine. Report given to ELVIN Fernández. Patient in stable condition resting on the stretcher. No distress noted. Vogel intact draining dark red urine.  Report given to ELVIN Brown ED Hold

## 2023-06-06 DIAGNOSIS — Z29.9 ENCOUNTER FOR PROPHYLACTIC MEASURES, UNSPECIFIED: ICD-10-CM

## 2023-06-06 DIAGNOSIS — N39.0 URINARY TRACT INFECTION, SITE NOT SPECIFIED: ICD-10-CM

## 2023-06-06 DIAGNOSIS — A41.9 SEPSIS, UNSPECIFIED ORGANISM: ICD-10-CM

## 2023-06-06 DIAGNOSIS — N40.1 BENIGN PROSTATIC HYPERPLASIA WITH LOWER URINARY TRACT SYMPTOMS: ICD-10-CM

## 2023-06-06 DIAGNOSIS — N17.9 ACUTE KIDNEY FAILURE, UNSPECIFIED: ICD-10-CM

## 2023-06-06 DIAGNOSIS — I48.91 UNSPECIFIED ATRIAL FIBRILLATION: ICD-10-CM

## 2023-06-06 DIAGNOSIS — R31.9 HEMATURIA, UNSPECIFIED: ICD-10-CM

## 2023-06-06 DIAGNOSIS — G93.41 METABOLIC ENCEPHALOPATHY: ICD-10-CM

## 2023-06-06 PROBLEM — H54.7 UNSPECIFIED VISUAL LOSS: Chronic | Status: ACTIVE | Noted: 2022-11-03

## 2023-06-06 PROBLEM — I10 ESSENTIAL (PRIMARY) HYPERTENSION: Chronic | Status: ACTIVE | Noted: 2022-11-03

## 2023-06-06 PROBLEM — F03.90 UNSPECIFIED DEMENTIA, UNSPECIFIED SEVERITY, WITHOUT BEHAVIORAL DISTURBANCE, PSYCHOTIC DISTURBANCE, MOOD DISTURBANCE, AND ANXIETY: Chronic | Status: ACTIVE | Noted: 2022-11-03

## 2023-06-06 PROBLEM — I49.9 CARDIAC ARRHYTHMIA, UNSPECIFIED: Chronic | Status: ACTIVE | Noted: 2022-11-03

## 2023-06-06 LAB
-  CTX-M RESISTANCE MARKER: SIGNIFICANT CHANGE UP
-  ESBL: SIGNIFICANT CHANGE UP
ANION GAP SERPL CALC-SCNC: 10 MMOL/L — SIGNIFICANT CHANGE UP (ref 5–17)
ANION GAP SERPL CALC-SCNC: 8 MMOL/L — SIGNIFICANT CHANGE UP (ref 5–17)
BUN SERPL-MCNC: 110 MG/DL — HIGH (ref 7–18)
BUN SERPL-MCNC: 110 MG/DL — HIGH (ref 7–18)
CALCIUM SERPL-MCNC: 8.7 MG/DL — SIGNIFICANT CHANGE UP (ref 8.4–10.5)
CALCIUM SERPL-MCNC: 9.6 MG/DL — SIGNIFICANT CHANGE UP (ref 8.4–10.5)
CHLORIDE SERPL-SCNC: 102 MMOL/L — SIGNIFICANT CHANGE UP (ref 96–108)
CHLORIDE SERPL-SCNC: 105 MMOL/L — SIGNIFICANT CHANGE UP (ref 96–108)
CO2 SERPL-SCNC: 19 MMOL/L — LOW (ref 22–31)
CO2 SERPL-SCNC: 20 MMOL/L — LOW (ref 22–31)
CREAT ?TM UR-MCNC: 42 MG/DL — SIGNIFICANT CHANGE UP
CREAT SERPL-MCNC: 5.75 MG/DL — HIGH (ref 0.5–1.3)
CREAT SERPL-MCNC: 6.11 MG/DL — HIGH (ref 0.5–1.3)
E COLI DNA BLD POS QL NAA+NON-PROBE: SIGNIFICANT CHANGE UP
EGFR: 8 ML/MIN/1.73M2 — LOW
EGFR: 9 ML/MIN/1.73M2 — LOW
GLUCOSE SERPL-MCNC: 109 MG/DL — HIGH (ref 70–99)
GLUCOSE SERPL-MCNC: 89 MG/DL — SIGNIFICANT CHANGE UP (ref 70–99)
GRAM STN FLD: SIGNIFICANT CHANGE UP
HCT VFR BLD CALC: 29.2 % — LOW (ref 39–50)
HCT VFR BLD CALC: 29.4 % — LOW (ref 39–50)
HGB BLD-MCNC: 9.6 G/DL — LOW (ref 13–17)
HGB BLD-MCNC: 9.7 G/DL — LOW (ref 13–17)
LACTATE SERPL-SCNC: 1.5 MMOL/L — SIGNIFICANT CHANGE UP (ref 0.7–2)
LACTATE SERPL-SCNC: 2.1 MMOL/L — HIGH (ref 0.7–2)
MAGNESIUM SERPL-MCNC: 1.9 MG/DL — SIGNIFICANT CHANGE UP (ref 1.6–2.6)
MCHC RBC-ENTMCNC: 25.9 PG — LOW (ref 27–34)
MCHC RBC-ENTMCNC: 26.1 PG — LOW (ref 27–34)
MCHC RBC-ENTMCNC: 32.7 GM/DL — SIGNIFICANT CHANGE UP (ref 32–36)
MCHC RBC-ENTMCNC: 33.2 GM/DL — SIGNIFICANT CHANGE UP (ref 32–36)
MCV RBC AUTO: 78.5 FL — LOW (ref 80–100)
MCV RBC AUTO: 79.5 FL — LOW (ref 80–100)
METHOD TYPE: SIGNIFICANT CHANGE UP
NRBC # BLD: 0 /100 WBCS — SIGNIFICANT CHANGE UP (ref 0–0)
NRBC # BLD: 0 /100 WBCS — SIGNIFICANT CHANGE UP (ref 0–0)
PHOSPHATE SERPL-MCNC: 4.1 MG/DL — SIGNIFICANT CHANGE UP (ref 2.5–4.5)
PLATELET # BLD AUTO: 226 K/UL — SIGNIFICANT CHANGE UP (ref 150–400)
PLATELET # BLD AUTO: 234 K/UL — SIGNIFICANT CHANGE UP (ref 150–400)
POTASSIUM SERPL-MCNC: 4.9 MMOL/L — SIGNIFICANT CHANGE UP (ref 3.5–5.3)
POTASSIUM SERPL-MCNC: 4.9 MMOL/L — SIGNIFICANT CHANGE UP (ref 3.5–5.3)
POTASSIUM SERPL-SCNC: 4.9 MMOL/L — SIGNIFICANT CHANGE UP (ref 3.5–5.3)
POTASSIUM SERPL-SCNC: 4.9 MMOL/L — SIGNIFICANT CHANGE UP (ref 3.5–5.3)
RBC # BLD: 3.7 M/UL — LOW (ref 4.2–5.8)
RBC # BLD: 3.72 M/UL — LOW (ref 4.2–5.8)
RBC # FLD: 16.7 % — HIGH (ref 10.3–14.5)
RBC # FLD: 16.9 % — HIGH (ref 10.3–14.5)
SODIUM SERPL-SCNC: 131 MMOL/L — LOW (ref 135–145)
SODIUM SERPL-SCNC: 133 MMOL/L — LOW (ref 135–145)
SODIUM UR-SCNC: 35 MMOL/L — SIGNIFICANT CHANGE UP
SPECIMEN SOURCE: SIGNIFICANT CHANGE UP
SPECIMEN SOURCE: SIGNIFICANT CHANGE UP
WBC # BLD: 13.65 K/UL — HIGH (ref 3.8–10.5)
WBC # BLD: 14.55 K/UL — HIGH (ref 3.8–10.5)
WBC # FLD AUTO: 13.65 K/UL — HIGH (ref 3.8–10.5)
WBC # FLD AUTO: 14.55 K/UL — HIGH (ref 3.8–10.5)

## 2023-06-06 PROCEDURE — 74176 CT ABD & PELVIS W/O CONTRAST: CPT | Mod: 26

## 2023-06-06 PROCEDURE — 12345: CPT | Mod: NC

## 2023-06-06 PROCEDURE — 99223 1ST HOSP IP/OBS HIGH 75: CPT

## 2023-06-06 RX ORDER — CHOLECALCIFEROL (VITAMIN D3) 125 MCG
0 CAPSULE ORAL
Qty: 0 | Refills: 0 | DISCHARGE

## 2023-06-06 RX ORDER — APIXABAN 2.5 MG/1
2.5 TABLET, FILM COATED ORAL
Refills: 0 | Status: DISCONTINUED | OUTPATIENT
Start: 2023-06-06 | End: 2023-06-06

## 2023-06-06 RX ORDER — SODIUM CHLORIDE 9 MG/ML
1000 INJECTION INTRAMUSCULAR; INTRAVENOUS; SUBCUTANEOUS
Refills: 0 | Status: DISCONTINUED | OUTPATIENT
Start: 2023-06-06 | End: 2023-06-06

## 2023-06-06 RX ORDER — MULTIVIT-MIN/FERROUS GLUCONATE 9 MG/15 ML
1 LIQUID (ML) ORAL
Qty: 0 | Refills: 0 | DISCHARGE

## 2023-06-06 RX ORDER — CEFTRIAXONE 500 MG/1
1000 INJECTION, POWDER, FOR SOLUTION INTRAMUSCULAR; INTRAVENOUS EVERY 24 HOURS
Refills: 0 | Status: DISCONTINUED | OUTPATIENT
Start: 2023-06-06 | End: 2023-06-06

## 2023-06-06 RX ORDER — SODIUM CHLORIDE 9 MG/ML
1000 INJECTION INTRAMUSCULAR; INTRAVENOUS; SUBCUTANEOUS
Refills: 0 | Status: DISCONTINUED | OUTPATIENT
Start: 2023-06-06 | End: 2023-06-08

## 2023-06-06 RX ORDER — ERTAPENEM SODIUM 1 G/1
500 INJECTION, POWDER, LYOPHILIZED, FOR SOLUTION INTRAMUSCULAR; INTRAVENOUS EVERY 24 HOURS
Refills: 0 | Status: COMPLETED | OUTPATIENT
Start: 2023-06-07 | End: 2023-06-14

## 2023-06-06 RX ORDER — SODIUM CHLORIDE 9 MG/ML
500 INJECTION INTRAMUSCULAR; INTRAVENOUS; SUBCUTANEOUS ONCE
Refills: 0 | Status: COMPLETED | OUTPATIENT
Start: 2023-06-06 | End: 2023-06-06

## 2023-06-06 RX ORDER — ALBUMIN HUMAN 25 %
100 VIAL (ML) INTRAVENOUS EVERY 8 HOURS
Refills: 0 | Status: COMPLETED | OUTPATIENT
Start: 2023-06-06 | End: 2023-06-08

## 2023-06-06 RX ORDER — ASCORBIC ACID 60 MG
0 TABLET,CHEWABLE ORAL
Qty: 0 | Refills: 0 | DISCHARGE

## 2023-06-06 RX ORDER — APIXABAN 2.5 MG/1
2.5 TABLET, FILM COATED ORAL
Refills: 0 | Status: DISCONTINUED | OUTPATIENT
Start: 2023-06-06 | End: 2023-06-16

## 2023-06-06 RX ORDER — ERTAPENEM SODIUM 1 G/1
500 INJECTION, POWDER, LYOPHILIZED, FOR SOLUTION INTRAMUSCULAR; INTRAVENOUS ONCE
Refills: 0 | Status: COMPLETED | OUTPATIENT
Start: 2023-06-06 | End: 2023-06-06

## 2023-06-06 RX ORDER — SODIUM CHLORIDE 9 MG/ML
500 INJECTION INTRAMUSCULAR; INTRAVENOUS; SUBCUTANEOUS ONCE
Refills: 0 | Status: COMPLETED | OUTPATIENT
Start: 2023-06-06 | End: 2023-06-07

## 2023-06-06 RX ORDER — ERTAPENEM SODIUM 1 G/1
INJECTION, POWDER, LYOPHILIZED, FOR SOLUTION INTRAMUSCULAR; INTRAVENOUS
Refills: 0 | Status: COMPLETED | OUTPATIENT
Start: 2023-06-06 | End: 2023-06-15

## 2023-06-06 RX ORDER — APIXABAN 2.5 MG/1
1 TABLET, FILM COATED ORAL
Qty: 0 | Refills: 0 | DISCHARGE

## 2023-06-06 RX ADMIN — ERTAPENEM SODIUM 100 MILLIGRAM(S): 1 INJECTION, POWDER, LYOPHILIZED, FOR SOLUTION INTRAMUSCULAR; INTRAVENOUS at 20:39

## 2023-06-06 RX ADMIN — SODIUM CHLORIDE 100 MILLILITER(S): 9 INJECTION INTRAMUSCULAR; INTRAVENOUS; SUBCUTANEOUS at 04:54

## 2023-06-06 RX ADMIN — SODIUM CHLORIDE 125 MILLILITER(S): 9 INJECTION INTRAMUSCULAR; INTRAVENOUS; SUBCUTANEOUS at 22:40

## 2023-06-06 RX ADMIN — SODIUM CHLORIDE 1000 MILLILITER(S): 9 INJECTION INTRAMUSCULAR; INTRAVENOUS; SUBCUTANEOUS at 08:14

## 2023-06-06 RX ADMIN — Medication 50 MILLILITER(S): at 22:54

## 2023-06-06 RX ADMIN — SODIUM CHLORIDE 500 MILLILITER(S): 9 INJECTION INTRAMUSCULAR; INTRAVENOUS; SUBCUTANEOUS at 00:30

## 2023-06-06 RX ADMIN — SODIUM CHLORIDE 125 MILLILITER(S): 9 INJECTION INTRAMUSCULAR; INTRAVENOUS; SUBCUTANEOUS at 16:14

## 2023-06-06 NOTE — H&P ADULT - NSHPPHYSICALEXAM_GEN_ALL_CORE
LOS: 1d    VITALS:   T(C): 36.7 (06-05-23 @ 23:59), Max: 38.3 (06-05-23 @ 19:22)  HR: 97 (06-05-23 @ 23:59) (97 - 100)  BP: 96/58 (06-05-23 @ 23:59) (96/58 - 99/64)  RR: 18 (06-05-23 @ 23:59) (18 - 18)  SpO2: 96% (06-05-23 @ 23:59) (95% - 96%)    GENERAL: lethargic  HEAD:  Atraumatic, Normocephalic  EYES: EOMI, PERRLA, conjunctiva and sclera clear  ENT: Moist mucous membranes  NECK: Supple, No JVD  CHEST/LUNG: Clear to auscultation bilaterally; No rales, rhonchi, wheezing, or rubs. Unlabored respirations  HEART: Regular rate and rhythm; No murmurs, rubs, or gallops  ABDOMEN: BSx4; Soft, nontender, nondistended  EXTREMITIES:  2+ Peripheral Pulses, brisk capillary refill. No clubbing, cyanosis, or edema  NERVOUS SYSTEM:  unable to assess, as patient not responding to questions appropriately  SKIN: No rashes or lesions

## 2023-06-06 NOTE — SWALLOW BEDSIDE ASSESSMENT ADULT - PHARYNGEAL PHASE
no overt s/s of aspiration/Within functional limits coughing x3/Delayed pharyngeal swallow/Decreased laryngeal elevation/Delayed cough post oral intake/Multiple swallows Within functional limits no overt s/s of aspiration/Multiple swallows

## 2023-06-06 NOTE — CONSULT NOTE ADULT - ASSESSMENT
Patient is a 89yo Male with h/o dementia, bedbound, BPH w/ chronic valencia, afib (on eliquis), HTN that presents to the ED due to decreased urinary output from valencia catheter. Pt a/w TERENCE. Nephrology consulted for Elevated serum creatinine. Patient is a 88yo Male with h/o dementia, bedbound, BPH w/ chronic valencia, afib (on eliquis), HTN that presents to the ED due to decreased urinary output from valencia catheter. Pt a/w TERENCE. Nephrology consulted for Elevated serum creatinine.  CT abd/pelvis with nodular hepatic parenchymal contour suggestive of underlying hepatocellular dz/cirrhosis. Moderate b/l hydro with hydroureter identified to the level of the urinary bladder. Compleletly decompressed bladder by valencia with bladder wall thickening    1. TERENCE- unknown baseline SCr. Prevous SCr 1.66 on 5/29/23 & SCr 1.71 on 5/22/23. TERENCE in the setting of obstructive uropathy with ?cirrhosis with sepsis 2/2 UTI/ relative hypotension. Recc Urology consult (see below).   UA active in the setting of infection. Check urine lytes. Recc to repeat Renal US in 2-3 days. Strict I/Os. Avoid nephrotoxins/ NSAIDs/ RCA. Monitor BMP.  2. b/l hydronephrosis- Moderate b/l hydro with hydroureter with compressed bladder by valencia. Recc Urology consult for possible NT. Recc good bowel regimen  3. Sepsis 2/2 UTI- (leukocytosis with tachycardia) +UA. Pt on Ceftriaxone. UCx and BCx pending.   4. Hypotension- BP low normal with ?cirrhosis. Recc albumin gtt 25% in 100ml q 8hrs x 48 hrs. Monitor BP    [facility]  Avelino Fair M.D.  Harrison Barajas D.O.  Carolann Regalado M.D.  Janine Ackerman, [place], ANP-C  [phone]  Fax: [phone] [place] [address], [id number]  [place] [address]

## 2023-06-06 NOTE — CONSULT NOTE ADULT - ATTENDING COMMENTS
89M w/ PMH dementia, bedbound, BPH w/ chronic Valencia, Afib (on Eliquis), HTN that presents to the ED due to decreased urinary output from valencia catheter. Catheter exchanged by ED on presentation. Cr 5.75 from baseline 1.30 in November.    - Labs reviewed  - CT images reviewed - hydronephrosis expected in setting of recent urinary retention in patient with history of prior hydronephrosis  - Continue Valencia catheter  - Trend Cr  - Consider further imaging (LINDA) in 3 days if Cr does not improve  - Continue IV antibiotics  - Follow up urine and blood cultures  - Follow up nephrology recommendations

## 2023-06-06 NOTE — H&P ADULT - PROBLEM SELECTOR PLAN 3
Will hold eliquis in setting of hematuria Pt w/ gross blood in valencia catheter  Will hold eliquis for now Pt w/ TERENCE on CKD likely prerenal and postrenal  Cr 5.89, baseline 1.3  Vogel replaced in ED  Start on NS 100cc/hr

## 2023-06-06 NOTE — CONSULT NOTE ADULT - ASSESSMENT
89M w/ PMH dementia, bedbound, BPH w/ chronic Valencia, Afib (on Eliquis), HTN that presents to the ED due to decreased urinary output from valencia catheter. Catheter exchanged by ED on presentation. Cr 5.75 from baseline 1.30 in November.    - Labs reviewed  - CT images reviewed - hydronephrosis expected in setting of recent urinary retention in patient with history of prior hydronephrosis  - Continue Valencia catheter  - Trend Cr  - Consider further imaging (LINDA) in 3 days if Cr does not improve  - Continue IV antibiotics  - Follow up urine and blood cultures  - Follow up nephrology recommendations    Case discussed with Dr. Chris

## 2023-06-06 NOTE — SWALLOW BEDSIDE ASSESSMENT ADULT - SLP PERTINENT HISTORY OF CURRENT PROBLEM
Patient is a 89yo Male with h/o dementia, bedbound, BPH w/ chronic valencia, afib (on eliquis), HTN that presents to the ED due to decreased urinary output from valencia catheter.

## 2023-06-06 NOTE — H&P ADULT - PROBLEM SELECTOR PLAN 1
Pt w/ sepsis (HR 97, T 100.9, WBC 15k) 2/2 UTI   Vogel changed in ED  Lactate 2.1 > f/u repeat lactate until resolution  Place on NS 100cc/hr  Pt is lethargic, minimally responsive. Will keep NPO  Start on CTX  f/u Ucx and Bcx

## 2023-06-06 NOTE — H&P ADULT - PROBLEM SELECTOR PLAN 2
Pt w/ gross blood in valencia catheter  Will hold eliquis for now Pt w/ TERENCE on CKD likely prerenal and postrenal  Cr 5.89, baseline 1.3  Vogel replaced in ED  Start on NS 100cc/hr Pt w/ AMS 2/2 delirium in setting of UTI

## 2023-06-06 NOTE — SWALLOW BEDSIDE ASSESSMENT ADULT - CONSISTENCIES ADMINISTERED
water/moderately thick brooke cracker + applesauce/soft & bite-sized ice chips water/mildly thick applesauce/pureed

## 2023-06-06 NOTE — SWALLOW BEDSIDE ASSESSMENT ADULT - COMMENTS
HOB elevated to 90° AAOx1, minimally verbal. Partial top and bottom natural dentition. P/w oropharyngeal dysphagia - adequate labial seal, mildly prolonged yet complete mastication, decreased bolus control, suspected delayed swallow trigger, 1-2 swallows per bolus, mildly decreased hyolaryngeal elevation upon palpation. S/s of aspiration on mildly thick liquids po trials. Continues with aspiration risk c/b cognitive status.

## 2023-06-06 NOTE — H&P ADULT - ATTENDING COMMENTS
89 year old man with dementia, BPH with chronic indwelling valencia catheter, A-fib on Eliquis, HTN here with blocked catheter and urinary retention.  No fevers in the ED.     Vital Signs Last 24 Hrs  T(C): 36.7 (2023 23:59), Max: 38.3 (2023 19:22)  T(F): 98 (2023 23:59), Max: 100.9 (2023 19:22)  HR: 97 (2023 23:59) (97 - 100)  BP: 96/58 (2023 23:59) (96/58 - 99/64)  RR: 18 (2023 23:59) (18 - 18)  SpO2: 96% (2023 23:59) (95% - 96%)    Elderly man, lying in bed, asleep, opens eyes to name but no verbal or nonverbal response to command  Grunts to palpation of his abdomen   Valencia catheter in place draining straw colored urine with clumps    Labs   wbc - 15.8  h- 10.5 --> 9.7  MCV 79  lactate 2.1  Na - 129  HCo3 - 18  BUN- 108  Cr - 5.89    Urinalysis Basic - ( 2023 22:10 )  Color: Yellow / Appearance: Slightly Turbid / S.010 / pH: x  Gluc: x / Ketone: Negative  / Bili: Negative / Urobili: Negative   Blood: x / Protein: 100 mg/dL / Nitrite: Negative   Leuk Esterase: Moderate / RBC: 10-25 /HPF / WBC 11-25 /HPF   Sq Epi: x / Non Sq Epi: x / Bacteria: Moderate /HPF    Impression   - Sepsis - HR/ wbc/ lactate  - Complicated UTI/ CAUTI  - Acute encephalopathy  - Hematuria - traumatic Valencia vs blood thinner induced?  - TERENCE on CKD 3  Obstructive uropathy and pre-renal TERENCE  - NAGMA  - Hyponatremia    Plan   Admit to Medicine   Valencia cathter changed   Sepsis work up   IVF hydration   Empiric antibiotics with ceftriaxone   Monitor hematuria  H/H, type and screen, urology if hematuria persists  IF renal indices do not improve significantly with relieve of urinary obstruction and IVF challenge, nephrology consult  re-evaluate mental status

## 2023-06-06 NOTE — SWALLOW BEDSIDE ASSESSMENT ADULT - MODE OF PRESENTATION
4 trials/spoon/fed by clinician 5 individual sips/cup/fed by clinician 6 trials/spoon/fed by clinician 4 individual sips/cup/fed by clinician

## 2023-06-06 NOTE — CONSULT NOTE ADULT - SUBJECTIVE AND OBJECTIVE BOX
HPI  89 year old male w/ PMH dementia, bedbound, BPH w/ chronic valencia, Afib (on Eliquis), HTN that presents to the ED due to decreased urinary output from valencia catheter. Catheter exchanged by ED on presentation.    Urology consulted for bilateral moderate hydronephrosis seen on CT. Patient was alert and oriented last week, but currently unable to obtain history.      PAST MEDICAL & SURGICAL HISTORY:  Hematuria      HTN (hypertension)      Arrhythmia      Visual impairment      Dementia      BPH (benign prostatic hyperplasia)      Chronic indwelling Valencia catheter      Atrial fibrillation      TERENCE (acute kidney injury)          MEDICATIONS  (STANDING):  apixaban 2.5 milliGRAM(s) Oral two times a day  cefTRIAXone   IVPB 1000 milliGRAM(s) IV Intermittent every 24 hours  sodium chloride 0.9%. 1000 milliLiter(s) (125 mL/Hr) IV Continuous <Continuous>  sodium chloride 0.9%. 1000 milliLiter(s) (125 mL/Hr) IV Continuous <Continuous>    MEDICATIONS  (PRN):      FAMILY HISTORY:      Allergies    No Known Allergies    Intolerances        SOCIAL HISTORY:    REVIEW OF SYSTEMS:   Otherwise negative as stated in HPI    Physical Exam  Vital signs  T(C): 37.7 (23 @ 16:30), Max: 38.3 (23 @ 19:22)  HR: 99 (23 @ 16:30)  BP: 97/61 (23 @ 16:30)  SpO2: 96% (23 @ 16:30)  Wt(kg): --    Output    OUT:    Indwelling Catheter - Urethral (mL): 1400 mL  Total OUT: 1400 mL    Total NET: -1400 mL          Gen: NAD  Pulm: No respiratory distress  Abd: Soft, nontender, nondistended, no CVA tenderness  : Catheter in place draining clear, light pink urine with sediment        LABS:       @ 10:47    WBC 14.55 / Hct 29.4  / SCr 5.75      @ 04:41    WBC 13.65 / Hct 29.2  / SCr 6.11         133<L>  |  105  |  110<H>  ----------------------------<  89  4.9   |  20<L>  |  5.75<H>    Ca    8.7      2023 10:47  Phos  4.1       Mg     1.9     -    TPro  8.0  /  Alb  2.5<L>  /  TBili  0.8  /  DBili  x   /  AST  36  /  ALT  24  /  AlkPhos  118        Urinalysis Basic - ( 2023 22:10 )    Color: Yellow / Appearance: Slightly Turbid / S.010 / pH: x  Gluc: x / Ketone: Negative  / Bili: Negative / Urobili: Negative   Blood: x / Protein: 100 mg/dL / Nitrite: Negative   Leuk Esterase: Moderate / RBC: 10-25 /HPF / WBC 11-25 /HPF   Sq Epi: x / Non Sq Epi: x / Bacteria: Moderate /HPF        Urine Cx: Pending  Blood Cx: Pending    RADIOLOGY:          ACC: [id number] EXAM: CT ABDOMEN AND PELVIS ORDERED BY: JANICE VÁSQUEZ    PROCEDURE DATE: 2023        INTERPRETATION: INDICATION: Recent urine output.    COMPARISON:  1. CT ABDOMEN AND PELVIS WITHOUT AND WITH IV CONTRAST 2022 5:36 PM  2. CT ABDOMEN AND PELVIS 11/3/2022 2:35 PM    CONTRAST/COMPLICATIONS:  IV Contrast: None  Oral Contrast: None  Complications: Not applicable    PROCEDURE:  CT of the Abdomen was performed.  Sagittal and coronal reformats were performed.    FINDINGS:  LOWER CHEST: Cardiomegaly. There is small right pleural effusion. Opacity identified within the posterior/inferior bilateral lower lobes may be related to dependent atelectasis; underlying infiltrate/consolidation cannot be excluded.    LIVER: Normal in size. Nodular hepatic parenchymal contour suggest underlying hepatocellular disease/cirrhosis. No definite focal hepatic masses are identified on this noncontrast evaluation.  BILE DUCTS: Normal caliber.  GALLBLADDER: Hyperdensity is identified within the gallbladder lumen, which may represent a gallstone or sludge. No gallbladder wall thickening.  SPLEEN: Within normal limits.  PANCREAS: Within normal limits.  ADRENALS: Within normal limits.  KIDNEYS/URETERS: Moderate bilateral hydronephrosis, with hydroureter identified to the level of the urinary bladder. Nonspecific stranding of the bilateral perirenal fat. No renal calculi identified. No definite space-occupying lesions of the renal parenchyma noted.  BOWEL: Fecal material scattered throughout the colon. No evidence for mechanical bowel obstruction. No colonic wall thickening. Colonic diverticulosis. The appendix is not visualized with certainty.    URINARY BLADDER: Completely decompressed by an indwelling Valencia catheter. Bladder wall thickening or the presence of bladder diverticula cannot be excluded.  REPRODUCTIVE: Prostate unremarkable    PERITONEUM: No ascites. No free intraperitoneal air.  VESSELS: Atherosclerotic changes.  RETROPERITONEUM/LYMPH NODES: No lymphadenopathy.  ABDOMINAL WALL: Within normal limits.  BONES: Degenerative changes. Minimal retrolisthesis of L5 on S1.    IMPRESSION: Moderate bilateral hydronephrosis, with hydroureter identified to the level of the urinary bladder. Nonspecific stranding of the bilateral perirenal fat. Completely decompressed by an indwelling Valencia catheter. Bladder wall thickening or the presence of bladder diverticula cannot be excluded. Nodular hepatic parenchymal contour suggest underlying hepatocellular disease/cirrhosis.    Preliminary report provided by Dr. Nino of [facility].

## 2023-06-06 NOTE — H&P ADULT - PROBLEM SELECTOR PLAN 4
Will hold eliquis in setting of hematuria  Place on SCD's on terazosin  will hold PO meds in setting of lethargy Pt w/ gross blood in valencia catheter, now clearing  resume on eliquis

## 2023-06-06 NOTE — H&P ADULT - ASSESSMENT
This is an 88 yo M with pmhx of dementia, BPH w/ chronic valencia, afib (on eliquis), HTN that presents to the ED due to decreased urinary output from valencia catheter admitted for urinary retention.

## 2023-06-06 NOTE — ED ADULT NURSE REASSESSMENT NOTE - NS ED NURSE REASSESS COMMENT FT1
10 pm pt resting now ., F/C removed ,#16 F/C inserted under sterile condition   , urine draining  good

## 2023-06-06 NOTE — CHART NOTE - NSCHARTNOTEFT_GEN_A_CORE
89 year old man with dementia, BPH with chronic indwelling valencia catheter, A-fib on Eliquis, HTN here with blocked catheter and urinary retention.  No fevers in the ED.     Vital Signs Last 24 Hrs  T(C): 36.7 (2023 23:59), Max: 38.3 (2023 19:22)  T(F): 98 (2023 23:59), Max: 100.9 (2023 19:22)  HR: 97 (2023 23:59) (97 - 100)  BP: 96/58 (2023 23:59) (96/58 - 99/64)  RR: 18 (2023 23:59) (18 - 18)  SpO2: 96% (2023 23:59) (95% - 96%)    Labs   wbc - 15.8  h- 10.5 --> 9.7  MCV 79  lactate 2.1  Na - 129  HCo3 - 18  BUN- 108  Cr - 5.89    Urinalysis Basic - ( 2023 22:10 )  Color: Yellow / Appearance: Slightly Turbid / S.010 / pH: x  Gluc: x / Ketone: Negative  / Bili: Negative / Urobili: Negative   Blood: x / Protein: 100 mg/dL / Nitrite: Negative   Leuk Esterase: Moderate / RBC: 10-25 /HPF / WBC 11-25 /HPF   Sq Epi: x / Non Sq Epi: x / Bacteria: Moderate /HPF    Impression   - Sepsis - HR/ wbc/ lactate  - Complicated UTI/ CAUTI  - TERENCE on CKD 3  Obstructive uropathy and pre-renal TERENCE  - NAGMA  - Hyponatremia 89 year old man with dementia, BPH with chronic indwelling valencia catheter, A-fib on Eliquis, HTN here with blocked catheter and urinary retention.  No fevers in the ED.     Vital Signs Last 24 Hrs  T(C): 36.7 (2023 23:59), Max: 38.3 (2023 19:22)  T(F): 98 (2023 23:59), Max: 100.9 (2023 19:22)  HR: 97 (2023 23:59) (97 - 100)  BP: 96/58 (2023 23:59) (96/58 - 99/64)  RR: 18 (2023 23:59) (18 - 18)  SpO2: 96% (2023 23:59) (95% - 96%)    Labs   wbc - 15.8  h- 10.5 --> 9.7  MCV 79  lactate 2.1  Na - 129  HCo3 - 18  BUN- 108  Cr - 5.89    Urinalysis Basic - ( 2023 22:10 )  Color: Yellow / Appearance: Slightly Turbid / S.010 / pH: x  Gluc: x / Ketone: Negative  / Bili: Negative / Urobili: Negative   Blood: x / Protein: 100 mg/dL / Nitrite: Negative   Leuk Esterase: Moderate / RBC: 10-25 /HPF / WBC 11-25 /HPF   Sq Epi: x / Non Sq Epi: x / Bacteria: Moderate /HPF    Impression   - Sepsis - HR/ wbc/ lactate  - Complicated UTI/ CAUTI  - Hematuris - traumatic valencia vs blood thinner induced?  - TERENCE on CKD 3  Obstructive uropathy and pre-renal TERENCE  - NAGMA  - Hyponatremia    Plan   Admit to Medicine   Valencia cathter changed   Sepsis work up   IVF hydration   Empiric antibiotics with ceftriaxone   Monitor hematuria  H/H, type and screen, urology if hematuria persists  IF renal indices do not improve significantly with relieve of urinary obstruction and IVF challenge, nephrology consult 89 year old man with dementia, BPH with chronic indwelling valencia catheter, A-fib on Eliquis, HTN here with blocked catheter and urinary retention.  No fevers in the ED.     Vital Signs Last 24 Hrs  T(C): 36.7 (2023 23:59), Max: 38.3 (2023 19:22)  T(F): 98 (2023 23:59), Max: 100.9 (2023 19:22)  HR: 97 (2023 23:59) (97 - 100)  BP: 96/58 (2023 23:59) (96/58 - 99/64)  RR: 18 (2023 23:59) (18 - 18)  SpO2: 96% (2023 23:59) (95% - 96%)    Labs   wbc - 15.8  h- 10.5 --> 9.7  MCV 79  lactate 2.1  Na - 129  HCo3 - 18  BUN- 108  Cr - 5.89    Urinalysis Basic - ( 2023 22:10 )  Color: Yellow / Appearance: Slightly Turbid / S.010 / pH: x  Gluc: x / Ketone: Negative  / Bili: Negative / Urobili: Negative   Blood: x / Protein: 100 mg/dL / Nitrite: Negative   Leuk Esterase: Moderate / RBC: 10-25 /HPF / WBC 11-25 /HPF   Sq Epi: x / Non Sq Epi: x / Bacteria: Moderate /HPF    Impression   - Sepsis - HR/ wbc/ lactate  - Complicated UTI/ CAUTI  - Acute encephalopathy  - Hematuria - traumatic Valencia vs blood thinner induced?  - TERENCE on CKD 3  Obstructive uropathy and pre-renal TERENCE  - NAGMA  - Hyponatremia    Plan   Admit to Medicine   Valencia cathter changed   Sepsis work up   IVF hydration   Empiric antibiotics with ceftriaxone   Monitor hematuria  H/H, type and screen, urology if hematuria persists  IF renal indices do not improve significantly with relieve of urinary obstruction and IVF challenge, nephrology consult

## 2023-06-06 NOTE — CHART NOTE - NSCHARTNOTEFT_GEN_A_CORE
Patient seen and examined in the emergency department in spot 6. Patient is lethargic but also appears to be extremely [place]. When I yelled into his left ear, he was able to respond and speak with me. In brief, this is a 89 yo M with a-fib (on Eliquis), chronic indwelling valencia catheter, HTN, dementia, CKD3 who presented for metabolic encephalopathy, decreased urine output and concerns for urinary retention. On evaluation today, patient appears to have hematuria, either the result of the Eliquis or possible from traumatic valencia placement. On CT A/P patient has stable hydroureteronephrosis even though the valencia catheter is collapsed around the bladder. Creatinine has not really improved with replacement of valencia catheter. As such, given the CT findings and hematuria, will consult both urology and nephrology for further evaluation of his TERENCE on CKD3. Patient met sepsis criteria on admission with fever of 100.9, leukocytosis. His BP (in someone with a reported history of HTN) is rather soft. Continue IVF for now and monitor both pressures and volume status closely. Agree with ceftriaxone for likely community-acquired CAUTI. Remaining care as per H/P written earlier in the day.

## 2023-06-06 NOTE — H&P ADULT - NSICDXPASTMEDICALHX_GEN_ALL_CORE_FT
PAST MEDICAL HISTORY:  Arrhythmia     Atrial fibrillation     BPH (benign prostatic hyperplasia)     Chronic indwelling Vogel catheter     Dementia     Hematuria     HTN (hypertension)     Visual impairment

## 2023-06-06 NOTE — H&P ADULT - CONVERSATION DETAILS
An extensive goals of care conversation was held including options, risks and benefits of many medical treatments including CPR, intubation, and tube feeding. Wife, Piper Han and son, state that they do not want the patient to suffer if his heart  were to stop or his breathing were to worsen. They would like to continue all medical management and treatments.  As per his best interests, pt has been made DNR/DNI. A MOLST has been completed to this effect.

## 2023-06-06 NOTE — PATIENT PROFILE ADULT - FALL HARM RISK - HARM RISK INTERVENTIONS
Assistance with ambulation/Assistance OOB with selected safe patient handling equipment/Communicate Risk of Fall with Harm to all staff/Monitor gait and stability/Provide patient with walking aids - walker, cane, crutches/Reinforce activity limits and safety measures with patient and family/Tailored Fall Risk Interventions/Visual Cue: Yellow wristband and red socks/Bed in lowest position, wheels locked, appropriate side rails in place/Call bell, personal items and telephone in reach/Instruct patient to call for assistance before getting out of bed or chair/Non-slip footwear when patient is out of bed/[place] to call system/Physically safe environment - no spills, clutter or unnecessary equipment/Purposeful Proactive Rounding/Room/bathroom lighting operational, light cord in reach

## 2023-06-06 NOTE — H&P ADULT - HISTORY OF PRESENT ILLNESS
This is an 88 yo M with pmhx of dementia, BPH This is an 88 yo M with pmhx of dementia, BPH w/ chronic valencia, afib (on eliquis), HTN that presents to the ED due to decreased urinary output from valencia catheter. Hx obtained from chart review, patient unable to provide hx in setting of dementia, called wife, with no answer, voice message was left.  This is an 88 yo M from home, bedbound, HHA 8hrs/7days with pmhx of dementia, BPH w/ chronic valencia, afib (on eliquis), HTN that presents to the ED due to decreased urinary output from valencia catheter. Hx obtained from wife, Piper Han , patient unable to provide hx in setting of dementia, she states he is able to talk at baseline, and was AAOx3 last week. He came home from rehab 5 days ago, after being there for 6.5 months ago, last UTI since November, this is not his baseline. Since he got home 5 days ago, he hasn't been himself.    In ED v/s: T: 100.9, HR 97, BP 96/58, RR 18, SPO2: 96% on RA

## 2023-06-06 NOTE — CONSULT NOTE ADULT - SUBJECTIVE AND OBJECTIVE BOX
[facility] NEPHROLOGY- CONSULTATION NOTE    Patient is a 89yo Male with h/o dementia, bedbound, BPH w/ chronic valencia, afib (on eliquis), HTN that presents to the ED due to decreased urinary output from valencia catheter. Pt a/w TERENCE. Nephrology consulted for Elevated serum creatinine.    As per H& P, He came home from rehab 5 days ago, after being there for 6.5 months ago, He was AO x3 last week.     Unable to obtain history from pt; not answering questions    PAST MEDICAL & SURGICAL HISTORY:  Hematuria      HTN (hypertension)      Arrhythmia      Visual impairment      Dementia      BPH (benign prostatic hyperplasia)      Chronic indwelling Valencia catheter      Atrial fibrillation      TERENCE (acute kidney injury)        No Known Allergies    Home Medications Reviewed  Hospital Medications:   MEDICATIONS  (STANDING):  apixaban 2.5 milliGRAM(s) Oral two times a day  cefTRIAXone   IVPB 1000 milliGRAM(s) IV Intermittent every 24 hours  sodium chloride 0.9%. 1000 milliLiter(s) (125 mL/Hr) IV Continuous <Continuous>  sodium chloride 0.9%. 1000 milliLiter(s) (125 mL/Hr) IV Continuous <Continuous>    REVIEW OF SYSTEMS: Unable to obtain; not answering questions    VITALS:  T(F): 96.9 (23 @ 11:23), Max: 100.9 (23 @ 19:22)  HR: 99 (23 @ 11:23)  BP: 96/59 (23 @ 11:23)  RR: 18 (23 @ 11:23)  SpO2: 99% (23 @ 11:23)  Wt(kg): --     @ 07:01  -   07:00  --------------------------------------------------------  IN: 0 mL / OUT: 1400 mL / NET: -1400 mL      Height (cm): 190.5 ( 17:15)  Weight (kg): 72.6 ( 17:15)  BMI (kg/m2): 20 ( 17:15)  BSA (m2): 2 (06-05 @ 17:15)      PHYSICAL EXAM:  Gen: NAD,   HEENT: anicteric  Neck: no JVD  Cards: RRR, +S1/S2, no M/G/R  Resp: CTA B/L  GI: soft, NT/ND, NABS  : +valencia dark urine  Extremities: no LE edema B/L; tender to palpatio  Derm: no rashes  Neuro: not answering questins    LABS:      133<L>  |  105  |  110<H>  ----------------------------<  89  4.9   |  20<L>  |  5.75<H>    Ca    8.7      2023 10:47  Phos  4.1       Mg     1.9         TPro  8.0  /  Alb  2.5<L>  /  TBili  0.8  /  DBili      /  AST  36  /  ALT  24  /  AlkPhos  118      Creatinine Trend: 5.75 <--, 6.11 <--, 5.89 <--                        9.6    14.55 )-----------( 226      ( 2023 10:47 )             29.4     Urine Studies:  Urinalysis Basic - ( 2023 22:10 )    Color: Yellow / Appearance: Slightly Turbid / S.010 / pH:   Gluc:  / Ketone: Negative  / Bili: Negative / Urobili: Negative   Blood:  / Protein: 100 mg/dL / Nitrite: Negative   Leuk Esterase: Moderate / RBC: 10-25 /HPF / WBC 11-25 /HPF   Sq Epi:  / Non Sq Epi:  / Bacteria: Moderate /HPF        RADIOLOGY & ADDITIONAL STUDIES:    < from: CT Abdomen and Pelvis No Cont (23 @ 02:25) >    ACC: [id number] EXAM:  CT ABDOMEN AND PELVIS   ORDERED BY: JANICE VÁSQUEZ     PROCEDURE DATE:  2023      < end of copied text >  < from: CT Abdomen and Pelvis No Cont (23 @ 02:25) >  KIDNEYS/URETERS: Moderate bilateral hydronephrosis, with hydroureter   identified to the level of the urinary bladder. Nonspecific stranding of   the bilateral perirenal fat. No renal calculi identified. No definite   space-occupying lesions of the renal parenchyma noted.  BOWEL:Fecal material scattered throughout the colon. No evidence for   mechanical bowel obstruction. No colonic wall thickening. Colonic   diverticulosis. The appendix is not visualized with certainty.    URINARY BLADDER: Completely decompressed by an indwelling Valencia catheter.    Bladder wall thickening or the presence of bladder diverticula cannot be   excluded.    < end of copied text >  < from: CT Abdomen and Pelvis No Cont (23 @ 02:25) >  IMPRESSION:  Moderate bilateral hydronephrosis, with hydroureter   identified to the level of the urinarybladder. Nonspecific stranding of   the bilateral perirenal fat.  Completely decompressed by an indwelling   Valencia catheter.  Bladder wall thickening or the presence of bladder   diverticula cannot be excluded. Nodular hepatic parenchymal contour   suggest underlying hepatocellular disease/cirrhosis.    Preliminary report provided by Dr. Nino of [facility].    --- End of Report ---    < end of copied text >

## 2023-06-07 DIAGNOSIS — A49.8 OTHER BACTERIAL INFECTIONS OF UNSPECIFIED SITE: ICD-10-CM

## 2023-06-07 DIAGNOSIS — N13.9 OBSTRUCTIVE AND REFLUX UROPATHY, UNSPECIFIED: ICD-10-CM

## 2023-06-07 DIAGNOSIS — R78.81 BACTEREMIA: ICD-10-CM

## 2023-06-07 DIAGNOSIS — T83.511A INFECTION AND INFLAMMATORY REACTION DUE TO INDWELLING URETHRAL CATHETER, INITIAL ENCOUNTER: ICD-10-CM

## 2023-06-07 DIAGNOSIS — Z02.9 ENCOUNTER FOR ADMINISTRATIVE EXAMINATIONS, UNSPECIFIED: ICD-10-CM

## 2023-06-07 DIAGNOSIS — F03.90 UNSPECIFIED DEMENTIA WITHOUT BEHAVIORAL DISTURBANCE: ICD-10-CM

## 2023-06-07 DIAGNOSIS — A41.9 SEPSIS, UNSPECIFIED ORGANISM: ICD-10-CM

## 2023-06-07 DIAGNOSIS — N32.3 DIVERTICULUM OF BLADDER: ICD-10-CM

## 2023-06-07 DIAGNOSIS — G93.49 OTHER ENCEPHALOPATHY: ICD-10-CM

## 2023-06-07 LAB
ANION GAP SERPL CALC-SCNC: 7 MMOL/L — SIGNIFICANT CHANGE UP (ref 5–17)
BUN SERPL-MCNC: 113 MG/DL — HIGH (ref 7–18)
CALCIUM SERPL-MCNC: 8.2 MG/DL — LOW (ref 8.4–10.5)
CHLORIDE SERPL-SCNC: 112 MMOL/L — HIGH (ref 96–108)
CO2 SERPL-SCNC: 18 MMOL/L — LOW (ref 22–31)
CREAT SERPL-MCNC: 5.06 MG/DL — HIGH (ref 0.5–1.3)
EGFR: 10 ML/MIN/1.73M2 — LOW
GLUCOSE SERPL-MCNC: 74 MG/DL — SIGNIFICANT CHANGE UP (ref 70–99)
HCT VFR BLD CALC: 28.1 % — LOW (ref 39–50)
HGB BLD-MCNC: 9.1 G/DL — LOW (ref 13–17)
MAGNESIUM SERPL-MCNC: 1.9 MG/DL — SIGNIFICANT CHANGE UP (ref 1.6–2.6)
MCHC RBC-ENTMCNC: 25.6 PG — LOW (ref 27–34)
MCHC RBC-ENTMCNC: 32.4 GM/DL — SIGNIFICANT CHANGE UP (ref 32–36)
MCV RBC AUTO: 79.2 FL — LOW (ref 80–100)
NRBC # BLD: 0 /100 WBCS — SIGNIFICANT CHANGE UP (ref 0–0)
PHOSPHATE SERPL-MCNC: 4.5 MG/DL — SIGNIFICANT CHANGE UP (ref 2.5–4.5)
PLATELET # BLD AUTO: 217 K/UL — SIGNIFICANT CHANGE UP (ref 150–400)
POTASSIUM SERPL-MCNC: 3.9 MMOL/L — SIGNIFICANT CHANGE UP (ref 3.5–5.3)
POTASSIUM SERPL-SCNC: 3.9 MMOL/L — SIGNIFICANT CHANGE UP (ref 3.5–5.3)
RBC # BLD: 3.55 M/UL — LOW (ref 4.2–5.8)
RBC # FLD: 17 % — HIGH (ref 10.3–14.5)
SODIUM SERPL-SCNC: 137 MMOL/L — SIGNIFICANT CHANGE UP (ref 135–145)
WBC # BLD: 10.31 K/UL — SIGNIFICANT CHANGE UP (ref 3.8–10.5)
WBC # FLD AUTO: 10.31 K/UL — SIGNIFICANT CHANGE UP (ref 3.8–10.5)

## 2023-06-07 PROCEDURE — 99232 SBSQ HOSP IP/OBS MODERATE 35: CPT

## 2023-06-07 PROCEDURE — 99222 1ST HOSP IP/OBS MODERATE 55: CPT

## 2023-06-07 PROCEDURE — 99233 SBSQ HOSP IP/OBS HIGH 50: CPT | Mod: FS

## 2023-06-07 RX ORDER — SODIUM CHLORIDE 9 MG/ML
1000 INJECTION INTRAMUSCULAR; INTRAVENOUS; SUBCUTANEOUS ONCE
Refills: 0 | Status: COMPLETED | OUTPATIENT
Start: 2023-06-07 | End: 2023-06-07

## 2023-06-07 RX ORDER — SODIUM CHLORIDE 9 MG/ML
500 INJECTION INTRAMUSCULAR; INTRAVENOUS; SUBCUTANEOUS ONCE
Refills: 0 | Status: COMPLETED | OUTPATIENT
Start: 2023-06-07 | End: 2023-06-07

## 2023-06-07 RX ADMIN — SODIUM CHLORIDE 1000 MILLILITER(S): 9 INJECTION INTRAMUSCULAR; INTRAVENOUS; SUBCUTANEOUS at 02:35

## 2023-06-07 RX ADMIN — SODIUM CHLORIDE 1000 MILLILITER(S): 9 INJECTION INTRAMUSCULAR; INTRAVENOUS; SUBCUTANEOUS at 01:02

## 2023-06-07 RX ADMIN — Medication 50 MILLILITER(S): at 14:46

## 2023-06-07 RX ADMIN — Medication 50 MILLILITER(S): at 05:53

## 2023-06-07 RX ADMIN — APIXABAN 2.5 MILLIGRAM(S): 2.5 TABLET, FILM COATED ORAL at 17:43

## 2023-06-07 RX ADMIN — Medication 50 MILLILITER(S): at 21:21

## 2023-06-07 RX ADMIN — APIXABAN 2.5 MILLIGRAM(S): 2.5 TABLET, FILM COATED ORAL at 05:55

## 2023-06-07 RX ADMIN — ERTAPENEM SODIUM 100 MILLIGRAM(S): 1 INJECTION, POWDER, LYOPHILIZED, FOR SOLUTION INTRAMUSCULAR; INTRAVENOUS at 18:41

## 2023-06-07 RX ADMIN — SODIUM CHLORIDE 1000 MILLILITER(S): 9 INJECTION INTRAMUSCULAR; INTRAVENOUS; SUBCUTANEOUS at 00:01

## 2023-06-07 NOTE — PROGRESS NOTE ADULT - PROBLEM SELECTOR PLAN 3
Pt w/ TERENCE on CKD likely prerenal and postrenal  Cr 5.89, baseline 1.3  Vogel replaced in ED  Start on NS 100cc/hr Pt w/ TERENCE on CKD likely prerenal VS postrenal?   Cr 5.89 on admission with chronic valencia   as per family valencia since 11/2022  baseline 1.3  only minimal improvement IVF and valencia exchange   CT with B/L hydronephrosis and decompressed bladder  concern for blockage somewhere else in  tract  repeat renal sono in AM   continue IVF

## 2023-06-07 NOTE — DIETITIAN INITIAL EVALUATION ADULT - OTHER INFO
Patient from home, bedbound has HHA (8hrs/7days) admitted for urinary retention. Visited pt. awake & "confused", pt. attempting to remove his placed "Vogel", PCA/nursing at beside, discussed with PCA, reports pt. consumed 50% of lunch tray with feeding assists, needs encouragement, no n/vomiting noted, last BM yesterday. Seen by Speech/Swallow on 06/06/23 & recommendations noted, dosing wt. 190 Lbs on 06/07/23, wound care ongoing, followed by Urology & Nephro/team. Patient from home, bedbound has HHA (8hrs/7days) admitted for urinary retention. Visited pt. awake & "confused", pt. attempting to remove his placed "Vogel", PCA/nursing at beside, discussed with PCA, reports pt. consumed ~40% of lunch tray with feeding assists, needs encouragement, no n/vomiting noted, last BM yesterday. Seen by Speech/Swallow on 06/06/23 & recommendations noted, dosing wt. 190 Lbs on 06/07/23, wound care ongoing, followed by Urology & Nephro/team.  Patient from home, bedbound has HHA (8hrs/7days) admitted for urinary retention. Visited pt. awake & "confused", pt. attempting to remove his placed "Vogel", PCA/nursing at beside, discussed with PCA, reports pt. consumed ~50% of lunch tray with feeding assists, needs encouragement, no n/vomiting noted, last BM yesterday. Seen by Speech/Swallow on 06/06/23 & recommendations noted, dosing wt. 190 Lbs on 06/07/23, wound care ongoing, followed by Urology & Nephro/team.

## 2023-06-07 NOTE — PROGRESS NOTE ADULT - SUBJECTIVE AND OBJECTIVE BOX
Sepsis Week 3 Survey      Responses   [facility] patient discharged from? [facility]   Does the patient have one of the following disease processes/diagnoses(primary or secondary)? Sepsis   Week 3 attempt successful? Yes   Call start time 1006   Call end time 1015   Discharge diagnosis CAP (community acquired pneumonia) Sepsis   Meds reviewed with patient/caregiver? Yes   Is the patient having any side effects they believe may be caused by any medication additions or changes? No   Does the patient have all medications related to this admission filled (includes all antibiotics, inhalers, nebulizers,steroids,etc.) Yes   Is the patient taking all medications as directed (includes completed medication regime)? Yes   Medication comments Pt reports he is tolerating new medications so far   Comments regarding appointments Pt is aware of all his upcoming appts   Does the patient have a primary care provider?  Yes   Comments regarding PCP Patient has seen PCP since discharge   Does the patient have an appointment with their PCP within 7 days of discharge? Yes   Has the patient kept scheduled appointments due by today? N/A   What is the Home health agency?  [facility]   Has home health visited the patient within 72 hours of discharge? Yes   Home health comments Patient doing physical therapy exxercises recommended by PT on his off days    What DME was ordered? New home O2 ordered through [facility].   Has all DME been delivered? Yes   DME comments Home O2 @ 3lpm --sats 93%, he feels improvement in this area as sats were about 89%   Psychosocial issues? No   Did the patient receive a copy of their discharge instructions? Yes   Nursing interventions Reviewed instructions with patient   What is the patient's perception of their health status since discharge? Improving   Nursing interventions Nurse provided patient education   Is the patient/caregiver able to teach back Sepsis? S - Shivering,fever or very cold,  S - Short of breath,  S  - Sleepy, difficult to arouse,confused,  P - Pale or discolored skin   Nursing interventions Nurse provided patient education   Is patient/caregiver able to teach back steps to recovery at home? Set small, achievable goals for return to baseline health,  Rest and regain strength   Is the patient/caregiver able to teach back signs and symptoms of worsening condition: Fever,  Shortness of breath/rapid respiratory rate,  Altered mental status(confusion/coma)   Is the patient/caregiver able to teach back the hierarchy of who to call/visit for symptoms/problems? PCP, Specialist, Home health nurse, Urgent Care, ED, 911 Yes   Week 3 call completed? Yes   Wrap up additional comments Pt feels improved but still weak, tired. SOA has improved,still using O2 at 3 lpm.  Working with PT and doing independent exercises.  He denies edema but unawae of weight gain--not been weighing.  Encouraged daily weights and reviewed CHF weight gain parameters of 3#day/5#week call MD, notify  nurse.            Liya Linares RN   [facility] NEPHROLOGY- PROGRESS NOTE    Patient is a 88yo Male with h/o dementia, bedbound, BPH w/ chronic valencia, afib (on eliquis), HTN that presents to the ED due to decreased urinary output from valencia catheter. Pt a/w TERENCE. Nephrology consulted for Elevated serum creatinine.  CT abd/pelvis with nodular hepatic parenchymal contour suggestive of underlying hepatocellular dz/cirrhosis. Moderate b/l hydro with hydroureter identified to the level of the urinary bladder. Compleletly decompressed bladder by valencia with bladder wall thickening    Hospital Medications: Medications reviewed.  REVIEW OF SYSTEMS: Unable to obtain due to dementia: Pt states "Im OK"    VITALS:  T(F): 97.6 (23 @ 05:15), Max: 99.8 (23 @ 16:30)  HR: 86 (23 @ 09:25)  BP: 114/67 (23 @ 09:25)  RR: 18 (23 @ 09:25)  SpO2: 95% (23 @ 09:25)  Wt(kg): --  Height (cm): 190.5 ( @ 17:15)  Weight (kg): 72.6 ( @ 17:15)  BMI (kg/m2): 20 ( @ 17:15)  BSA (m2): 2 ( @ 17:15)    06 @ 07:01  -   @ 07:00  --------------------------------------------------------  IN: 0 mL / OUT: 1300 mL / NET: -1300 mL      PHYSICAL EXAM:  Gen: NAD,   HEENT: anicteric  Neck: no JVD  Cards: RRR, +S1/S2, no M/G/R  Resp: CTA B/L  GI: soft, NT/ND, NABS  : +valencia urine  Extremities: no LE edema B/L;   Neuro: not answering questins      LABS:      137  |  112<H>  |  113<H>  ----------------------------<  74  3.9   |  18<L>  |  5.06<H>    Ca    8.2<L>      2023 06:05  Phos  4.5     06-07  Mg     1.9     06-07    TPro  8.0  /  Alb  2.5<L>  /  TBili  0.8  /  DBili      /  AST  36  /  ALT  24  /  AlkPhos  118  06-05    Creatinine Trend: 5.06 <--, 5.75 <--, 6.11 <--, 5.89 <--                        9.1    10.31 )-----------( 217      ( 2023 06:05 )             28.1     Urine Studies:  Urinalysis Basic - ( 2023 22:10 )    Color: Yellow / Appearance: Slightly Turbid / S.010 / pH:   Gluc:  / Ketone: Negative  / Bili: Negative / Urobili: Negative   Blood:  / Protein: 100 mg/dL / Nitrite: Negative   Leuk Esterase: Moderate / RBC: 10-25 /HPF / WBC 11-25 /HPF   Sq Epi:  / Non Sq Epi:  / Bacteria: Moderate /HPF      Creatinine, Random Urine: 42 mg/dL ( @ 17:56)  Sodium, Random Urine: 35 mmol/L ( @ 17:56)    RADIOLOGY & ADDITIONAL STUDIES:

## 2023-06-07 NOTE — PROGRESS NOTE ADULT - ASSESSMENT
89M w/ PMH dementia, bedbound, BPH w/ chronic Valencia, Afib (on Eliquis), HTN that presents to the ED due to decreased urinary output from valencia catheter. Catheter exchanged by ED on presentation. Cr 5.75 from baseline 1.30 in November.    - Labs reviewed  - CT images reviewed - hydronephrosis expected in setting of recent urinary retention in patient with history of prior hydronephrosis  - Continue Valencia catheter  - Trend Cr - 5.06 from 5.75  - Consider further imaging (LINDA) in 3 days if Cr does not continue to improve  - Continue IV antibiotics  - Follow up urine and blood cultures (ESBL E. coli)  - Follow up nephrology recommendations

## 2023-06-07 NOTE — PROGRESS NOTE ADULT - PROBLEM SELECTOR PLAN 2
Pt w/ AMS 2/2 delirium in setting of UTI like 2/2 to acute infection and garcia   plan as above   saftey precautions   plan of care discussed with wife Piper

## 2023-06-07 NOTE — DIETITIAN INITIAL EVALUATION ADULT - PERTINENT MEDS FT
MEDICATIONS  (STANDING):  albumin human 25% IVPB 100 milliLiter(s) IV Intermittent every 8 hours  apixaban 2.5 milliGRAM(s) Oral two times a day  ertapenem  IVPB      sodium chloride 0.9%. 1000 milliLiter(s) (125 mL/Hr) IV Continuous <Continuous>    MEDICATIONS  (PRN):

## 2023-06-07 NOTE — DIETITIAN INITIAL EVALUATION ADULT - FACTORS AFF FOOD INTAKE
weakness,/change in mental status/difficulty chewing/difficulty feeding self/difficulty swallowing/difficulty with food procurement/preparation/other (specify)

## 2023-06-07 NOTE — PROGRESS NOTE ADULT - ASSESSMENT
This is an 89 yo M with pmhx of dementia, BPH w/ chronic valencia, afib (on eliquis), HTN that presents to the ED due to decreased urinary output from valencia catheter admitted for urinary retention. 89 yo M from home with pmhx of dementia, BPH w/ chronic valencia, afib (on eliquis), HTN that presents to the ED due to decreased urinary output from valencia catheter. Found with SCR > 5 CT with decompressed bladder and B/L hydro. Valencia exchanged in ED blood and urine cultures sent Urology and nephrology consulted. BCx + ESBL Ecoli started on Ertapenem. Admitted to medicine for acute metabolic encephalopathy 2/2 to sepsis. Hospital course complicated by hypotension overnight s/p fluid boluses and albumin with improvement. Plan to repeat BCx and repeat renal sono to eval hydro.

## 2023-06-07 NOTE — DIETITIAN INITIAL EVALUATION ADULT - PROBLEM SELECTOR PLAN 3
Pt w/ TERENCE on CKD likely prerenal and postrenal  Cr 5.89, baseline 1.3  Vogel replaced in ED  Start on NS 100cc/hr

## 2023-06-07 NOTE — DIETITIAN INITIAL EVALUATION ADULT - ETIOLOGY
UTI w/acute encephalopathy, chronic Vogel, TERENCE, dementia, Afib, multiple comorbidities & advance age

## 2023-06-07 NOTE — DIETITIAN INITIAL EVALUATION ADULT - NS FNS DIET ORDER
Diet, Minced and Moist:   Moderately Thick Liquids (MODTHICKLIQS)  For patients receiving Renal Replacement - No Protein Restr, No Conc K, No Conc Phos, Low Sodium (RENAL) (06-06-23 @ 20:08)

## 2023-06-07 NOTE — PROGRESS NOTE ADULT - PROBLEM SELECTOR PLAN 4
Pt w/ gross blood in valencia catheter, now clearing  resume on eliquis Pt w/ gross blood in valencia catheter  now resolved   H&H stable  continue eliquis  trend CBC daily

## 2023-06-07 NOTE — CONSULT NOTE ADULT - ASSESSMENT
HPI:  This is an 88 yo M from home, bedbound, HHA 8hrs/7days with pmhx of dementia, BPH w/ chronic valencia, afib (on eliquis), HTN that presents to the ED due to decreased urinary output from valencia catheter. Hx obtained from wife, Piper Han , patient unable to provide hx in setting of dementia, she states he is able to talk at baseline, and was AAOx3 last week. He came home from rehab 5 days ago, after being there for 6.5 months ago, last UTI since November, this is not his baseline. Since he got home 5 days ago, he hasn't been himself.    In ED v/s: T: 100.9, HR 97, BP 96/58, RR 18, SPO2: 96% on RA (2023 04:13)    Allergies: No Known Allergies    PAST MEDICAL & SURGICAL HISTORY:  Hematuria  HTN (hypertension)  Arrhythmia  Visual impairment  Dementia  BPH (benign prostatic hyperplasia)  Chronic indwelling Valencia catheter  Atrial fibrillation  TERENCE (acute kidney injury)    SOCIAL HISTORY: [ ] smoking [ ] alcohol [ ] IVDU  FAMILY HISTORY:   no pertinent related medical history    REVIEW OF SYSTEMS:  CONSTITUTIONAL:  No fevers or chills  EYES/ENT:  No visual changes;  No vertigo or throat pain   NECK:  No neck pain or stiffness  RESPIRATORY:  No cough, wheezing, hemoptysis; No shortness of breath  CARDIOVASCULAR:  No chest pain or palpitations  GASTROINTESTINAL:  No abdominal or epigastric pain. No nausea, vomiting, or hematemesis; No diarrhea or constipation. No melena or hematochezia.  GENITOURINARY:  No dysuria, frequency or hematuria  NEUROLOGICAL:  No HA, no numbness or LE weakness  MSK: no back pain, no joint pain  SKIN:  No itching, no skin rash    PHYSICAL EXAM:  VITALS: Vital Signs Last 24 Hrs  T(C): 36.4 (2023 05:15), Max: 37.7 (2023 16:30)  T(F): 97.6 (2023 05:15), Max: 99.8 (2023 16:30)  HR: 86 (2023 09:25) (81 - 101)  BP: 114/67 (2023 09:25) (81/46 - 114/67)  BP(mean): 62 (2023 05:15) (54 - 73)  RR: 18 (2023 09:25) (18 - 18)  SpO2: 95% (2023 09:25) (95% - 99%)    Parameters below as of 2023 09:25 Patient On (Oxygen Delivery Method): room air    Gen: AOx3, NAD, non-toxic, pleasant  HEAD:  Atraumatic, Normocephalic  EYES: PERRLA, conjunctiva and sclera clear  ENT: Moist mucous membranes  NECK: Supple, No JVD  CV: S1+S2 normal, nontachycardic  Resp: Clear bilat, no resp distress, no crackles/wheezes  Abd: Soft, nontender, +BS  Ext: No LE edema, no cyanosis, LE pulses present  : No Valencia, no dysuria  IV/Skin: No thrombophlebitis, no skin rash  Msk: No low back pain, no arthralgias, no joint swelling  Neuro: AAOx3. No sensory deficits, no motor deficits  Psych: no anxiety, no delusional ideas, no suicidal ideation    LABS/DIAGNOSTIC TESTS:                        9.1    10.31 )-----------( 217      ( 2023 06:05 )             28.1     WBC Count: 10.31 K/uL ( @ 06:05)  WBC Count: 14.55 K/uL ( @ 10:47)  WBC Count: 13.65 K/uL ( @ 04:41)  WBC Count: 15.77 K/uL ( @ 19:10)        137  |  112<H>  |  113<H>  ----------------------------<  74  3.9   |  18<L>  |  5.06<H>    Ca    8.2<L>      2023 06:05  Phos  4.5     06-  Mg     1.9     -    TPro  8.0  /  Alb  2.5<L>  /  TBili  0.8  /  DBili  x   /  AST  36  /  ALT  24  /  AlkPhos  118  06-05    Urinalysis Basic - ( 2023 22:10 )  Color: Yellow / Appearance: Slightly Turbid / S.010 / pH: x  Gluc: x / Ketone: Negative  / Bili: Negative / Urobili: Negative   Blood: x / Protein: 100 mg/dL / Nitrite: Negative   Leuk Esterase: Moderate / RBC: 10-25 /HPF / WBC 11-25 /HPF   Sq Epi: x / Non Sq Epi: x / Bacteria: Moderate /HPF    CULTURES:   Culture - Blood (collected 23 @ 23:18)  Source: .Blood Blood  Gram Stain (23 @ 17:00):    Growth in aerobic bottle: Gram Negative Rods    Growth in anaerobic bottle: Gram Negative Rods  Preliminary Report (23 @ 17:01):    Growth in aerobic bottle: Gram Negative Rods    Growth in anaerobic bottle: Gram Negative Rods  Organism: Blood Culture PCR (23 @ 18:29)  Organism: Blood Culture PCR (23 @ 18:29)      Method Type: PCR      -  Escherichia coli: Detec      -  ESBL: Detec      -  CTX-M Resistance Marker: Detec    Culture - Blood (collected 23 @ 23:18)  Source: .Blood Blood  Gram Stain (23 @ 21:44):    Growth in anaerobic bottle: Gram Negative Rods    Growth in aerobic bottle: Gram Negative Rods  Preliminary Report (23 @ 21:44):    Growth in anaerobic bottle: Gram Negative Rods    Growth in aerobic bottle: Gram Negative Rods    RADIOLOGY: < from: CT Abdomen and Pelvis No Cont (23 @ 02:25) >  LOWER CHEST: Cardiomegaly. There is small right pleural effusion. Opacity identified within the posterior/inferior bilateral lower lobes may be related to dependent atelectasis; underlying infiltrate/consolidation cannot be excluded.    LIVER: Normal in size. Nodular hepatic parenchymal contour suggest underlying hepatocellular disease/cirrhosis. No definite focal hepatic masses are identified on this noncontrast evaluation.  BILE DUCTS: Normal caliber.  GALLBLADDER: Hyperdensity is identified within the gallbladder lumen, which may represent a gallstone or sludge. No gallbladder wall thickening.  SPLEEN: Within normal limits.  PANCREAS: Within normal limits.  ADRENALS: Within normal limits.  KIDNEYS/URETERS: Moderate bilateral hydronephrosis, with hydroureter identified to the level of the urinary bladder. Nonspecific stranding of the bilateral perirenal fat. No renal calculi identified. No definite space-occupying lesions of the renal parenchyma noted.  BOWEL:Fecal material scattered throughout the colon. No evidence for mechanical bowel obstruction. No colonic wall thickening. Colonic diverticulosis. The appendix is not visualized with certainty.    URINARY BLADDER: Completely decompressed by an indwelling Valencia catheter.  Bladder wall thickening or the presence of bladder diverticula cannot be   excluded.  REPRODUCTIVE: Prostate unremarkable    PERITONEUM: No ascites. No free intraperitoneal air.  VESSELS: Atherosclerotic changes.  RETROPERITONEUM/LYMPH NODES: No lymphadenopathy.  ABDOMINAL WALL: Within normal limits.  BONES: Degenerative changes. Minimal retrolisthesis of L5 on S1.    IMPRESSION:  Moderate bilateral hydronephrosis, with hydroureter identified to the level of the urinarybladder. Nonspecific stranding of the bilateral perirenal fat.  Completely decompressed by an indwelling Valencia catheter.  Bladder wall thickening or the presence of bladder diverticula cannot be excluded. Nodular hepatic parenchymal contour suggest underlying hepatocellular disease/cirrhosis.    ANTIBIOTICS:  ertapenem  IVPB      WBC Count: 10.31 K/uL ( @ 06:05)  WBC Count: 14.55 K/uL ( @ 10:47)  WBC Count: 13.65 K/uL ( @ 04:41)  WBC Count: 15.77 K/uL ( @ 19:10)    IMPRESSION:  88 yo male from home, bedridden, has HHA, has h/o dementia, CKD, /chronic indwelling Valencia,Afib on Eliquis,HTN who presented to ED for urinary retention/blocked valencia cath with decreased urina output and hematuria, noted worsening renal function.     -Sepsis due to BSI/ UTI  -ESBL E coli bacteremia 2/2 UTI  -CA-UTI  -Obstructive Uropathy- pt has chronic indwelling valencia and multiple bladder diverticula seen in previous imaging, prostate normal in CT, cystoscopy recommended in the past, bladder CA in the differential.   -Obstructive Uropathy BL hydronephrosis known since  as per records, w/chronic indwelling Valencia cath, now with Moderate hydro with hydroureter identified to the level of the urinarybladder.  -TERENCE on CKD likely 2/2 obstructive uropathy  -Dementia     PLAN:      Please reach ID with any questions or concerns  Dr. Darlene Blank  Available in Teams               HPI:  This is an 89 yo M from home, bedbound, HHA 8hrs/7days with pmhx of dementia, BPH w/ chronic valencia, afib (on eliquis), HTN that presents to the ED due to decreased urinary output from valencia catheter. As per pt's wife Ms Piper Han he was at a Rehab Facility for the last 6 months, last admission at [facility] with similar picture at the time with hematuria, urinary retention and bladder diverticula, he did not see Urology OP for cystoscopy and left the hospital with indwelling valencia as per wife she does not know the details of valencia change timing but she believes it was done at the NH. He went home 5 days ago with HHA help and home PT, as per pt's wife the day PT went to assess him on Monday he noticed the Valencia cath was not working properly and he was febrile recommending to call 911. The pt has dementia at [facility] and is hard of hearing but the wife states he is AAOx3 and responsive, he is bedridden due to OA(needs knees replacement). No resp symptoms, no cough, no CP or palpitations, no abd pain, no diarrhea, no skin rash.   In ED v/s: T: 100.9, HR 97, BP 96/58, RR 18, SPO2: 96% on RA (2023 04:13)    Allergies: No Known Allergies    PAST MEDICAL & SURGICAL HISTORY:  Hematuria  HTN (hypertension)  Arrhythmia  Visual impairment  Dementia  BPH (benign prostatic hyperplasia)  Chronic indwelling Valencia catheter  Atrial fibrillation  TERENCE (acute kidney injury)    SOCIAL HISTORY: non smoker, no IVDU, no EtOH  FAMILY HISTORY: no pertinent related medical history    REVIEW OF SYSTEMS: Unable to obtain ROS due to pt's dementia and encephalopathy     PHYSICAL EXAM:  VITALS: Vital Signs Last 24 Hrs  T(C): 36.4 (2023 05:15), Max: 37.7 (2023 16:30)  T(F): 97.6 (2023 05:15), Max: 99.8 (2023 16:30)  HR: 86 (2023 09:25) (81 - 101)  BP: 114/67 (2023 09:25) (81/46 - 114/67)  BP(mean): 62 (2023 05:15) (54 - 73)  RR: 18 (2023 09:25) (18 - 18)  SpO2: 95% (2023 09:25) (95% - 99%)    Parameters below as of 2023 09:25 Patient On (Oxygen Delivery Method): room air    Gen: AOx3, NAD, non-toxic  HEAD:  Atraumatic, Normocephalic  EYES: PERRLA, conjunctiva and sclera clear  ENT: Moist mucous membranes  NECK: Supple, No JVD  CV: S1+S2 normal, no murmurs  Resp: Clear bilat, no resp distress, no crackles/wheezes  Abd: Soft, nontender, +BS  Ext: No LE edema, no cyanosis, LE pulses present  : +Valencia  IV/Skin: No thrombophlebitis, no skin rash  Msk: No low back pain, no arthralgias, no joint swelling  Neuro: AAOx0-1, no focal symptoms   Psych: no anxiety, no delusional ideas, no suicidal ideation    LABS/DIAGNOSTIC TESTS:                        9.1    10.31 )-----------( 217      ( 2023 06:05 )             28.1     WBC Count: 10.31 K/uL ( @ 06:05)  WBC Count: 14.55 K/uL ( @ 10:47)  WBC Count: 13.65 K/uL ( @ 04:41)  WBC Count: 15.77 K/uL ( @ 19:10)    -    137  |  112<H>  |  113<H>  ----------------------------<  74  3.9   |  18<L>  |  5.06<H>    Ca    8.2<L>      2023 06:05  Phos  4.5     06-07  Mg     1.9     06-07    TPro  8.0  /  Alb  2.5<L>  /  TBili  0.8  /  DBili  x   /  AST  36  /  ALT  24  /  AlkPhos  118  06-05    Urinalysis Basic - ( 2023 22:10 )  Color: Yellow / Appearance: Slightly Turbid / S.010 / pH: x  Gluc: x / Ketone: Negative  / Bili: Negative / Urobili: Negative   Blood: x / Protein: 100 mg/dL / Nitrite: Negative   Leuk Esterase: Moderate / RBC: 10-25 /HPF / WBC 11-25 /HPF   Sq Epi: x / Non Sq Epi: x / Bacteria: Moderate /HPF    CULTURES:   Culture - Blood (collected 23 @ 23:18)  Source: .Blood Blood  Gram Stain (23 @ 17:00):    Growth in aerobic bottle: Gram Negative Rods    Growth in anaerobic bottle: Gram Negative Rods  Preliminary Report (23 @ 17:01):    Growth in aerobic bottle: Gram Negative Rods    Growth in anaerobic bottle: Gram Negative Rods  Organism: Blood Culture PCR (23 @ 18:29)  Organism: Blood Culture PCR (23 @ 18:29)      Method Type: PCR      -  Escherichia coli: Detec      -  ESBL: Detec      -  CTX-M Resistance Marker: Detec    Culture - Blood (collected 23 @ 23:18)  Source: .Blood Blood  Gram Stain (23 @ 21:44):    Growth in anaerobic bottle: Gram Negative Rods    Growth in aerobic bottle: Gram Negative Rods  Preliminary Report (23 @ 21:44):    Growth in anaerobic bottle: Gram Negative Rods    Growth in aerobic bottle: Gram Negative Rods    RADIOLOGY: < from: CT Abdomen and Pelvis No Cont (23 @ 02:25) >  LOWER CHEST: Cardiomegaly. There is small right pleural effusion. Opacity identified within the posterior/inferior bilateral lower lobes may be related to dependent atelectasis; underlying infiltrate/consolidation cannot be excluded.    LIVER: Normal in size. Nodular hepatic parenchymal contour suggest underlying hepatocellular disease/cirrhosis. No definite focal hepatic masses are identified on this noncontrast evaluation.  BILE DUCTS: Normal caliber.  GALLBLADDER: Hyperdensity is identified within the gallbladder lumen, which may represent a gallstone or sludge. No gallbladder wall thickening.  SPLEEN: Within normal limits.  PANCREAS: Within normal limits.  ADRENALS: Within normal limits.  KIDNEYS/URETERS: Moderate bilateral hydronephrosis, with hydroureter identified to the level of the urinary bladder. Nonspecific stranding of the bilateral perirenal fat. No renal calculi identified. No definite space-occupying lesions of the renal parenchyma noted.  BOWEL:Fecal material scattered throughout the colon. No evidence for mechanical bowel obstruction. No colonic wall thickening. Colonic diverticulosis. The appendix is not visualized with certainty.    URINARY BLADDER: Completely decompressed by an indwelling Valencia catheter.  Bladder wall thickening or the presence of bladder diverticula cannot be   excluded.  REPRODUCTIVE: Prostate unremarkable    PERITONEUM: No ascites. No free intraperitoneal air.  VESSELS: Atherosclerotic changes.  RETROPERITONEUM/LYMPH NODES: No lymphadenopathy.  ABDOMINAL WALL: Within normal limits.  BONES: Degenerative changes. Minimal retrolisthesis of L5 on S1.    IMPRESSION:  Moderate bilateral hydronephrosis, with hydroureter identified to the level of the urinarybladder. Nonspecific stranding of the bilateral perirenal fat.  Completely decompressed by an indwelling Valencia catheter.  Bladder wall thickening or the presence of bladder diverticula cannot be excluded. Nodular hepatic parenchymal contour suggest underlying hepatocellular disease/cirrhosis.    ANTIBIOTICS:  ertapenem  IVPB      WBC Count: 10.31 K/uL ( @ 06:05)  WBC Count: 14.55 K/uL ( @ 10:47)  WBC Count: 13.65 K/uL ( @ 04:41)  WBC Count: 15.77 K/uL ( @ 19:10)    IMPRESSION:  89 yo male from home, bedridden, has HHA, has h/o dementia, CKD, /chronic indwelling Valencia, Afib on Eliquis, HTN who presented to ED for urinary retention/blocked Valencia cath with decreased urina output and hematuria, noted worsening renal function.     -Sepsis due to BSI/ UTI  -ESBL E coli bacteremia 2/2 UTI  -CA-UTI  -Obstructive Uropathy- pt has chronic indwelling valencia and multiple bladder diverticula seen in previous imaging, prostate normal in CT, cystoscopy recommended in the past, bladder CA in the differential.   -Obstructive Uropathy BL hydronephrosis known since  as per records, w/chronic indwelling Valencia cath, now with Moderate hydro with hydroureter identified to the level of the urinarybladder.  -TERENCE on CKD likely 2/2 obstructive uropathy  -Acute encephalopathy sepsis / Dementia     PLAN:  Ertapenem 500 mg IV daily  Follow cultures and sens  Repeat blood cultures in 24 h post abx  F/up Urology for [place] eval might, monitor urine Output  Monitor renal function  Aspiration precautions  Discussed with pt's wife on the phone  Discussed with medicine attending    Please reach ID with any questions or concerns  Dr. Darlene Blank  Available in Teams

## 2023-06-07 NOTE — PROGRESS NOTE ADULT - SUBJECTIVE AND OBJECTIVE BOX
-*-*- INCOMPLETE  impaired balance/decreased strength NP Note discussed with  primary attending    Patient is a 89y old  Male who presents with a chief complaint of Urinary retention (2023 13:52)      INTERVAL HPI/OVERNIGHT EVENTS: overnight hypotension    MEDICATIONS  (STANDING):  albumin human 25% IVPB 100 milliLiter(s) IV Intermittent every 8 hours  apixaban 2.5 milliGRAM(s) Oral two times a day  ertapenem  IVPB 500 milliGRAM(s) IV Intermittent every 24 hours  ertapenem  IVPB      sodium chloride 0.9%. 1000 milliLiter(s) (125 mL/Hr) IV Continuous <Continuous>    MEDICATIONS  (PRN):      __________________________________________________  REVIEW OF SYSTEMS:  limited due to mental status       Vital Signs Last 24 Hrs  T(C): 36.6 (2023 14:00), Max: 37.7 (2023 16:30)  T(F): 97.9 (2023 14:00), Max: 99.8 (2023 16:30)  HR: 92 (2023 14:00) (81 - 101)  BP: 106/87 (2023 14:00) (81/46 - 114/67)  BP(mean): 62 (2023 05:15) (54 - 73)  RR: 16 (2023 14:00) (16 - 18)  SpO2: 100% (2023 14:00) (95% - 100%)    Parameters below as of 2023 14:00  Patient On (Oxygen Delivery Method): room air  ________________________________________________  PHYSICAL EXAM:  GENERAL: NAD  HEENT: Normocephalic;  conjunctivae and sclerae clear;   NECK : supple  CHEST/LUNG: Clear to ausculitation bilaterally with good air entry   HEART: S1 S2  regular; no murmurs, gallops or rubs  ABDOMEN: Soft, Nontender, Nondistended; Bowel sounds present  : Vogel with cloudy urine  EXTREMITIES: no cyanosis; no edema; no calf tenderness  SKIN: warm and dry; no rash  NERVOUS SYSTEM: a&ox0   _________________________________________________  LABS:                        9.1    10.31 )-----------( 217      ( 2023 06:05 )             28.1     06-07    137  |  112<H>  |  113<H>  ----------------------------<  74  3.9   |  18<L>  |  5.06<H>    Ca    8.2<L>      2023 06:05  Phos  4.5     06-07  Mg     1.9     06-07    TPro  8.0  /  Alb  2.5<L>  /  TBili  0.8  /  DBili  x   /  AST  36  /  ALT  24  /  AlkPhos  118  06-05      Urinalysis Basic - ( 2023 22:10 )    Color: Yellow / Appearance: Slightly Turbid / S.010 / pH: x  Gluc: x / Ketone: Negative  / Bili: Negative / Urobili: Negative   Blood: x / Protein: 100 mg/dL / Nitrite: Negative   Leuk Esterase: Moderate / RBC: 10-25 /HPF / WBC 11-25 /HPF   Sq Epi: x / Non Sq Epi: x / Bacteria: Moderate /HPF      CAPILLARY BLOOD GLUCOSE    RADIOLOGY & ADDITIONAL TESTS: < from: CT Abdomen and Pelvis No Cont (23 @ 02:25) >    ACC: [id number] EXAM:  CT ABDOMEN AND PELVIS   ORDERED BY: JANICE VÁSQUEZ     PROCEDURE DATE:  2023          INTERPRETATION:  INDICATION: Recent urine output.    COMPARISON:  1.   CT ABDOMEN AND PELVIS WITHOUT AND WITH IV CONTRAST 2022 5:36 PM  2.   CT ABDOMEN AND PELVIS 11/3/2022 2:35 PM    CONTRAST/COMPLICATIONS:  IV Contrast: None  Oral Contrast: None  Complications: Not applicable    PROCEDURE:  CT of the Abdomen was performed.  Sagittal and coronal reformats were performed.    FINDINGS:  LOWER CHEST: Cardiomegaly. There is small right pleural effusion. Opacity   identified within the posterior/inferior bilateral lower lobes may be   related to dependent atelectasis; underlying infiltrate/consolidation   cannot be excluded.    LIVER: Normal in size. Nodular hepatic parenchymal contour suggest   underlying hepatocellular disease/cirrhosis. No definite focal hepatic   masses are identified on this noncontrast evaluation.  BILE DUCTS: Normal caliber.  GALLBLADDER: Hyperdensity is identified within the gallbladder lumen,   which may represent a gallstone or sludge. No gallbladder wall thickening.  SPLEEN: Within normal limits.  PANCREAS: Within normal limits.  ADRENALS: Within normal limits.  KIDNEYS/URETERS: Moderate bilateral hydronephrosis, with hydroureter   identified to the level of the urinary bladder. Nonspecific stranding of   the bilateral perirenal fat. No renal calculi identified. No definite   space-occupying lesions of the renal parenchyma noted.  BOWEL:Fecal material scattered throughout the colon. No evidence for   mechanical bowel obstruction. No colonic wall thickening. Colonic   diverticulosis. The appendix is not visualized with certainty.    URINARY BLADDER: Completely decompressed by an indwelling Vogel catheter.    Bladder wall thickening or the presence of bladder diverticula cannot be   excluded.  REPRODUCTIVE: Prostate unremarkable    PERITONEUM: No ascites. No free intraperitoneal air.  VESSELS: Atherosclerotic changes.  RETROPERITONEUM/LYMPH NODES: No lymphadenopathy.  ABDOMINAL WALL: Within normal limits.  BONES: Degenerative changes. Minimal retrolisthesis of L5 on S1.    IMPRESSION:  Moderate bilateral hydronephrosis, with hydroureter   identified to the level of the urinarybladder. Nonspecific stranding of   the bilateral perirenal fat.  Completely decompressed by an indwelling   Vogel catheter.  Bladder wall thickening or the presence of bladder   diverticula cannot be excluded. Nodular hepatic parenchymal contour   suggest underlying hepatocellular disease/cirrhosis.    Preliminary report provided by Dr. Nino of [facility].    --- End of Report ---    < end of copied text >      Imaging Personally Reviewed:  YES    Consultant(s) Notes Reviewed:   YES    Care Discussed with Consultants: ROVERTO Chase      Plan of care was discussed with patient and /or primary care giver; all questions and concerns were addressed and care was aligned with patient's wishes.

## 2023-06-07 NOTE — DIETITIAN INITIAL EVALUATION ADULT - PERTINENT LABORATORY DATA
06-07    137  |  112<H>  |  113<H>  ----------------------------<  74  3.9   |  18<L>  |  5.06<H>    Ca    8.2<L>      07 Jun 2023 06:05  Phos  4.5     06-07  Mg     1.9     06-07    TPro  8.0  /  Alb  2.5<L>  /  TBili  0.8  /  DBili  x   /  AST  36  /  ALT  24  /  AlkPhos  118  06-05

## 2023-06-07 NOTE — PROGRESS NOTE ADULT - PROBLEM SELECTOR PLAN 7
c/w [facility]iqu[facility] patient recently discharged from [facility]  discharged home on Thursday 6/1   lives home with wife   wife denis updated on plan of care all questions answered and support provided   repeat BCX in AM   f.u repeat renal sono   remains on IV ertapenem

## 2023-06-07 NOTE — DIETITIAN INITIAL EVALUATION ADULT - SIGNS/SYMPTOMS
Inadequate oral intake x5days PTA, s/p SLP w/ <50% intake of minced/moist & stage II wound Inadequate oral intake x5days PTA, s/p SLP w/<75% intake of minced/moist & stage II wound

## 2023-06-07 NOTE — PROGRESS NOTE ADULT - SUBJECTIVE AND OBJECTIVE BOX
Subjective  Hypotensive overnight to 83/45.    Objective    Vital signs  T(F): , Max: 99.8 (06-06-23 @ 16:30)  HR: 97 (06-07-23 @ 05:15)  BP: 92/47 (06-07-23 @ 05:15)  SpO2: 97% (06-07-23 @ 05:15)  Wt(kg): --    Output     OUT:    Indwelling Catheter - Urethral (mL): 1300 mL  Total OUT: 1300 mL    Total NET: -1300 mL          Gen: NAD  Abd: soft, nontender, nondistended  : valencia secured in place, draining cloudy yellow urine    Labs      06-07 @ 06:05    WBC 10.31 / Hct 28.1  / SCr 5.06     06-06 @ 10:47    WBC 14.55 / Hct 29.4  / SCr 5.75         Culture - Blood (collected 06-05-23 @ 23:18)  Source: .Blood Blood  Gram Stain (06-06-23 @ 17:00):    Growth in aerobic bottle: Gram Negative Rods    Growth in anaerobic bottle: Gram Negative Rods  Preliminary Report (06-06-23 @ 17:01):    Growth in aerobic bottle: Gram Negative Rods    Growth in anaerobic bottle: Gram Negative Rods    ***Blood Panel PCR results on this specimen are available    approximately 3 hours after the Gram stain result.***    Gram stain, PCR, and/or culture results may not always    correspond due to difference in methodologies.    ************************************************************    This PCR assay was performed by multiplex PCR. This    Assay tests for 66 bacterial and resistance gene targets.    Please refer to the [facility] test directory    at [email or web address] for details.  Organism: Blood Culture PCR (06-06-23 @ 18:29)  Organism: Blood Culture PCR (06-06-23 @ 18:29)      Method Type: PCR      -  Escherichia coli: Detec      -  ESBL: Detec      -  CTX-M Resistance Marker: Detec    Culture - Blood (collected 06-05-23 @ 23:18)  Source: .Blood Blood  Gram Stain (06-06-23 @ 21:44):    Growth in anaerobic bottle: Gram Negative Rods    Growth in aerobic bottle: Gram Negative Rods  Preliminary Report (06-06-23 @ 21:44):    Growth in anaerobic bottle: Gram Negative Rods    Growth in aerobic bottle: Gram Negative Rods

## 2023-06-07 NOTE — PROGRESS NOTE ADULT - ASSESSMENT
Patient is a 88yo Male with h/o dementia, bedbound, BPH w/ chronic valencia, afib (on eliquis), HTN that presents to the ED due to decreased urinary output from valencia catheter. Pt a/w TERENCE. Nephrology consulted for Elevated serum creatinine.  CT abd/pelvis with nodular hepatic parenchymal contour suggestive of underlying hepatocellular dz/cirrhosis. Moderate b/l hydro with hydroureter identified to the level of the urinary bladder. Compleletly decompressed bladder by valencia with bladder wall thickening    1. TERENCE- unknown baseline SCr. Previous SCr 1.66 on 5/29/23 & SCr 1.71 on 5/22/23. TERENCE in the setting of obstructive uropathy with ?cirrhosis with sepsis 2/2 UTI/ relative hypotension. Urology following.    UA active in the setting of infection. FeNa 3.6%; ?obstructive. Mild improvement in renal function with good urine o/p.   Recc to repeat Renal US in 1-2 days to monitor resolution of hydro; if persistent recc NT. Strict I/Os. Avoid nephrotoxins/ NSAIDs/ RCA. Monitor BMP.  2. b/l hydronephrosis- Moderate b/l hydro with hydroureter with compressed bladder by valencia. Urology following. Recc good bowel regimen  3. Sepsis 2/2 UTI- (leukocytosis with tachycardia) +UA. Pt on Invanz. UCx pending. BCx +Ecoli  4. Hypotension- BP low normal with ?cirrhosis. c/w albumin gtt 25% in 100ml q 8hrs x 48 hrs. Consider midodrine prn. Monitor BP    [facility] NEPHROLOGY  Avelino Fair M.D.  Harrison Barajas D.O.  Carolann Regalado M.D.  Janine Ackerman, [place], ANP-C  [phone]  Fax: [phone] [place] [address], [address]  [place] [address]

## 2023-06-07 NOTE — PROGRESS NOTE ADULT - PROBLEM SELECTOR PLAN 1
Pt w/ sepsis (HR 97, T 100.9, WBC 15k) 2/2 UTI   Vogel changed in ED  Lactate 2.1 > f/u repeat lactate until resolution  Place on NS 100cc/hr  Pt is lethargic, minimally responsive. Will keep NPO  Start on CTX  f/u Ucx and Bcx septic on admission   hypotensive overnight  s/p IVF   BCX  + ESBL Ecoli   source likely UTI   valencia exchanged in ED   abx changed to ertapenem   will repeat BCX in AM   ROVERTO wilhelm

## 2023-06-07 NOTE — CHART NOTE - NSCHARTNOTEFT_GEN_A_CORE
EVENT: Hypotension B/P 83/45    Informed by nurse regarding patient with  BP __. Patient seen and evaluated patient at bedside. Patient A&Ox4 - denies lightheadedness, dizziness, chest pain, palpitations, SOB, N/V/D, blurred vision.      Vital Signs Last 24 Hrs  T(C): 36.2 (06 Jun 2023 20:33), Max: 37.7 (06 Jun 2023 16:30)  T(F): 97.2 (06 Jun 2023 20:33), Max: 99.8 (06 Jun 2023 16:30)  HR: 101 (06 Jun 2023 23:37) (84 - 101)  BP: 83/45 (06 Jun 2023 23:37) (83/45 - 97/61)  BP(mean): 54 (06 Jun 2023 23:37) (54 - 73)  RR: 18 (06 Jun 2023 20:33) (18 - 18)  SpO2: 96% (06 Jun 2023 20:33) (96% - 99%)    Parameters below as of 06 Jun 2023 20:33  Patient On (Oxygen Delivery Method): room air      Plan:  #Hypotension  500 cc NS bolus  Will continue to monitor patient closely. EVENT: Hypotension B/P 83/45    HPI: 88 yo M with pmhx of dementia, BPH w/ chronic valencia, afib (on eliquis), HTN that presents to the ED due to decreased urinary output from Valencia catheter admitted for Complicated UTI/ CAUTI    Informed by nurse regarding patient with  BP 83/45. Patient seen and evaluated patient at bedside. Patient A&Ox1 unable to obtain ROS. Patient admitted for complicated UTI/CAUTI; sepsis work-up, on antibiotic, and continuous IVF. Patient hypotensive with blood pressure systolic 83-97 and diastolic 45-61. Attempted several times to reach emergency contact patient's spouse without success, messages left on answerign service with NP contact info. Patient DNR/DNI    Vital Signs Last 24 Hrs  T(C): 36.2 (06 Jun 2023 20:33), Max: 37.7 (06 Jun 2023 16:30)  T(F): 97.2 (06 Jun 2023 20:33), Max: 99.8 (06 Jun 2023 16:30)  HR: 101 (06 Jun 2023 23:37) (84 - 101)  BP: 83/45 (06 Jun 2023 23:37) (83/45 - 97/61)  BP(mean): 54 (06 Jun 2023 23:37) (54 - 73)  RR: 18 (06 Jun 2023 20:33) (18 - 18)  SpO2: 96% (06 Jun 2023 20:33) (96% - 99%)    Parameters below as of 06 Jun 2023 20:33  Patient On (Oxygen Delivery Method): room air    FOCUSED PHYSICAL EXAM:    Gen: NAD, afebrile   Neck: no JVD  Cards: RRR, +S1/S2, no M/G/R  Resp: CTA B/L  GI: soft, NT/ND  : +valencia dark urine  Extremities: no LE edema B/L, warm   Vasc: capillary refill less than 3 sec, no JVD  Neuro: Alert and oriented x 1  Skin: intact,  decreased skin turgor                          9.6    14.55 )-----------( 226      ( 06 Jun 2023 10:47 )             29.4     06-06    133<L>  |  105  |  110<H>  ----------------------------<  89  4.9   |  20<L>  |  5.75<H>    Ca    8.7      06 Jun 2023 10:47  Phos  4.1     06-06  Mg     1.9     06-06    TPro  8.0  /  Alb  2.5<L>  /  TBili  0.8  /  DBili  x   /  AST  36  /  ALT  24  /  AlkPhos  118  06-05      PROBLEM:   #Hypotension  Plan:  2 liters Normal saline bolus - post bolus repeat blood pressure 92/49  Monitor for fluid overload  continue IVF  Continue albumin gtt 25%   Continue antibiotics therapy  ICU consulted - will continue to monitor patient on floor, patient responded to IV bolus   Will continue to monitor patient closely.    Will f/u with primary team in AM - reconsult ICU if patient remain hypotension to consider pressors  F/u with family regarding treatment plan and GOC

## 2023-06-07 NOTE — PROGRESS NOTE ADULT - PROBLEM SELECTOR PLAN 5
on terazosin  will hold PO meds in setting of lethargy rate controlled  HR 80-90's  holding BB in the setting of hypotension

## 2023-06-07 NOTE — DIETITIAN INITIAL EVALUATION ADULT - NSFNSADHERENCEPTAFT_GEN_A_CORE
Per chart, "Pt. came home from rehab 5 days ago, after being there for 6.5 months ago" Per chart, "Pt. came home from rehab 5 days ago, after being there for 6.5 months ago. Since he got home 5 days ago, he hasn't been himself"

## 2023-06-08 LAB
-  AMIKACIN: SIGNIFICANT CHANGE UP
-  AMOXICILLIN/CLAVULANIC ACID: SIGNIFICANT CHANGE UP
-  AMOXICILLIN/CLAVULANIC ACID: SIGNIFICANT CHANGE UP
-  AMPICILLIN/SULBACTAM: SIGNIFICANT CHANGE UP
-  AMPICILLIN: SIGNIFICANT CHANGE UP
-  AZTREONAM: SIGNIFICANT CHANGE UP
-  CEFAZOLIN: SIGNIFICANT CHANGE UP
-  CEFEPIME: SIGNIFICANT CHANGE UP
-  CEFTRIAXONE: SIGNIFICANT CHANGE UP
-  CEFUROXIME: SIGNIFICANT CHANGE UP
-  CEFUROXIME: SIGNIFICANT CHANGE UP
-  CIPROFLOXACIN: SIGNIFICANT CHANGE UP
-  ERTAPENEM: SIGNIFICANT CHANGE UP
-  GENTAMICIN: SIGNIFICANT CHANGE UP
-  IMIPENEM: SIGNIFICANT CHANGE UP
-  LEVOFLOXACIN: SIGNIFICANT CHANGE UP
-  MEROPENEM: SIGNIFICANT CHANGE UP
-  NITROFURANTOIN: SIGNIFICANT CHANGE UP
-  NITROFURANTOIN: SIGNIFICANT CHANGE UP
-  PIPERACILLIN/TAZOBACTAM: SIGNIFICANT CHANGE UP
-  TOBRAMYCIN: SIGNIFICANT CHANGE UP
-  TRIMETHOPRIM/SULFAMETHOXAZOLE: SIGNIFICANT CHANGE UP
ANION GAP SERPL CALC-SCNC: 10 MMOL/L — SIGNIFICANT CHANGE UP (ref 5–17)
BUN SERPL-MCNC: 102 MG/DL — HIGH (ref 7–18)
CALCIUM SERPL-MCNC: 8.5 MG/DL — SIGNIFICANT CHANGE UP (ref 8.4–10.5)
CHLORIDE SERPL-SCNC: 117 MMOL/L — HIGH (ref 96–108)
CO2 SERPL-SCNC: 16 MMOL/L — LOW (ref 22–31)
CREAT SERPL-MCNC: 4.16 MG/DL — HIGH (ref 0.5–1.3)
CRP SERPL-MCNC: 151 MG/L — HIGH
CULTURE RESULTS: SIGNIFICANT CHANGE UP
EGFR: 13 ML/MIN/1.73M2 — LOW
ERYTHROCYTE [SEDIMENTATION RATE] IN BLOOD: 100 MM/HR — HIGH (ref 0–20)
GLUCOSE SERPL-MCNC: 106 MG/DL — HIGH (ref 70–99)
HCT VFR BLD CALC: 26 % — LOW (ref 39–50)
HGB BLD-MCNC: 8.6 G/DL — LOW (ref 13–17)
MCHC RBC-ENTMCNC: 25.7 PG — LOW (ref 27–34)
MCHC RBC-ENTMCNC: 33.1 GM/DL — SIGNIFICANT CHANGE UP (ref 32–36)
MCV RBC AUTO: 77.6 FL — LOW (ref 80–100)
METHOD TYPE: SIGNIFICANT CHANGE UP
NRBC # BLD: 0 /100 WBCS — SIGNIFICANT CHANGE UP (ref 0–0)
ORGANISM # SPEC MICROSCOPIC CNT: SIGNIFICANT CHANGE UP
PLATELET # BLD AUTO: 239 K/UL — SIGNIFICANT CHANGE UP (ref 150–400)
POTASSIUM SERPL-MCNC: 3.6 MMOL/L — SIGNIFICANT CHANGE UP (ref 3.5–5.3)
POTASSIUM SERPL-SCNC: 3.6 MMOL/L — SIGNIFICANT CHANGE UP (ref 3.5–5.3)
RBC # BLD: 3.35 M/UL — LOW (ref 4.2–5.8)
RBC # FLD: 17.4 % — HIGH (ref 10.3–14.5)
SODIUM SERPL-SCNC: 143 MMOL/L — SIGNIFICANT CHANGE UP (ref 135–145)
SPECIMEN SOURCE: SIGNIFICANT CHANGE UP
WBC # BLD: 9.22 K/UL — SIGNIFICANT CHANGE UP (ref 3.8–10.5)
WBC # FLD AUTO: 9.22 K/UL — SIGNIFICANT CHANGE UP (ref 3.8–10.5)

## 2023-06-08 PROCEDURE — 76775 US EXAM ABDO BACK WALL LIM: CPT | Mod: 26

## 2023-06-08 PROCEDURE — 99232 SBSQ HOSP IP/OBS MODERATE 35: CPT

## 2023-06-08 PROCEDURE — 99233 SBSQ HOSP IP/OBS HIGH 50: CPT | Mod: FS

## 2023-06-08 RX ORDER — PANTOPRAZOLE SODIUM 20 MG/1
40 TABLET, DELAYED RELEASE ORAL
Refills: 0 | Status: DISCONTINUED | OUTPATIENT
Start: 2023-06-08 | End: 2023-06-16

## 2023-06-08 RX ORDER — SODIUM CHLORIDE 9 MG/ML
1000 INJECTION, SOLUTION INTRAVENOUS
Refills: 0 | Status: DISCONTINUED | OUTPATIENT
Start: 2023-06-08 | End: 2023-06-09

## 2023-06-08 RX ORDER — POLYETHYLENE GLYCOL 3350 17 G/17G
17 POWDER, FOR SOLUTION ORAL DAILY
Refills: 0 | Status: DISCONTINUED | OUTPATIENT
Start: 2023-06-08 | End: 2023-06-16

## 2023-06-08 RX ORDER — SENNA PLUS 8.6 MG/1
2 TABLET ORAL AT BEDTIME
Refills: 0 | Status: DISCONTINUED | OUTPATIENT
Start: 2023-06-08 | End: 2023-06-16

## 2023-06-08 RX ADMIN — APIXABAN 2.5 MILLIGRAM(S): 2.5 TABLET, FILM COATED ORAL at 05:43

## 2023-06-08 RX ADMIN — SENNA PLUS 2 TABLET(S): 8.6 TABLET ORAL at 21:31

## 2023-06-08 RX ADMIN — ERTAPENEM SODIUM 100 MILLIGRAM(S): 1 INJECTION, POWDER, LYOPHILIZED, FOR SOLUTION INTRAMUSCULAR; INTRAVENOUS at 18:50

## 2023-06-08 RX ADMIN — Medication 50 MILLILITER(S): at 13:57

## 2023-06-08 RX ADMIN — Medication 50 MILLILITER(S): at 05:41

## 2023-06-08 RX ADMIN — APIXABAN 2.5 MILLIGRAM(S): 2.5 TABLET, FILM COATED ORAL at 18:50

## 2023-06-08 RX ADMIN — SODIUM CHLORIDE 65 MILLILITER(S): 9 INJECTION, SOLUTION INTRAVENOUS at 09:42

## 2023-06-08 NOTE — PROGRESS NOTE ADULT - PROBLEM SELECTOR PLAN 4
- P/w gross blood in valencia catheter  - Now resolved   - H&H stable  - Continue to monitor CBC in AM-f/u results - P/w gross blood in valencia catheter  - Now resolved   - H&H stable  - Continue to monitor CBC in AM-f/u results  - Uro-Dr. Chris consulted, appreciated

## 2023-06-08 NOTE — PROGRESS NOTE ADULT - SUBJECTIVE AND OBJECTIVE BOX
[facility] NEPHROLOGY- PROGRESS NOTE    Patient is a 89yo Male with h/o dementia, bedbound, BPH w/ chronic valencia, afib (on eliquis), HTN that presents to the ED due to decreased urinary output from valencia catheter. Pt a/w TERENCE. Nephrology consulted for Elevated serum creatinine.  CT abd/pelvis with nodular hepatic parenchymal contour suggestive of underlying hepatocellular dz/cirrhosis. Moderate b/l hydro with hydroureter identified to the level of the urinary bladder. Compleletly decompressed bladder by valencia with bladder wall thickening    Hospital Medications: Medications reviewed.  REVIEW OF SYSTEMS: Unreliable due to dementia: Pt states "Im Up and down"    VITALS:  T(F): 98.1 (23 @ 05:05), Max: 98.1 (23 @ 05:05)  HR: 100 (23 @ 05:05)  BP: 120/64 (23 @ 05:05)  RR: 18 (23 @ 05:05)  SpO2: 97% (23 @ 05:05)  Wt(kg): --     @ 07:01  -   @ 07:00  --------------------------------------------------------  IN: 250 mL / OUT: 1900 mL / NET: -1650 mL      PHYSICAL EXAM:  Gen: NAD,   HEENT: anicteric  Neck: no JVD  Cards: RRR, +S1/S2, no M/G/R  Resp: CTA B/L  GI: soft, NT/ND, NABS  : +valencia with pus in tubing  Extremities: no LE edema B/L;     LABS:      143  |  117<H>  |  102<H>  ----------------------------<  106<H>  3.6   |  16<L>  |  4.16<H>    Ca    8.5      2023 06:39  Phos  4.5     -  Mg     1.9           Creatinine Trend: 4.16 <--, 5.06 <--, 5.75 <--, 6.11 <--, 5.89 <--                        8.6    9.22  )-----------( 239      ( 2023 06:39 )             26.0     Urine Studies:  Urinalysis Basic - ( 2023 22:10 )    Color: Yellow / Appearance: Slightly Turbid / S.010 / pH:   Gluc:  / Ketone: Negative  / Bili: Negative / Urobili: Negative   Blood:  / Protein: 100 mg/dL / Nitrite: Negative   Leuk Esterase: Moderate / RBC: 10-25 /HPF / WBC 11-25 /HPF   Sq Epi:  / Non Sq Epi:  / Bacteria: Moderate /HPF      Creatinine, Random Urine: 42 mg/dL ( @ 17:56)  Sodium, Random Urine: 35 mmol/L ( @ 17:56)

## 2023-06-08 NOTE — PROGRESS NOTE ADULT - PROBLEM SELECTOR PLAN 7
- Pt recently discharged from [facility], discharged home on Thursday 6/1, lives home with wife

## 2023-06-08 NOTE — PROGRESS NOTE ADULT - PROBLEM SELECTOR PLAN 2
- Multifactorial 2/2 to acute infection vs. TERENCE vs. worsening dementia   - C/w Supportive care  - See above

## 2023-06-08 NOTE — PROGRESS NOTE ADULT - ASSESSMENT
Subjective:    REVIEW OF SYSTEMS: Unable to obtain due to pt's dementia    PE:  Vital Signs Last 24 Hrs  T(C): 36.7 (08 Jun 2023 05:05), Max: 36.7 (07 Jun 2023 20:22)  T(F): 98.1 (08 Jun 2023 05:05), Max: 98.1 (08 Jun 2023 05:05)  HR: 100 (08 Jun 2023 05:05) (88 - 100)  BP: 120/64 (08 Jun 2023 05:05) (106/87 - 120/64)  RR: 18 (08 Jun 2023 05:05) (16 - 18)  SpO2: 97% (08 Jun 2023 05:05) (97% - 100%)    Parameters below as of 08 Jun 2023 05:05 Patient On (Oxygen Delivery Method): room air    Gen: AOx3, NAD, non-toxic, pleasant  HEAD:  Atraumatic, Normocephalic  EYES: PERRLA, conjunctiva and sclera clear  ENT: Moist mucous membranes  NECK: Supple, No JVD  CV: S1+S2 + irregular Rhythm    Resp: Clear bilat, no resp distress, no crackles/wheezes  Abd: Soft, nontender, +BS  Ext: No LE edema, no cyanosis, pulses +  : + Valencia with yellow urine  IV/Skin: No thrombophlebitis  Msk: no joint swelling  Neuro: AAOx0-1 no focal signs    LABS/DIAGNOSTIC TESTS:                        8.6    9.22  )-----------( 239      ( 08 Jun 2023 06:39 )             26.0     WBC Count: 9.22 K/uL (06-08 @ 06:39)  WBC Count: 10.31 K/uL (06-07 @ 06:05)  WBC Count: 14.55 K/uL (06-06 @ 10:47)  WBC Count: 13.65 K/uL (06-06 @ 04:41)  WBC Count: 15.77 K/uL (06-05 @ 19:10)    06-08    143  |  117<H>  |  102<H>  ----------------------------<  106<H>  3.6   |  16<L>  |  4.16<H>    Ca    8.5      08 Jun 2023 06:39  Phos  4.5     06-07  Mg     1.9     06-07    CULTURES:   Culture - Blood (collected 06-07-23 @ 06:10)  Source: .Blood Blood-Peripheral  Preliminary Report (06-08-23 @ 13:02):    No growth to date.    Culture - Blood (collected 06-07-23 @ 06:05)  Source: .Blood Blood-Venous  Preliminary Report (06-08-23 @ 13:02):    No growth to date.    Culture - Blood (collected 06-05-23 @ 23:18)  Source: .Blood Blood  Gram Stain (06-06-23 @ 17:00):    Growth in aerobic bottle: Gram Negative Rods    Growth in anaerobic bottle: Gram Negative Rods  Final Report (06-08-23 @ 10:09):    Growth in aerobic and anaerobic bottles: Escherichia coli ESBL    Organism: Blood Culture PCR  Escherichia coli ESBL (06-08-23 @ 10:09)  Organism: Escherichia coli ESBL (06-08-23 @ 10:09)      Method Type: KRISTI      -  Amikacin: S <=16      -  Ampicillin: R >16 These ampicillin results predict results for amoxicillin      -  Ampicillin/Sulbactam: R >16/8 Enterobacter, Klebsiella aerogenes, Citrobacter, and Serratia may develop resistance during prolonged therapy (3-4 days)      -  Aztreonam: R >16      -  Cefazolin: R >16 Enterobacter, Klebsiella aerogenes, Citrobacter, and Serratia may develop resistance during prolonged therapy (3-4 days)      -  Cefepime: R >16      -  Ceftriaxone: R >32 Enterobacter, Klebsiella aerogenes, Citrobacter, and Serratia may develop resistance during prolonged therapy      -  Ciprofloxacin: R >2      -  Ertapenem: S <=0.5      -  Gentamicin: R >8      -  Imipenem: S <=1      -  Levofloxacin: R >4      -  Meropenem: S <=1      -  Piperacillin/Tazobactam: R 64      -  Tobramycin: R >8      -  Trimethoprim/Sulfamethoxazole: S <=0.5/9.5  Organism: Blood Culture PCR (06-08-23 @ 10:09)      Method Type: PCR      -  Escherichia coli: Detec      -  ESBL: Detec      -  CTX-M Resistance Marker: Detec    Culture - Blood (collected 06-05-23 @ 23:18)  Source: .Blood Blood  Gram Stain (06-06-23 @ 21:44):    Growth in anaerobic bottle: Gram Negative Rods    Growth in aerobic bottle: Gram Negative Rods  Final Report (06-08-23 @ 10:10):    Growth in aerobic and anaerobic bottles: Escherichia coli ESBL    See previous culture [id number]    Culture - Urine (collected 06-05-23 @ 22:10)  Source: Clean Catch Clean Catch (Midstream)  Preliminary Report (06-07-23 @ 14:59):    >100,000 CFU/ml Escherichia coli    ANTIBIOTICS:  ertapenem  IVPB 500 every 24 hours  ertapenem  IVPB      IMPRESSION:  88 yo male from home, bedridden, has HHA, has h/o dementia, CKD, /chronic indwelling Valencia, Afib on Eliquis, HTN who presented to ED for urinary retention/blocked Valencia cath with decreased urina output and hematuria, noted worsening renal function.     -Sepsis due to BSI/ UTI 2/2 ESBL E coli  -ESBL E coli bacteremia 2/2 UTI (BC 6/5 +; BC 6/7 NGTD)  -CA-UTI due to ESBL E coli  -Obstructive Uropathy- pt has chronic indwelling valencia and multiple bladder diverticula seen in previous imaging, prostate normal in CT, cystoscopy recommended in the past, bladder CA in the differential.   -Obstructive Uropathy BL hydronephrosis known since 2022 as per records, w/chronic indwelling Valencia cath, now with Moderate hydro with hydroureter identified to the level of the urinarybladder.  -TERENCE on CKD likely 2/2 obstructive uropathy.  -Acute encephalopathy sepsis / Dementia.     PLAN:  Ertapenem 500 mg IV daily   Follow cultures and sens  F/up Urology for [place] eval might, monitor urine Output  Monitor renal function, follow up repeat Renal US to assess hydroureter and hydronephrosis   Aspiration precautions  Discussed with medicine attending    Please reach ID with any questions or concerns  Dr. Darlene lBank  Available in Teams               Subjective: NAD, afebrile, clinically stable.     REVIEW OF SYSTEMS: Unable to obtain due to pt's dementia    PE:  Vital Signs Last 24 Hrs  T(C): 36.7 (08 Jun 2023 05:05), Max: 36.7 (07 Jun 2023 20:22)  T(F): 98.1 (08 Jun 2023 05:05), Max: 98.1 (08 Jun 2023 05:05)  HR: 100 (08 Jun 2023 05:05) (88 - 100)  BP: 120/64 (08 Jun 2023 05:05) (106/87 - 120/64)  RR: 18 (08 Jun 2023 05:05) (16 - 18)  SpO2: 97% (08 Jun 2023 05:05) (97% - 100%)    Parameters below as of 08 Jun 2023 05:05 Patient On (Oxygen Delivery Method): room air    Gen: AOx3, NAD, non-toxic, pleasant  HEAD:  Atraumatic, Normocephalic  EYES: PERRLA, conjunctiva and sclera clear  ENT: Moist mucous membranes  NECK: Supple, No JVD  CV: S1+S2 + irregular Rhythm    Resp: Clear bilat, no resp distress, no crackles/wheezes  Abd: Soft, nontender, +BS  Ext: No LE edema, no cyanosis, pulses +  : + Valencia with yellow urine  IV/Skin: No thrombophlebitis  Msk: no joint swelling  Neuro: AAOx0-1 no focal signs    LABS/DIAGNOSTIC TESTS:                        8.6    9.22  )-----------( 239      ( 08 Jun 2023 06:39 )             26.0     WBC Count: 9.22 K/uL (06-08 @ 06:39)  WBC Count: 10.31 K/uL (06-07 @ 06:05)  WBC Count: 14.55 K/uL (06-06 @ 10:47)  WBC Count: 13.65 K/uL (06-06 @ 04:41)  WBC Count: 15.77 K/uL (06-05 @ 19:10)    06-08    143  |  117<H>  |  102<H>  ----------------------------<  106<H>  3.6   |  16<L>  |  4.16<H>    Ca    8.5      08 Jun 2023 06:39  Phos  4.5     06-07  Mg     1.9     06-07    CULTURES:   Culture - Blood (collected 06-07-23 @ 06:10)  Source: .Blood Blood-Peripheral  Preliminary Report (06-08-23 @ 13:02):    No growth to date.    Culture - Blood (collected 06-07-23 @ 06:05)  Source: .Blood Blood-Venous  Preliminary Report (06-08-23 @ 13:02):    No growth to date.    Culture - Blood (collected 06-05-23 @ 23:18)  Source: .Blood Blood  Gram Stain (06-06-23 @ 17:00):    Growth in aerobic bottle: Gram Negative Rods    Growth in anaerobic bottle: Gram Negative Rods  Final Report (06-08-23 @ 10:09):    Growth in aerobic and anaerobic bottles: Escherichia coli ESBL    Organism: Blood Culture PCR  Escherichia coli ESBL (06-08-23 @ 10:09)  Organism: Escherichia coli ESBL (06-08-23 @ 10:09)      Method Type: KRISTI      -  Amikacin: S <=16      -  Ampicillin: R >16 These ampicillin results predict results for amoxicillin      -  Ampicillin/Sulbactam: R >16/8 Enterobacter, Klebsiella aerogenes, Citrobacter, and Serratia may develop resistance during prolonged therapy (3-4 days)      -  Aztreonam: R >16      -  Cefazolin: R >16 Enterobacter, Klebsiella aerogenes, Citrobacter, and Serratia may develop resistance during prolonged therapy (3-4 days)      -  Cefepime: R >16      -  Ceftriaxone: R >32 Enterobacter, Klebsiella aerogenes, Citrobacter, and Serratia may develop resistance during prolonged therapy      -  Ciprofloxacin: R >2      -  Ertapenem: S <=0.5      -  Gentamicin: R >8      -  Imipenem: S <=1      -  Levofloxacin: R >4      -  Meropenem: S <=1      -  Piperacillin/Tazobactam: R 64      -  Tobramycin: R >8      -  Trimethoprim/Sulfamethoxazole: S <=0.5/9.5  Organism: Blood Culture PCR (06-08-23 @ 10:09)      Method Type: PCR      -  Escherichia coli: Detec      -  ESBL: Detec      -  CTX-M Resistance Marker: Detec    Culture - Blood (collected 06-05-23 @ 23:18)  Source: .Blood Blood  Gram Stain (06-06-23 @ 21:44):    Growth in anaerobic bottle: Gram Negative Rods    Growth in aerobic bottle: Gram Negative Rods  Final Report (06-08-23 @ 10:10):    Growth in aerobic and anaerobic bottles: Escherichia coli ESBL    See previous culture [id number]    Culture - Urine (collected 06-05-23 @ 22:10)  Source: Clean Catch Clean Catch (Midstream)  Preliminary Report (06-07-23 @ 14:59):    >100,000 CFU/ml Escherichia coli    ANTIBIOTICS:  ertapenem  IVPB 500 every 24 hours  ertapenem  IVPB      IMPRESSION:  89 yo male from home, bedridden, has HHA, has h/o dementia, CKD, /chronic indwelling Valencia, Afib on Eliquis, HTN who presented to ED for urinary retention/blocked Valencia cath with decreased urina output and hematuria, noted worsening renal function.     -Sepsis due to BSI/ UTI 2/2 ESBL E coli  -ESBL E coli bacteremia 2/2 UTI (BC 6/5 +; BC 6/7 NGTD)  -CA-UTI due to ESBL E coli  -Obstructive Uropathy- pt has chronic indwelling valencia and multiple bladder diverticula seen in previous imaging, prostate normal in CT, cystoscopy recommended in the past, bladder CA in the differential.   -Obstructive Uropathy BL hydronephrosis known since 2022 as per records, w/chronic indwelling Valencia cath, now with Moderate hydro with hydroureter identified to the level of the urinarybladder.  -TERENCE on CKD likely 2/2 obstructive uropathy.  -Acute encephalopathy sepsis / Dementia.     PLAN:  Ertapenem 500 mg IV daily   Follow cultures and sens  F/up Urology for [place] eval might, monitor urine Output  Monitor renal function, follow up repeat Renal US to assess hydroureter and hydronephrosis   Aspiration precautions  Discussed with medicine attending    Please reach ID with any questions or concerns  Dr. Darlene Blank  Available in Teams

## 2023-06-08 NOTE — PROGRESS NOTE ADULT - SUBJECTIVE AND OBJECTIVE BOX
NP Note discussed with  primary attending    Patient is a 89y old  Male who presents with a chief complaint of Urinary retention (08 Jun 2023 11:20)      INTERVAL HPI/OVERNIGHT EVENTS:  Denied pain.  Limited exam in demented pt.      MEDICATIONS  (STANDING):  albumin human 25% IVPB 100 milliLiter(s) IV Intermittent every 8 hours  apixaban 2.5 milliGRAM(s) Oral two times a day  dextrose 5%. 1000 milliLiter(s) (65 mL/Hr) IV Continuous <Continuous>  ertapenem  IVPB 500 milliGRAM(s) IV Intermittent every 24 hours  ertapenem  IVPB      polyethylene glycol 3350 17 Gram(s) Oral daily  senna 2 Tablet(s) Oral at bedtime    MEDICATIONS  (PRN):      __________________________________________________  REVIEW OF SYSTEMS:  Limited exam in demented pt     CONSTITUTIONAL: No fever  EYES: No acute visual disturbances  NECK: No pain or stiffness  RESPIRATORY: No cough; No shortness of breath  CARDIOVASCULAR: No chest pain, no palpitations  GASTROINTESTINAL: No pain. No nausea or vomiting.  No diarrhea   NEUROLOGICAL: No headache or numbness, no tremors  MUSCULOSKELETAL: No joint pain, no muscle pain  GENITOURINARY: No dysuria, no frequency, no hesitancy  PSYCHIATRY: No depression , no anxiety  ALL OTHER  ROS negative        Vital Signs Last 24 Hrs  T(C): 36.7 (08 Jun 2023 05:05), Max: 36.7 (07 Jun 2023 20:22)  T(F): 98.1 (08 Jun 2023 05:05), Max: 98.1 (08 Jun 2023 05:05)  HR: 100 (08 Jun 2023 05:05) (88 - 100)  BP: 120/64 (08 Jun 2023 05:05) (106/87 - 120/64)  RR: 18 (08 Jun 2023 05:05) (16 - 18)  SpO2: 97% (08 Jun 2023 05:05) (97% - 100%)    Parameters below as of 08 Jun 2023 05:05  Patient On (Oxygen Delivery Method): room air        ________________________________________________  PHYSICAL EXAM:  Limited exam in demented pt   GENERAL: NAD  HEENT: Normocephalic;  conjunctivae and sclerae clear; moist mucous membranes   NECK : Supple  CHEST/LUNG: Clear to auscultation bilaterally with good air entry   HEART: S1 S2  regular; no murmurs, gallops or rubs  ABDOMEN: Soft, Nontender, Nondistended; Bowel sounds present x 4 quad.  (+) Vogel.    EXTREMITIES: No cyanosis; no edema; no calf tenderness  SKIN: Warm and dry; no rash  NERVOUS SYSTEM:  Awake: Oriented to person, not place and time; no new deficits    _________________________________________________  LABS:                        8.6    9.22  )-----------( 239      ( 08 Jun 2023 06:39 )             26.0     06-08    143  |  117<H>  |  102<H>  ----------------------------<  106<H>  3.6   |  16<L>  |  4.16<H>    Ca    8.5      08 Jun 2023 06:39  Phos  4.5     06-07  Mg     1.9     06-07          CAPILLARY BLOOD GLUCOSE            RADIOLOGY & ADDITIONAL TESTS:    Imaging Personally Reviewed:  YES    Consultant(s) Notes Reviewed:   YES    Care Discussed with Consultants :     Plan of care was discussed with patient and /or primary care giver; all questions and concerns were addressed and care was aligned with patient's wishes.     NP Note discussed with  primary attending    Patient is a 89y old  Male who presents with a chief complaint of Urinary retention (08 Jun 2023 11:20)      INTERVAL HPI/OVERNIGHT EVENTS:  Denied pain.  Limited exam in demented pt.      MEDICATIONS  (STANDING):  albumin human 25% IVPB 100 milliLiter(s) IV Intermittent every 8 hours  apixaban 2.5 milliGRAM(s) Oral two times a day  dextrose 5%. 1000 milliLiter(s) (65 mL/Hr) IV Continuous <Continuous>  ertapenem  IVPB 500 milliGRAM(s) IV Intermittent every 24 hours  ertapenem  IVPB      polyethylene glycol 3350 17 Gram(s) Oral daily  senna 2 Tablet(s) Oral at bedtime    MEDICATIONS  (PRN):      __________________________________________________  REVIEW OF SYSTEMS:  Limited exam in demented pt     CONSTITUTIONAL: No fever  EYES: No acute visual disturbances  NECK: No pain or stiffness  RESPIRATORY: No cough; No shortness of breath  CARDIOVASCULAR: No chest pain, no palpitations  GASTROINTESTINAL: No pain. No nausea or vomiting.  No diarrhea   NEUROLOGICAL: No headache or numbness, no tremors  MUSCULOSKELETAL: No joint pain, no muscle pain  GENITOURINARY: No dysuria, no frequency, no hesitancy  PSYCHIATRY: No depression , no anxiety  ALL OTHER  ROS negative        Vital Signs Last 24 Hrs  T(C): 36.7 (08 Jun 2023 05:05), Max: 36.7 (07 Jun 2023 20:22)  T(F): 98.1 (08 Jun 2023 05:05), Max: 98.1 (08 Jun 2023 05:05)  HR: 100 (08 Jun 2023 05:05) (88 - 100)  BP: 120/64 (08 Jun 2023 05:05) (106/87 - 120/64)  RR: 18 (08 Jun 2023 05:05) (16 - 18)  SpO2: 97% (08 Jun 2023 05:05) (97% - 100%)    Parameters below as of 08 Jun 2023 05:05  Patient On (Oxygen Delivery Method): room air        ________________________________________________  PHYSICAL EXAM:  Limited exam in demented pt   GENERAL: NAD  HEENT: Normocephalic;  conjunctivae and sclerae clear; moist mucous membranes   NECK : Supple  CHEST/LUNG: Clear to auscultation bilaterally with good air entry   HEART: S1 S2  regular; no murmurs, gallops or rubs  ABDOMEN: Soft, Nontender, Nondistended; Bowel sounds present x 4 quad.  (+) Vogel.    EXTREMITIES: No cyanosis; no edema; no calf tenderness  SKIN: Warm and dry; (+) DTI Coccyx   NERVOUS SYSTEM:  Awake: Oriented to person, not place and time; no new deficits    _________________________________________________  LABS:                        8.6    9.22  )-----------( 239      ( 08 Jun 2023 06:39 )             26.0     06-08    143  |  117<H>  |  102<H>  ----------------------------<  106<H>  3.6   |  16<L>  |  4.16<H>    Ca    8.5      08 Jun 2023 06:39  Phos  4.5     06-07  Mg     1.9     06-07          CAPILLARY BLOOD GLUCOSE            RADIOLOGY & ADDITIONAL TESTS:    Imaging Personally Reviewed:  YES    Consultant(s) Notes Reviewed:   YES    Care Discussed with Consultants :     Plan of care was discussed with patient and /or primary care giver; all questions and concerns were addressed and care was aligned with patient's wishes.     NP Note discussed with  primary attending    Patient is a 89y old  Male who presents with a chief complaint of Urinary retention (08 Jun 2023 11:20)      INTERVAL HPI/OVERNIGHT EVENTS:  Denied pain.  Limited exam in demented pt.      MEDICATIONS  (STANDING):  albumin human 25% IVPB 100 milliLiter(s) IV Intermittent every 8 hours  apixaban 2.5 milliGRAM(s) Oral two times a day  dextrose 5%. 1000 milliLiter(s) (65 mL/Hr) IV Continuous <Continuous>  ertapenem  IVPB 500 milliGRAM(s) IV Intermittent every 24 hours  ertapenem  IVPB      polyethylene glycol 3350 17 Gram(s) Oral daily  senna 2 Tablet(s) Oral at bedtime    MEDICATIONS  (PRN):      __________________________________________________  REVIEW OF SYSTEMS:  Limited exam in demented pt     CONSTITUTIONAL: No fever  EYES: No acute visual disturbances  NECK: No pain or stiffness  RESPIRATORY: No cough; No shortness of breath  CARDIOVASCULAR: No chest pain, no palpitations  GASTROINTESTINAL: No pain. No nausea or vomiting.  No diarrhea   NEUROLOGICAL: No headache or numbness, no tremors  MUSCULOSKELETAL: No joint pain, no muscle pain  GENITOURINARY: No dysuria, no frequency, no hesitancy  PSYCHIATRY: No depression , no anxiety  ALL OTHER  ROS negative        Vital Signs Last 24 Hrs  T(C): 36.7 (08 Jun 2023 05:05), Max: 36.7 (07 Jun 2023 20:22)  T(F): 98.1 (08 Jun 2023 05:05), Max: 98.1 (08 Jun 2023 05:05)  HR: 100 (08 Jun 2023 05:05) (88 - 100)  BP: 120/64 (08 Jun 2023 05:05) (106/87 - 120/64)  RR: 18 (08 Jun 2023 05:05) (16 - 18)  SpO2: 97% (08 Jun 2023 05:05) (97% - 100%)    Parameters below as of 08 Jun 2023 05:05  Patient On (Oxygen Delivery Method): room air        ________________________________________________  PHYSICAL EXAM:  Limited exam in demented pt   GENERAL: NAD  HEENT: Normocephalic;  conjunctivae and sclerae clear; moist mucous membranes   NECK : Supple  CHEST/LUNG: Clear to auscultation bilaterally with good air entry   HEART: S1 S2  regular; no murmurs, gallops or rubs  ABDOMEN: Soft, Nontender, Nondistended; Bowel sounds present x 4 quad.  (+) Vogel.    EXTREMITIES: No cyanosis; no edema; no calf tenderness  SKIN: Warm and dry; (+) DTI Coccyx   NERVOUS SYSTEM:  Awake: Oriented to person, not place and time; no new deficits    _________________________________________________  LABS:                        8.6    9.22  )-----------( 239      ( 08 Jun 2023 06:39 )             26.0     06-08    143  |  117<H>  |  102<H>  ----------------------------<  106<H>  3.6   |  16<L>  |  4.16<H>    Ca    8.5      08 Jun 2023 06:39  Phos  4.5     06-07  Mg     1.9     06-07          CAPILLARY BLOOD GLUCOSE            RADIOLOGY & ADDITIONAL TESTS:    < from: US Renal (06.08.23 @ 14:00) >    ACC: [id number] EXAM:  US KIDNEY(S)   ORDERED BY:  TOYA HARRIS     PROCEDURE DATE:  06/08/2023          INTERPRETATION:  CLINICAL INFORMATION: Follow-up of hydronephrosis    COMPARISON: CT abdomen 6/6/2023.    TECHNIQUE: Sonography of the kidneys and bladder.    FINDINGS:  Right kidney: 11.5 cm. No renal mass, or calculi. Moderate right   hydronephrosis not significantly changed compared to recent CT.    Left kidney: 12.8 cm. No renal mass,  or calculi. Left hydronephrosis   improved, now mild..    Urinary bladder: Collapsed with Vogel    IMPRESSION:  Moderate right hydronephrosis similar to prior CT. Mild left   hydronephrosis slightly improved.        --- End of Report ---            ANNE PRINCE MD; Attending Radiologist  This documenthas been electronically signed. Jun 8 2023  2:25PM    < end of copied text >      Imaging Personally Reviewed:  YES    Consultant(s) Notes Reviewed:   YES    Care Discussed with Consultants :     Plan of care was discussed with patient and /or primary care giver; all questions and concerns were addressed and care was aligned with patient's wishes.

## 2023-06-08 NOTE — PROGRESS NOTE ADULT - SUBJECTIVE AND OBJECTIVE BOX
Subjective  No acute events overnight.    Objective    Vital signs  T(F): , Max: 98.1 (06-08-23 @ 05:05)  HR: 97 (06-08-23 @ 13:51)  BP: 118/71 (06-08-23 @ 13:51)  SpO2: 98% (06-08-23 @ 13:51)  Wt(kg): --    Output     OUT:    Indwelling Catheter - Urethral (mL): 1900 mL  Total OUT: 1900 mL    Total NET: -1900 mL      OUT:    Indwelling Catheter - Urethral (mL): 1000 mL  Total OUT: 1000 mL    Total NET: -1000 mL          Gen: NAD  Abd: soft, nontender, nondistended  : valencia secured in place, draining yellow cloudy uine with sediment    Labs      06-08 @ 06:39    WBC 9.22  / Hct 26.0  / SCr 4.16     06-07 @ 06:05    WBC 10.31 / Hct 28.1  / SCr 5.06         Culture - Blood (collected 06-07-23 @ 06:10)  Source: .Blood Blood-Peripheral  Preliminary Report (06-08-23 @ 13:02):    No growth to date.    Culture - Blood (collected 06-07-23 @ 06:05)  Source: .Blood Blood-Venous  Preliminary Report (06-08-23 @ 13:02):    No growth to date.    Culture - Blood (collected 06-05-23 @ 23:18)  Source: .Blood Blood  Gram Stain (06-06-23 @ 17:00):    Growth in aerobic bottle: Gram Negative Rods    Growth in anaerobic bottle: Gram Negative Rods  Final Report (06-08-23 @ 10:09):    Growth in aerobic and anaerobic bottles: Escherichia coli ESBL    ***Blood Panel PCR results on this specimen are available    approximately 3 hours after the Gram stain result.***    Gram stain, PCR, and/or culture results may not always    correspond dueto difference in methodologies.    ************************************************************    This PCR assay was performed by multiplex PCR. This    Assay tests for 66 bacterial and resistance gene targets.    Please refer to the [facility] test directory    at [email or web address] for details.  Organism: Blood Culture PCR  Escherichia coli ESBL (06-08-23 @ 10:09)  Organism: Escherichia coli ESBL (06-08-23 @ 10:09)      Method Type: KRISTI      -  Amikacin: S <=16      -  Ampicillin: R >16 These ampicillin results predict results for amoxicillin      -  Ampicillin/Sulbactam: R >16/8 Enterobacter, Klebsiella aerogenes, Citrobacter, and Serratia may develop resistance during prolonged therapy (3-4 days)      -  Aztreonam: R >16      -  Cefazolin: R >16 Enterobacter, Klebsiella aerogenes, Citrobacter, and Serratia may develop resistance during prolonged therapy (3-4 days)      -  Cefepime: R >16      -  Ceftriaxone: R >32 Enterobacter, Klebsiella aerogenes, Citrobacter, and Serratia may develop resistance during prolonged therapy      -  Ciprofloxacin: R >2      -  Ertapenem: S <=0.5      -  Gentamicin: R >8      -  Imipenem: S <=1      -  Levofloxacin: R >4      -  Meropenem: S <=1      -  Piperacillin/Tazobactam: R 64      -  Tobramycin: R >8      -  Trimethoprim/Sulfamethoxazole: S <=0.5/9.5  Organism: Blood Culture PCR (06-08-23 @ 10:09)      Method Type: PCR      -  Escherichia coli: Detec      -  ESBL: Detec      -  CTX-M Resistance Marker: Detec    Culture - Blood (collected 06-05-23 @ 23:18)  Source: .Blood Blood  Gram Stain (06-06-23 @ 21:44):    Growth in anaerobic bottle: Gram Negative Rods    Growth in aerobic bottle: Gram Negative Rods  Final Report (06-08-23 @ 10:10):    Growth in aerobic and anaerobic bottles: Escherichia coli ESBL    See previous culture [id number]    Culture - Urine (collected 06-05-23 @ 22:10)  Source: Clean Catch Clean Catch (Midstream)  Preliminary Report (06-07-23 @ 14:59):    >100,000 CFU/ml Escherichia coli

## 2023-06-08 NOTE — PROGRESS NOTE ADULT - ASSESSMENT
Patient is a 89yo Male with h/o dementia, bedbound, BPH w/ chronic valencia, afib (on eliquis), HTN that presents to the ED due to decreased urinary output from valencia catheter. Pt a/w TERENCE. Nephrology consulted for Elevated serum creatinine.  CT abd/pelvis with nodular hepatic parenchymal contour suggestive of underlying hepatocellular dz/cirrhosis. Moderate b/l hydro with hydroureter identified to the level of the urinary bladder. Compleletly decompressed bladder by valencia with bladder wall thickening    1. TERENCE- unknown baseline SCr. Previous SCr 1.66 on 5/29/23 & SCr 1.71 on 5/22/23. TERENCE in the setting of obstructive uropathy with ?cirrhosis with sepsis 2/2 UTI/ relative hypotension. Urology following.    UA active in the setting of infection. FeNa 3.6%; ?obstructive. Renal function slowly improving with good urine o/p. Will start D5 @ 65ml/hr due to hyperchloremia.    Recc to repeat Renal US to monitor resolution of hydro; if persistent recc NT. Strict I/Os. Avoid nephrotoxins/ NSAIDs/ RCA. Monitor BMP.  2. b/l hydronephrosis- Moderate b/l hydro with hydroureter with compressed bladder by valencia. Urology following. Recc good bowel regimen  3. Sepsis 2/2 UTI- (leukocytosis with tachycardia) +UA. Pt on Invanz. UCx Ecoli. BCx +ESBL Ecoli  4. Hypotension- BP low normal with ?cirrhosis. BP improving on albumin gtt 25% in 100ml q 8hrs x 48 hrs. Consider midodrine prn. Monitor BP    [facility]  Avelino Fair M.D.  Harrison Barajas D.O.  Carolann Regalado M.D.  Janine Ackerman, [place], ANP-C  [phone]  Fax: [phone] [place] [address], [address]  [place] [address]

## 2023-06-08 NOTE — PROGRESS NOTE ADULT - ASSESSMENT
89 year old, Male, from home with PMH of Dementia, BPH w/ chronic valencia, Afib (on Eliquis), & HTN.  Presented to the ED due to decreased urinary output from valencia catheter.  Admitted for Sepsis 2/2 Bacteremia and UTI-ESBL E. Coli.

## 2023-06-08 NOTE — PROGRESS NOTE ADULT - PROBLEM SELECTOR PLAN 3
- P/w TERENCE on CKD (baseline Scr=1.3)  - SCr 5.89 on admission with chronic valencia as per family valencia since 11/2022  - S/p CT with B/L hydronephrosis and decompressed bladder  - Concern for blockage somewhere else in  tract  - Renal US pending-f/u results Please advise on appt. Thank you   - P/w TERENCE on CKD (baseline Scr=1.3)  - SCr 5.89 on admission with chronic valencia as per family valencia since 11/2022  - S/p CT with B/L hydronephrosis and decompressed bladder  - Currently on IVF.  Reassess in AM.   - Concern for blockage somewhere else in  tract  - Renal US pending-f/u results  - Nephro-Dr. Regalado consulted, appreciated - P/w TERENCE on CKD (baseline Scr=1.3)  - SCr 5.89 on admission with chronic valencia as per family valencia since 11/2022  - S/p CT with B/L hydronephrosis and decompressed bladder  - Currently on IVF.  Reassess in AM.   - Concern for blockage somewhere else in  tract  - S/p Renal US noted above. Unchanged right hydro but slight improvement in left hydro.    - C/w Valencia- Continue to monitor BMP in AM-f/u results.   - Nephro-Dr. Regalado consulted, appreciated

## 2023-06-08 NOTE — PROGRESS NOTE ADULT - PROBLEM SELECTOR PLAN 1
- P/w sepsis, valencia exchanged in ED   - 6/7 hypotensive overnight  - S/p IVF   - (+) Bld & Ucx-ESBL Ecoli   - Currently on Ertapenem   - Repeat Bld cx's pending from 6/7 & 6/8-F/u results   - ID-Dr. Chase consulted, appreciated - P/w sepsis, valencia exchanged in ED   - 6/7 hypotensive overnight  - Currently on IVF.  Reassess in AM.   - (+) Bld & Ucx-ESBL Ecoli   - Currently on Ertapenem   - Repeat Bld cx's pending from 6/7 & 6/8-F/u results   - ID-Dr. Chase consulted, appreciated

## 2023-06-08 NOTE — PROGRESS NOTE ADULT - ASSESSMENT
89M w/ PMH dementia, bedbound, BPH w/ chronic Valencia, Afib (on Eliquis), HTN that presents to the ED due to decreased urinary output from valencia catheter. Catheter exchanged by ED on presentation. Cr 5.75 from baseline 1.30 in November.    - Labs reviewed - Cr 4.16 from 5.06  - CT images reviewed - hydronephrosis expected in setting of recent urinary retention in patient with history of prior hydronephrosis  - Renal US images reviewed - improved R hydronephrosis, persistent L hydronephrosis  - No urologic intervention indicated  - Continue Valencia catheter  - Continue to trend Cr  - Continue IV antibiotics per ID  - Follow up final urine and blood cultures (ESBL E. coli)  - Follow up nephrology recommendations

## 2023-06-09 LAB
ANION GAP SERPL CALC-SCNC: 8 MMOL/L — SIGNIFICANT CHANGE UP (ref 5–17)
BASE EXCESS BLDV CALC-SCNC: -4.4 MMOL/L — SIGNIFICANT CHANGE UP
BUN SERPL-MCNC: 95 MG/DL — HIGH (ref 7–18)
CALCIUM SERPL-MCNC: 9.2 MG/DL — SIGNIFICANT CHANGE UP (ref 8.4–10.5)
CHLORIDE SERPL-SCNC: 120 MMOL/L — HIGH (ref 96–108)
CO2 SERPL-SCNC: 18 MMOL/L — LOW (ref 22–31)
CREAT SERPL-MCNC: 3.28 MG/DL — HIGH (ref 0.5–1.3)
CRP SERPL-MCNC: 115 MG/L — HIGH
EGFR: 17 ML/MIN/1.73M2 — LOW
ERYTHROCYTE [SEDIMENTATION RATE] IN BLOOD: 92 MM/HR — HIGH (ref 0–20)
GLUCOSE SERPL-MCNC: 100 MG/DL — HIGH (ref 70–99)
HCO3 BLDV-SCNC: 19 MMOL/L — LOW (ref 22–29)
HCT VFR BLD CALC: 27.3 % — LOW (ref 39–50)
HGB BLD-MCNC: 8.9 G/DL — LOW (ref 13–17)
LACTATE SERPL-SCNC: 1 MMOL/L — SIGNIFICANT CHANGE UP (ref 0.7–2)
MCHC RBC-ENTMCNC: 25.8 PG — LOW (ref 27–34)
MCHC RBC-ENTMCNC: 32.6 GM/DL — SIGNIFICANT CHANGE UP (ref 32–36)
MCV RBC AUTO: 79.1 FL — LOW (ref 80–100)
NRBC # BLD: 0 /100 WBCS — SIGNIFICANT CHANGE UP (ref 0–0)
PCO2 BLDV: 29 MMHG — LOW (ref 42–55)
PH BLDV: 7.43 — SIGNIFICANT CHANGE UP (ref 7.32–7.43)
PLATELET # BLD AUTO: 269 K/UL — SIGNIFICANT CHANGE UP (ref 150–400)
PO2 BLDV: 64 MMHG — SIGNIFICANT CHANGE UP
POTASSIUM SERPL-MCNC: 3.5 MMOL/L — SIGNIFICANT CHANGE UP (ref 3.5–5.3)
POTASSIUM SERPL-SCNC: 3.5 MMOL/L — SIGNIFICANT CHANGE UP (ref 3.5–5.3)
RBC # BLD: 3.45 M/UL — LOW (ref 4.2–5.8)
RBC # FLD: 17.6 % — HIGH (ref 10.3–14.5)
SAO2 % BLDV: 94.7 % — SIGNIFICANT CHANGE UP
SODIUM SERPL-SCNC: 146 MMOL/L — HIGH (ref 135–145)
WBC # BLD: 9.95 K/UL — SIGNIFICANT CHANGE UP (ref 3.8–10.5)
WBC # FLD AUTO: 9.95 K/UL — SIGNIFICANT CHANGE UP (ref 3.8–10.5)

## 2023-06-09 PROCEDURE — 99232 SBSQ HOSP IP/OBS MODERATE 35: CPT

## 2023-06-09 PROCEDURE — 99233 SBSQ HOSP IP/OBS HIGH 50: CPT

## 2023-06-09 RX ORDER — SODIUM CHLORIDE 9 MG/ML
1000 INJECTION, SOLUTION INTRAVENOUS
Refills: 0 | Status: DISCONTINUED | OUTPATIENT
Start: 2023-06-09 | End: 2023-06-13

## 2023-06-09 RX ADMIN — SODIUM CHLORIDE 85 MILLILITER(S): 9 INJECTION, SOLUTION INTRAVENOUS at 11:23

## 2023-06-09 RX ADMIN — APIXABAN 2.5 MILLIGRAM(S): 2.5 TABLET, FILM COATED ORAL at 05:19

## 2023-06-09 RX ADMIN — ERTAPENEM SODIUM 100 MILLIGRAM(S): 1 INJECTION, POWDER, LYOPHILIZED, FOR SOLUTION INTRAMUSCULAR; INTRAVENOUS at 18:22

## 2023-06-09 RX ADMIN — SODIUM CHLORIDE 85 MILLILITER(S): 9 INJECTION, SOLUTION INTRAVENOUS at 21:32

## 2023-06-09 RX ADMIN — SENNA PLUS 2 TABLET(S): 8.6 TABLET ORAL at 21:27

## 2023-06-09 RX ADMIN — PANTOPRAZOLE SODIUM 40 MILLIGRAM(S): 20 TABLET, DELAYED RELEASE ORAL at 05:20

## 2023-06-09 RX ADMIN — APIXABAN 2.5 MILLIGRAM(S): 2.5 TABLET, FILM COATED ORAL at 18:22

## 2023-06-09 RX ADMIN — POLYETHYLENE GLYCOL 3350 17 GRAM(S): 17 POWDER, FOR SOLUTION ORAL at 11:23

## 2023-06-09 NOTE — PROGRESS NOTE ADULT - SUBJECTIVE AND OBJECTIVE BOX
NP Note discussed with  primary attending    Patient is a 89y old  Male who presents with a chief complaint of Urinary retention (09 Jun 2023 08:40)      INTERVAL HPI/OVERNIGHT EVENTS: Denied pain.  Limited exam in demented pt.      MEDICATIONS  (STANDING):  apixaban 2.5 milliGRAM(s) Oral two times a day  dextrose 5%. 1000 milliLiter(s) (85 mL/Hr) IV Continuous <Continuous>  ertapenem  IVPB 500 milliGRAM(s) IV Intermittent every 24 hours  ertapenem  IVPB      pantoprazole    Tablet 40 milliGRAM(s) Oral before breakfast  polyethylene glycol 3350 17 Gram(s) Oral daily  senna 2 Tablet(s) Oral at bedtime    MEDICATIONS  (PRN):      __________________________________________________  REVIEW OF SYSTEMS:  Limited exam in demented pt     CONSTITUTIONAL: No fever  EYES: No acute visual disturbances  NECK: No pain or stiffness  RESPIRATORY: No cough; No shortness of breath  CARDIOVASCULAR: No chest pain, no palpitations  GASTROINTESTINAL: No pain. No nausea or vomiting.  No diarrhea   NEUROLOGICAL: No headache or numbness, no tremors  MUSCULOSKELETAL: No joint pain, no muscle pain  GENITOURINARY: No dysuria, no frequency, no hesitancy  PSYCHIATRY: No depression , no anxiety  ALL OTHER  ROS negative          Vital Signs Last 24 Hrs  T(C): 37.2 (09 Jun 2023 05:22), Max: 37.2 (09 Jun 2023 05:22)  T(F): 98.9 (09 Jun 2023 05:22), Max: 98.9 (09 Jun 2023 05:22)  HR: 101 (09 Jun 2023 05:22) (88 - 101)  BP: 123/69 (09 Jun 2023 05:22) (118/71 - 123/69)  RR: 18 (09 Jun 2023 05:22) (17 - 18)  SpO2: 95% (09 Jun 2023 05:22) (95% - 98%)    Parameters below as of 09 Jun 2023 05:22  Patient On (Oxygen Delivery Method): room air        ________________________________________________  PHYSICAL EXAM:  Limited exam in demented pt   GENERAL: NAD  HEENT: Normocephalic;  conjunctivae and sclerae clear; moist mucous membranes   NECK : Supple  CHEST/LUNG: Clear to auscultation bilaterally with good air entry   HEART: S1 S2  regular; no murmurs, gallops or rubs  ABDOMEN: Soft, Nontender, Nondistended; Bowel sounds present x 4 quad.  (+) Vogel.    EXTREMITIES: No cyanosis; no edema; no calf tenderness  SKIN: Warm and dry; (+) DTI Coccyx   NERVOUS SYSTEM:  Awake: Oriented to person, not place and time; no new deficits  _________________________________________________  LABS:                        8.9    9.95  )-----------( 269      ( 09 Jun 2023 05:10 )             27.3     06-09    146<H>  |  120<H>  |  95<H>  ----------------------------<  100<H>  3.5   |  18<L>  |  3.28<H>    Ca    9.2      09 Jun 2023 05:10          CAPILLARY BLOOD GLUCOSE            RADIOLOGY & ADDITIONAL TESTS:    Imaging Personally Reviewed:  YES    Consultant(s) Notes Reviewed:   YES    Care Discussed with Consultants :     Plan of care was discussed with patient and /or primary care giver; all questions and concerns were addressed and care was aligned with patient's wishes.

## 2023-06-09 NOTE — PROGRESS NOTE ADULT - ATTENDING COMMENTS
89M w/ PMH dementia, bedbound, BPH w/ chronic Valencia, Afib (on Eliquis), HTN that presents to the ED due to decreased urinary output from valencia catheter. Catheter exchanged by ED on presentation. Cr 5.75 from baseline 1.30 in November.    - Labs reviewed  - CT images reviewed - hydronephrosis expected in setting of recent urinary retention in patient with history of prior hydronephrosis  - Continue Valenica catheter  - Trend Cr - 5.06 from 5.75  - Consider further imaging (LINDA) in 3 days if Cr does not continue to improve  - Continue IV antibiotics  - Follow up urine and blood cultures (ESBL E. coli)  - Follow up nephrology recommendations
89M w/ PMH dementia, bedbound, BPH w/ chronic Valencia, Afib (on Eliquis), HTN that presents to the ED due to decreased urinary output from valencia catheter. Catheter exchanged by ED on presentation. Cr 5.75 from baseline 1.30 in November.    - Labs reviewed - Cr 3.28 from 4.16  - Renal US images reviewed - improved R hydronephrosis, persistent L hydronephrosis  - No urologic intervention indicated  - Continue Valencia catheter  - Continue to trend Cr  - Continue IV antibiotics per ID  - Follow up final repeat blood cultures (NGTD)  - Follow up nephrology recommendations
89M w/ PMH dementia, bedbound, BPH w/ chronic Valencia, Afib (on Eliquis), HTN that presents to the ED due to decreased urinary output from valencia catheter. Catheter exchanged by ED on presentation. Cr 5.75 from baseline 1.30 in November.    - Labs reviewed - Cr 4.16 from 5.06  - CT images reviewed - hydronephrosis expected in setting of recent urinary retention in patient with history of prior hydronephrosis  - Renal US images reviewed - improved R hydronephrosis, persistent L hydronephrosis  - No urologic intervention indicated  - Continue Valencia catheter  - Continue to trend Cr  - Continue IV antibiotics per ID  - Follow up final urine and blood cultures (ESBL E. coli)  - Follow up nephrology recommendations

## 2023-06-09 NOTE — PROGRESS NOTE ADULT - PROBLEM SELECTOR PLAN 4
- P/w gross blood in valencia catheter  - Now resolved   - H&H stable  - Continue to monitor CBC in AM-f/u results  - Uro-Dr. Chris consulted, appreciated

## 2023-06-09 NOTE — PROGRESS NOTE ADULT - PROBLEM SELECTOR PLAN 7
- Pt recently discharged from [facility], discharged home on Thursday 6/1, lives home with wife

## 2023-06-09 NOTE — PROGRESS NOTE ADULT - PROBLEM SELECTOR PLAN 1
- P/w sepsis, valencia exchanged in ED   - 6/7 hypotensive overnight  - Currently on IVF.  Reassess in AM.   - (+) Bld & Ucx-ESBL Ecoli   - Currently on Ertapenem   - Repeat Bld cx's from 6/7 negative    - Repeat Bld cx's pending from 6/8-F/u results   - ID-Dr. Chase consulted, appreciated

## 2023-06-09 NOTE — PROGRESS NOTE ADULT - SUBJECTIVE AND OBJECTIVE BOX
[facility] NEPHROLOGY- PROGRESS NOTE    Patient is a 88yo Male with h/o dementia, bedbound, BPH w/ chronic valencia, afib (on eliquis), HTN that presents to the ED due to decreased urinary output from valencia catheter. Pt a/w TERENCE. Nephrology consulted for Elevated serum creatinine.  CT abd/pelvis with nodular hepatic parenchymal contour suggestive of underlying hepatocellular dz/cirrhosis. Moderate b/l hydro with hydroureter identified to the level of the urinary bladder. Compleletly decompressed bladder by valencia with bladder wall thickening    Hospital Medications: Medications reviewed.  REVIEW OF SYSTEMS: Unreliable due to dementia: denies any SOB    VITALS:  T(F): 98.9 (23 @ 05:22), Max: 98.9 (23 @ 05:22)  HR: 101 (23 @ 05:22)  BP: 123/69 (23 @ 05:22)  RR: 18 (23 @ 05:22)  SpO2: 95% (23 @ 05:22)  Wt(kg): --     @ 07:01  -   @ 07:00  --------------------------------------------------------  IN: 0 mL / OUT: 3100 mL / NET: -3100 mL      PHYSICAL EXAM:  Gen: NAD,   HEENT: anicteric  Neck: no JVD  Cards: RRR, +S1/S2, no M/G/R  Resp: CTA B/L  GI: soft, NT/ND, NABS  : +valencia  Extremities: no LE edema B/L;     LABS:      146<H>  |  120<H>  |  95<H>  ----------------------------<  100<H>  3.5   |  18<L>  |  3.28<H>    Ca    9.2      2023 05:10      Creatinine Trend: 3.28 <--, 4.16 <--, 5.06 <--, 5.75 <--, 6.11 <--, 5.89 <--                        8.9    9.95  )-----------( 269      ( 2023 05:10 )             27.3     Urine Studies:  Urinalysis Basic - ( 2023 22:10 )    Color: Yellow / Appearance: Slightly Turbid / S.010 / pH:   Gluc:  / Ketone: Negative  / Bili: Negative / Urobili: Negative   Blood:  / Protein: 100 mg/dL / Nitrite: Negative   < from: US Renal (23 @ 14:00) >    ACC: [id number] EXAM:  US KIDNEY(S)   ORDERED BY:  TOYA HARRIS     PROCEDURE DATE:  2023        < end of copied text >  < from: US Renal (23 @ 14:00) >    FINDINGS:  Right kidney: 11.5 cm. No renal mass, or calculi. Moderate right   hydronephrosis not significantly changed compared to recent CT.    Left kidney: 12.8 cm. No renal mass,  or calculi. Left hydronephrosis   improved, now mild..    Urinary bladder: Collapsed with Valencia    IMPRESSION:  Moderate right hydronephrosis similar to prior CT. Mild left   hydronephrosis slightly improved.        < end of copied text >  Leuk Esterase: Moderate / RBC: 10-25 /HPF / WBC 11-25 /HPF   Sq Epi:  / Non Sq Epi:  / Bacteria: Moderate /HPF      Creatinine, Random Urine: 42 mg/dL ( @ 17:56)  Sodium, Random Urine: 35 mmol/L ( @ 17:56)

## 2023-06-09 NOTE — PROGRESS NOTE ADULT - ASSESSMENT
Subjective: NAD, afebrile, pleasantly demented, no events overnight     REVIEW OF SYSTEMS: Unable to get history since pt is demented.     PE:  Vital Signs Last 24 Hrs  T(C): 37.2 (09 Jun 2023 05:22), Max: 37.2 (09 Jun 2023 05:22)  T(F): 98.9 (09 Jun 2023 05:22), Max: 98.9 (09 Jun 2023 05:22)  HR: 101 (09 Jun 2023 05:22) (88 - 101)  BP: 123/69 (09 Jun 2023 05:22) (118/71 - 123/69)  RR: 18 (09 Jun 2023 05:22) (17 - 18)  SpO2: 95% (09 Jun 2023 05:22) (95% - 98%)    Parameters below as of 09 Jun 2023 05:22 Patient On (Oxygen Delivery Method): room air    Gen: AOx0-1, NAD, non toxic, pleasantly demented  HEAD:  Atraumatic, Normocephalic  EYES: PERRLA, conjunctiva and sclera clear  ENT: Moist mucous membranes  NECK: Supple, No JVD  CV: S1+S2 normal, no murmur  Resp: Clear bilat, no resp distress, no crackles/wheezes  Abd: Soft, nontender, +BS  Ext: No LE edema, no cyanosis, pulses +  : + Valencia with yellow urine  IV/Skin: No thrombophlebitis  Msk: No low back pain, no arthralgias, no joint swelling  Neuro: AAOx0-1, no focal signs   Psych: no anxiety, no delusional ideas, no suicidal ideation    LABS/DIAGNOSTIC TESTS:                        8.9    9.95  )-----------( 269      ( 09 Jun 2023 05:10 )             27.3     WBC Count: 9.95 K/uL (06-09 @ 05:10)  WBC Count: 9.22 K/uL (06-08 @ 06:39)  WBC Count: 10.31 K/uL (06-07 @ 06:05)    06-09    146<H>  |  120<H>  |  95<H>  ----------------------------<  100<H>  3.5   |  18<L>  |  3.28<H>    Ca    9.2      09 Jun 2023 05:10    CULTURES:   Culture - Blood (collected 06-07-23 @ 06:10)  Source: .Blood Blood-Peripheral  Preliminary Report (06-08-23 @ 13:02):    No growth to date.    Culture - Blood (collected 06-07-23 @ 06:05)  Source: .Blood Blood-Venous  Preliminary Report (06-08-23 @ 13:02):    No growth to date.    Culture - Blood (collected 06-05-23 @ 23:18)  Source: .Blood Blood  Gram Stain (06-06-23 @ 17:00):    Growth in aerobic bottle: Gram Negative Rods    Growth in anaerobic bottle: Gram Negative Rods  Final Report (06-08-23 @ 10:09):    Growth in aerobic and anaerobic bottles: Escherichia coli ESBL    Organism: Blood Culture PCR  Escherichia coli ESBL (06-08-23 @ 10:09)  Organism: Escherichia coli ESBL (06-08-23 @ 10:09)      Method Type: KRISTI      -  Amikacin: S <=16      -  Ampicillin: R >16 These ampicillin results predict results for amoxicillin      -  Ampicillin/Sulbactam: R >16/8 Enterobacter, Klebsiella aerogenes, Citrobacter, and Serratia may develop resistance during prolonged therapy (3-4 days)      -  Aztreonam: R >16      -  Cefazolin: R >16 Enterobacter, Klebsiella aerogenes, Citrobacter, and Serratia may develop resistance during prolonged therapy (3-4 days)      -  Cefepime: R >16      -  Ceftriaxone: R >32 Enterobacter, Klebsiella aerogenes, Citrobacter, and Serratia may develop resistance during prolonged therapy      -  Ciprofloxacin: R >2      -  Ertapenem: S <=0.5      -  Gentamicin: R >8      -  Imipenem: S <=1      -  Levofloxacin: R >4      -  Meropenem: S <=1      -  Piperacillin/Tazobactam: R 64      -  Tobramycin: R >8      -  Trimethoprim/Sulfamethoxazole: S <=0.5/9.5  Organism: Blood Culture PCR (06-08-23 @ 10:09)      Method Type: PCR      -  Escherichia coli: Detec      -  ESBL: Detec      -  CTX-M Resistance Marker: Detec    Culture - Blood (collected 06-05-23 @ 23:18)  Source: .Blood Blood  Gram Stain (06-06-23 @ 21:44):    Growth in anaerobic bottle: Gram Negative Rods    Growth in aerobic bottle: Gram Negative Rods  Final Report (06-08-23 @ 10:10):    Growth in aerobic and anaerobic bottles: Escherichia coli ESBL    See previous culture [id number]    Culture - Urine (collected 06-05-23 @ 22:10)  Source: Clean Catch Clean Catch (Midstream)  Final Report (06-08-23 @ 17:29):    >100,000 CFU/ml Escherichia coli ESBL    >100,000 CFU/ml Escherichia coli ESBL #2    Multiple Morphological Strains  Organism: Escherichia coli ESBL  Escherichia coli ESBL (06-08-23 @ 17:29)  Organism: Escherichia coli ESBL (06-08-23 @ 17:29)      Method Type: KRISTI      -  Amikacin: S <=16      -  Amoxicillin/Clavulanic Acid: S <=8/4      -  Ampicillin: R >16 These ampicillin results predict results for amoxicillin      -  Ampicillin/Sulbactam: S 8/4 Enterobacter, Klebsiella aerogenes, Citrobacter, and Serratia may develop resistance during prolonged therapy (3-4 days)      -  Aztreonam: R >16      -  Cefazolin: R >16 For uncomplicated UTI with K. pneumoniae, E. coli, or P. mirablis: KRISTI <=16 is sensitive and KRISTI >=32 is resistant. This also predicts results for oral agents cefaclor, cefdinir, cefpodoxime, cefprozil, cefuroxime axetil, cephalexin and locarbef for uncomplicated UTI. Note that some isolates may be susceptible to these agents while testing resistant to cefazolin.      -  Cefepime: R >16      -  Ceftriaxone: R >32 Enterobacter, Klebsiella aerogenes, Citrobacter, and Serratia may develop resistance during prolonged therapy      -  Cefuroxime: R >16      -  Ciprofloxacin: R >2      -  Ertapenem: S <=0.5      -  Gentamicin: S <=2      -  Imipenem: S <=1      -  Levofloxacin: R >4      -  Meropenem: S <=1      -  Nitrofurantoin: S <=32 Should not be used to treat pyelonephritis      -  Piperacillin/Tazobactam: S <=8      -  Tobramycin: S <=2      -  Trimethoprim/Sulfamethoxazole: R >2/38  Organism: Escherichia coli ESBL (06-08-23 @ 17:29)      Method Type: KRISTI      -  Amikacin: S <=16      -  Amoxicillin/Clavulanic Acid: S <=8/4      -  Ampicillin: R >16 These ampicillin results predict results for amoxicillin      -  Ampicillin/Sulbactam: S 8/4 Enterobacter, Klebsiella aerogenes, Citrobacter, and Serratia may develop resistance during prolonged therapy (3-4 days)      -  Aztreonam: R >16      -  Cefazolin: R >16 For uncomplicated UTI with K. pneumoniae, E. coli, or P. mirablis: KRISTI <=16 is sensitive and KRISTI >=32 is resistant. This also predicts results for oral agents cefaclor, cefdinir, cefpodoxime, cefprozil, cefuroxime axetil, cephalexin and locarbef for uncomplicated UTI. Note that some isolates may be susceptible to these agents while testing resistant to cefazolin.      -  Cefepime: R >16      -  Ceftriaxone: R >32 Enterobacter, Klebsiella aerogenes, Citrobacter, and Serratia may develop resistance during prolonged therapy      -  Cefuroxime: R >16      -  Ciprofloxacin: R >2      -  Ertapenem: S <=0.5      -  Gentamicin: S <=2      -  Imipenem: S <=1      -  Levofloxacin: R >4      -  Meropenem: S <=1      -  Nitrofurantoin: S <=32 Should not be used to treat pyelonephritis      -  Piperacillin/Tazobactam: S <=8      -  Tobramycin: S <=2      -  Trimethoprim/Sulfamethoxazole: S <=0.5/9.5    RADIOLOGY: < from: US Renal (06.08.23 @ 14:00) >  FINDINGS:  Right kidney: 11.5 cm. No renal mass, or calculi. Moderate right hydronephrosis not significantly changed compared to recent CT.    Left kidney: 12.8 cm. No renal mass,  or calculi. Left hydronephrosis improved, now mild.    Urinary bladder: Collapsed with Valencia    IMPRESSION:  Moderate right hydronephrosis similar to prior CT. Mild left hydronephrosis slightly improved.    ANTIBIOTICS:  ertapenem  IVPB 500 every 24 hours  ertapenem  IVPB      IMPRESSION:  88 yo male from home, bedridden, has HHA, has h/o dementia, CKD, /chronic indwelling Valencia, Afib on Eliquis, HTN who presented to ED for urinary retention/blocked Valencia cath with decreased urinary output and hematuria, noted worsening renal function(TERENCE on CKD)    -Sepsis due to BSI/ UTI 2/2 ESBL E coli.  -ESBL E coli bacteremia 2/2 UTI (BC 6/5 +; BC 6/7 NGTD).  -CA-UTI due to ESBL E coli.  -Obstructive Uropathy- pt has chronic indwelling valencia and multiple bladder diverticula seen in previous imaging, prostate normal in CT, cystoscopy recommended in the past, bladder CA in the differential.   -Obstructive Uropathy BL hydronephrosis known since 2022 as per records, w/chronic indwelling Valencia cath, now with Moderate hydro with hydroureter identified to the level of the urinarybladder.  -TERENCE on CKD likely 2/2 obstructive uropathy.  -Acute encephalopathy sepsis / Dementia.     PLAN:   Continue Ertapenem 500 mg q24 hrs for total 14 days after negative blood cultures (6/7- 6/21)  Weekly labs while on abx therapy CBC w/diff, CMP, ESR and CRP  Urology following, recommending to keep valencia cath, no plans for cystoscopy, Renal US reviewed, hydronephrosis present R>L, follow up OP  Monitor renal function  Valencia care(dandre care), change cath q14-21 days  F/up OP with PMD  Discussed with medicine attending  ID will sign off    Please reach ID with any questions or concerns  Dr. Darlene Blank  Available in Teams

## 2023-06-09 NOTE — PROGRESS NOTE ADULT - ASSESSMENT
Patient is a 89yo Male with h/o dementia, bedbound, BPH w/ chronic valencia, afib (on eliquis), HTN that presents to the ED due to decreased urinary output from valencia catheter. Pt a/w TERENCE. Nephrology consulted for Elevated serum creatinine.  CT abd/pelvis with nodular hepatic parenchymal contour suggestive of underlying hepatocellular dz/cirrhosis. Moderate b/l hydro with hydroureter identified to the level of the urinary bladder. Compleletly decompressed bladder by valencia with bladder wall thickening    1. TERENCE- unknown baseline SCr. Previous SCr 1.66 on 5/29/23 & SCr 1.71 on 5/22/23. TERENCE in the setting of obstructive uropathy with ?cirrhosis with sepsis 2/2 UTI/ relative hypotension. Urology following.    UA active in the setting of infection. FeNa 3.6%; ?obstructive. Renal function slowly improving with good urine o/p. Recc D5 @ 85ml/hr due to hyperchloremic hypernatremia.    Repeat Renal US with persistent moderate Rt hydro  and mild left hydro (slight improvement); f/u Urology  Strict I/Os. Avoid nephrotoxins/ NSAIDs/ RCA. Monitor BMP.  2. b/l hydronephrosis- Moderate b/l hydro with hydroureter with compressed bladder by valencia. Urology following. c/w good bowel regimen  3. Sepsis 2/2 UTI- (leukocytosis with tachycardia) +UA. Pt on Invanz. UCx Ecoli. BCx +ESBL Ecoli  4. Hypotension- BP low normal with ?cirrhosis. BP improving s/p albumin gtt 25% in 100ml q 8hrs x 48 hrs.  Monitor BP    [facility] NEPHROLOGY  Avelino Fair M.D.  ELOY Porter.O.  Carolann Regalado M.D.  Janine Ackerman, MSN, ANP-C  [phone]  Fax: [phone] [address] [place] [address], [address]  [place] [address]

## 2023-06-09 NOTE — PROGRESS NOTE ADULT - PROBLEM SELECTOR PLAN 3
- P/w TERENCE on CKD (baseline Scr=1.3)  - SCr 5.89 on admission with chronic valencia as per family valencia since 11/2022  - S/p CT with B/L hydronephrosis and decompressed bladder  - Currently on IVF.  Reassess in AM.   - Concern for blockage somewhere else in  tract  - S/p Renal US noted above. Unchanged right hydro but slight improvement in left hydro.    - C/w Valencia- Continue to monitor BMP in AM-f/u results.   - Nephro-Dr. Regalado consulted, appreciated

## 2023-06-09 NOTE — PROGRESS NOTE ADULT - SUBJECTIVE AND OBJECTIVE BOX
Subjective  No acute events overnight.    Objective    Vital signs  T(F): , Max: 98.9 (06-09-23 @ 05:22)  HR: 101 (06-09-23 @ 05:22)  BP: 123/69 (06-09-23 @ 05:22)  SpO2: 95% (06-09-23 @ 05:22)  Wt(kg): --    Output     OUT:    Indwelling Catheter - Urethral (mL): 3100 mL  Total OUT: 3100 mL    Total NET: -3100 mL          Gen: NAD  Abd: soft, nontender, nondistended  : valencia secured in place, draining cloudy yellow urine    Labs      06-09 @ 05:10    WBC 9.95  / Hct 27.3  / SCr 3.28     06-08 @ 06:39    WBC 9.22  / Hct 26.0  / SCr 4.16         Culture - Blood (collected 06-07-23 @ 06:10)  Source: .Blood Blood-Peripheral  Preliminary Report (06-08-23 @ 13:02):    No growth to date.    Culture - Blood (collected 06-07-23 @ 06:05)  Source: .Blood Blood-Venous  Preliminary Report (06-08-23 @ 13:02):    No growth to date.    Culture - Blood (collected 06-05-23 @ 23:18)  Source: .Blood Blood  Gram Stain (06-06-23 @ 17:00):    Growth in aerobic bottle: Gram Negative Rods    Growth in anaerobic bottle: Gram Negative Rods  Final Report (06-08-23 @ 10:09):    Growth in aerobic and anaerobic bottles: Escherichia coli ESBL    ***Blood Panel PCR results on this specimen are available    approximately 3 hours after the Gram stain result.***    Gram stain, PCR, and/or culture results may not always    correspond dueto difference in methodologies.    ************************************************************    This PCR assay was performed by multiplex PCR. This    Assay tests for 66 bacterial and resistance gene targets.    Please refer to the [facility] Labs test directory    at [email or web address] for details.  Organism: Blood Culture PCR  Escherichia coli ESBL (06-08-23 @ 10:09)  Organism: Escherichia coli ESBL (06-08-23 @ 10:09)      Method Type: KRISTI      -  Amikacin: S <=16      -  Ampicillin: R >16 These ampicillin results predict results for amoxicillin      -  Ampicillin/Sulbactam: R >16/8 Enterobacter, Klebsiella aerogenes, Citrobacter, and Serratia may develop resistance during prolonged therapy (3-4 days)      -  Aztreonam: R >16      -  Cefazolin: R >16 Enterobacter, Klebsiella aerogenes, Citrobacter, and Serratia may develop resistance during prolonged therapy (3-4 days)      -  Cefepime: R >16      -  Ceftriaxone: R >32 Enterobacter, Klebsiella aerogenes, Citrobacter, and Serratia may develop resistance during prolonged therapy      -  Ciprofloxacin: R >2      -  Ertapenem: S <=0.5      -  Gentamicin: R >8      -  Imipenem: S <=1      -  Levofloxacin: R >4      -  Meropenem: S <=1      -  Piperacillin/Tazobactam: R 64      -  Tobramycin: R >8      -  Trimethoprim/Sulfamethoxazole: S <=0.5/9.5  Organism: Blood Culture PCR (06-08-23 @ 10:09)      Method Type: PCR      -  Escherichia coli: Detec      -  ESBL: Detec      -  CTX-M Resistance Marker: Detec    Culture - Blood (collected 06-05-23 @ 23:18)  Source: .Blood Blood  Gram Stain (06-06-23 @ 21:44):    Growth in anaerobic bottle: Gram Negative Rods    Growth in aerobic bottle: Gram Negative Rods  Final Report (06-08-23 @ 10:10):    Growth in aerobic and anaerobic bottles: Escherichia coli ESBL    See previous culture [id number]    Culture - Urine (collected 06-05-23 @ 22:10)  Source: Clean Catch Clean Catch (Midstream)  Final Report (06-08-23 @ 17:29):    >100,000 CFU/ml Escherichia coli ESBL    >100,000 CFU/ml Escherichia coli ESBL #2    Multiple Morphological Strains  Organism: Escherichia coli ESBL  Escherichia coli ESBL (06-08-23 @ 17:29)  Organism: Escherichia coli ESBL (06-08-23 @ 17:29)      Method Type: KRISTI      -  Amikacin: S <=16      -  Amoxicillin/Clavulanic Acid: S <=8/4      -  Ampicillin: R >16 These ampicillin results predict results for amoxicillin      -  Ampicillin/Sulbactam: S 8/4 Enterobacter, Klebsiella aerogenes, Citrobacter, and Serratia may develop resistance during prolonged therapy (3-4 days)      -  Aztreonam: R >16      -  Cefazolin: R >16 For uncomplicated UTI with K. pneumoniae, E. coli, or P. mirablis: KRISTI <=16 is sensitive and KRISTI >=32 is resistant. This also predicts results for oral agents cefaclor, cefdinir, cefpodoxime, cefprozil, cefuroxime axetil, cephalexin and locarbef for uncomplicated UTI. Note that some isolates may be susceptible to these agents while testing resistant to cefazolin.      -  Cefepime: R >16      -  Ceftriaxone: R >32 Enterobacter, Klebsiella aerogenes, Citrobacter, and Serratia may develop resistance during prolonged therapy      -  Cefuroxime: R >16      -  Ciprofloxacin: R >2      -  Ertapenem: S <=0.5      -  Gentamicin: S <=2      -  Imipenem: S <=1      -  Levofloxacin: R >4      -  Meropenem: S <=1      -  Nitrofurantoin: S <=32 Should not be used to treat pyelonephritis      -  Piperacillin/Tazobactam: S <=8      -  Tobramycin: S <=2      -  Trimethoprim/Sulfamethoxazole: R >2/38  Organism: Escherichia coli ESBL (06-08-23 @ 17:29)      Method Type: KRISTI      -  Amikacin: S <=16      -  Amoxicillin/Clavulanic Acid: S <=8/4      -  Ampicillin: R >16 These ampicillin results predict results for amoxicillin      -  Ampicillin/Sulbactam: S 8/4 Enterobacter, Klebsiella aerogenes, Citrobacter, and Serratia may develop resistance during prolonged therapy (3-4 days)      -  Aztreonam: R >16      -  Cefazolin: R >16 For uncomplicated UTI with K. pneumoniae, E. coli, or P. mirablis: KRISTI <=16 is sensitive and KRISTI >=32 is resistant. This also predicts results for oral agents cefaclor, cefdinir, cefpodoxime, cefprozil, cefuroxime axetil, cephalexin and locarbef for uncomplicated UTI. Note that some isolates may be susceptible to these agents while testing resistant to cefazolin.      -  Cefepime: R >16      -  Ceftriaxone: R >32 Enterobacter, Klebsiella aerogenes, Citrobacter, and Serratia may develop resistance during prolonged therapy      -  Cefuroxime: R >16      -  Ciprofloxacin: R >2      -  Ertapenem: S <=0.5      -  Gentamicin: S <=2      -  Imipenem: S <=1      -  Levofloxacin: R >4      -  Meropenem: S <=1      -  Nitrofurantoin: S <=32 Should not be used to treat pyelonephritis      -  Piperacillin/Tazobactam: S <=8      -  Tobramycin: S <=2      -  Trimethoprim/Sulfamethoxazole: S <=0.5/9.5

## 2023-06-09 NOTE — PROGRESS NOTE ADULT - ASSESSMENT
89M w/ PMH dementia, bedbound, BPH w/ chronic Valencia, Afib (on Eliquis), HTN that presents to the ED due to decreased urinary output from valencia catheter. Catheter exchanged by ED on presentation. Cr 5.75 from baseline 1.30 in November.    - Labs reviewed - Cr 3.28 from 4.16  - Renal US images reviewed - improved R hydronephrosis, persistent L hydronephrosis  - No urologic intervention indicated  - Continue Valencia catheter  - Continue to trend Cr  - Continue IV antibiotics per ID  - Follow up final repeat blood cultures (NGTD)  - Follow up nephrology recommendations

## 2023-06-10 LAB
ANION GAP SERPL CALC-SCNC: 6 MMOL/L — SIGNIFICANT CHANGE UP (ref 5–17)
BUN SERPL-MCNC: 82 MG/DL — HIGH (ref 7–18)
CALCIUM SERPL-MCNC: 8.5 MG/DL — SIGNIFICANT CHANGE UP (ref 8.4–10.5)
CHLORIDE SERPL-SCNC: 122 MMOL/L — HIGH (ref 96–108)
CO2 SERPL-SCNC: 19 MMOL/L — LOW (ref 22–31)
CREAT SERPL-MCNC: 2.99 MG/DL — HIGH (ref 0.5–1.3)
CRP SERPL-MCNC: 92 MG/L — HIGH
EGFR: 19 ML/MIN/1.73M2 — LOW
ERYTHROCYTE [SEDIMENTATION RATE] IN BLOOD: 97 MM/HR — HIGH (ref 0–20)
GLUCOSE SERPL-MCNC: 107 MG/DL — HIGH (ref 70–99)
HCT VFR BLD CALC: 27.7 % — LOW (ref 39–50)
HGB BLD-MCNC: 8.8 G/DL — LOW (ref 13–17)
MCHC RBC-ENTMCNC: 25.3 PG — LOW (ref 27–34)
MCHC RBC-ENTMCNC: 31.8 GM/DL — LOW (ref 32–36)
MCV RBC AUTO: 79.6 FL — LOW (ref 80–100)
NRBC # BLD: 0 /100 WBCS — SIGNIFICANT CHANGE UP (ref 0–0)
PLATELET # BLD AUTO: 284 K/UL — SIGNIFICANT CHANGE UP (ref 150–400)
POTASSIUM SERPL-MCNC: 3.8 MMOL/L — SIGNIFICANT CHANGE UP (ref 3.5–5.3)
POTASSIUM SERPL-SCNC: 3.8 MMOL/L — SIGNIFICANT CHANGE UP (ref 3.5–5.3)
RBC # BLD: 3.48 M/UL — LOW (ref 4.2–5.8)
RBC # FLD: 17.8 % — HIGH (ref 10.3–14.5)
SODIUM SERPL-SCNC: 147 MMOL/L — HIGH (ref 135–145)
WBC # BLD: 11.8 K/UL — HIGH (ref 3.8–10.5)
WBC # FLD AUTO: 11.8 K/UL — HIGH (ref 3.8–10.5)

## 2023-06-10 PROCEDURE — 99232 SBSQ HOSP IP/OBS MODERATE 35: CPT

## 2023-06-10 RX ADMIN — APIXABAN 2.5 MILLIGRAM(S): 2.5 TABLET, FILM COATED ORAL at 06:07

## 2023-06-10 RX ADMIN — SENNA PLUS 2 TABLET(S): 8.6 TABLET ORAL at 21:27

## 2023-06-10 RX ADMIN — ERTAPENEM SODIUM 100 MILLIGRAM(S): 1 INJECTION, POWDER, LYOPHILIZED, FOR SOLUTION INTRAMUSCULAR; INTRAVENOUS at 19:36

## 2023-06-10 RX ADMIN — PANTOPRAZOLE SODIUM 40 MILLIGRAM(S): 20 TABLET, DELAYED RELEASE ORAL at 06:07

## 2023-06-10 RX ADMIN — SODIUM CHLORIDE 85 MILLILITER(S): 9 INJECTION, SOLUTION INTRAVENOUS at 09:58

## 2023-06-10 RX ADMIN — POLYETHYLENE GLYCOL 3350 17 GRAM(S): 17 POWDER, FOR SOLUTION ORAL at 12:58

## 2023-06-10 RX ADMIN — APIXABAN 2.5 MILLIGRAM(S): 2.5 TABLET, FILM COATED ORAL at 18:16

## 2023-06-10 RX ADMIN — SODIUM CHLORIDE 100 MILLILITER(S): 9 INJECTION, SOLUTION INTRAVENOUS at 19:37

## 2023-06-10 NOTE — DISCHARGE NOTE PROVIDER - PROVIDER TOKENS
PROVIDER:[TOKEN:[[id number]]],PROVIDER:[TOKEN:[[id number]]],PROVIDER:[TOKEN:[[id number]]] PROVIDER:[TOKEN:[[id number]],FOLLOWUP:[Routine]],PROVIDER:[TOKEN:[[id number]],FOLLOWUP:[Routine]],PROVIDER:[TOKEN:[[id number]],FOLLOWUP:[1 month]],FREE:[LAST:[Home Hospice team],PHONE:[(   )    -],FAX:[(   )    -],FOLLOWUP:[1-3 days]]

## 2023-06-10 NOTE — PROGRESS NOTE ADULT - SUBJECTIVE AND OBJECTIVE BOX
[facility] NEPHROLOGY- PROGRESS NOTE    Patient is a 89yo Male with h/o dementia, bedbound, BPH w/ chronic valencia, afib (on eliquis), HTN that presents to the ED due to decreased urinary output from valencia catheter. Pt a/w TERENCE. Nephrology consulted for Elevated serum creatinine.  CT abd/pelvis with nodular hepatic parenchymal contour suggestive of underlying hepatocellular dz/cirrhosis. Moderate b/l hydro with hydroureter identified to the level of the urinary bladder. Compleletly decompressed bladder by valencia with bladder wall thickening    Hospital Medications: Medications reviewed.  REVIEW OF SYSTEMS: Unreliable due to dementia: not answering questions today    VITALS:  T(F): 98.4 (06-10-23 @ 13:10), Max: 98.4 (06-10-23 @ 13:10)  HR: 74 (06-10-23 @ 13:10)  BP: 101/52 (06-10-23 @ 13:10)  RR: 18 (06-10-23 @ 13:10)  SpO2: 98% (06-10-23 @ 13:10)  Wt(kg): --     @ 07:01  -  06-10 @ 07:00  --------------------------------------------------------  IN: 0 mL / OUT: 2840 mL / NET: -2840 mL      PHYSICAL EXAM:  Gen: NAD,   HEENT: anicteric  Neck: no JVD  Cards: RRR, +S1/S2, no M/G/R  Resp: CTA B/L  GI: soft, NT/ND, NABS  : +valencia  Extremities: no LE edema B/L;     LABS:  06-10    147<H>  |  122<H>  |  82<H>  ----------------------------<  107<H>  3.8   |  19<L>  |  2.99<H>    Ca    8.5      10 Leon 2023 05:10      Creatinine Trend: 2.99 <--, 3.28 <--, 4.16 <--, 5.06 <--, 5.75 <--, 6.11 <--, 5.89 <--                        8.8    11.80 )-----------( 284      ( 10 Leon 2023 05:10 )             27.7     Urine Studies:  Urinalysis Basic - ( 2023 22:10 )    Color: Yellow / Appearance: Slightly Turbid / S.010 / pH:   Gluc:  / Ketone: Negative  / Bili: Negative / Urobili: Negative   Blood:  / Protein: 100 mg/dL / Nitrite: Negative   Leuk Esterase: Moderate / RBC: 10-25 /HPF / WBC 11-25 /HPF   Sq Epi:  / Non Sq Epi:  / Bacteria: Moderate /HPF      Creatinine, Random Urine: 42 mg/dL ( @ 17:56)  Sodium, Random Urine: 35 mmol/L ( @ 17:56)

## 2023-06-10 NOTE — DISCHARGE NOTE PROVIDER - NSDCCPCAREPLAN_GEN_ALL_CORE_FT
PRINCIPAL DISCHARGE DIAGNOSIS  Diagnosis: Sepsis secondary to UTI  Assessment and Plan of Treatment: Your urine culture was positive for E. coli urinary tract infection.  While in the hospital you were seen by an Infectious Disease doctor and treated w/ an IV antibiotic.    HOME CARE INSTRUCTIONS:  - Drink enough water and fluids to keep your urine clear or pale yellow.  - Avoid caffeine, tea, and carbonated beverages. They tend to irritate your bladder.  SEEK MEDICAL CARE IF:  You have back pain.  You develop a fever.  Your symptoms do not begin to resolve within 3 days.  SEEK IMMEDIATE MEDICAL CARE IF:  You have severe back pain or lower abdominal pain.  You develop chills.  You have nausea or vomiting.  You have continued burning or discomfort with urination.  Please follow up with your PCP within one week.      SECONDARY DISCHARGE DIAGNOSES  Diagnosis: Obstructive uropathy  Assessment and Plan of Treatment: On hospital admission your chronic valencia had gross blood.  Your valencia was exchanged in ED on 6/5.  While in the hospital you were seen by a Urologist with the recommendation to continue with your chronic valencia.    Diagnosis: Hematuria  Assessment and Plan of Treatment: On hospital admission your chronic valencia had gross blood.  Your valencia was exchanged in ED on 6/5.  While in the hospital you were seen by a Urologist with the recommendation to continue with your chronic valencia.  You are anemic and your hemoglobin was monitored and you remain anemic......   Please follow up with your Urologist or Urologist-Dr. Chris for continued management of your chronic valencia.    Diagnosis: Acute kidney injury superimposed on CKD  Assessment and Plan of Treatment: You presented to the hospital with decreased kidney function.  You have a history of CKD, your baseline Scr=1.3.  However, on admission your Scr=5.89.  While hospitalized you had a CT A/P which showed bilateral hydronephrosis and decompressed bladder.  On 6/8 you had a renal US which showed unchanged right hydronephrosis but slight improvement in left hydronephrosis.    You were seen by a Nephrologist and received IV fluids.  Your kidney function was monitored and has been improving......your Scr=.............      Diagnosis: Gram-negative bacteremia  Assessment and Plan of Treatment: You presented to the hospital with a chronic valencia (since 11/2022) and sepsis.  Your valencia was exchanged in ED on 6/5.  Your blood culture was positive for ESBL E. coli.  While in the hospital you were seen by an Infectious Disease doctor and treated with an IV antibiotic.  Your blood cultures were repeated on 6/7 & 6/8 and remained negative.  Please follow up with your PCP within three days to discuss this information and for continued management.        Diagnosis: Acute metabolic encephalopathy  Assessment and Plan of Treatment: You presented to the hospital with confusion which is most likely multifactorial, due to an acute E. coli infection, TERENCE, & worsening dementia.  While in the hospital you received supportive care.        Diagnosis: Atrial fibrillation  Assessment and Plan of Treatment: You have a history of Afib and in the past you've taken Toprol.  While in the hospital your blood pressure and HR remained stable and your medication was not given to you.  Please follow up with your PCP and/or Cardiologist within three days to discuss this information and to determine if you should resume your Toprol.        Diagnosis: Dementia  Assessment and Plan of Treatment: You have a history of dementia and recevied supportive care.  HOME CARE INSTRUCTIONS  - Create a safe environment.  - Remove the locks on bathroom doors to prevent the person from accidentally locking himself or herself in.  - Use childproof latches on kitchen cabinets and any place where cleaning supplies, chemicals, or alcohol are kept.  - Use childproof covers in unused electrical outlets.  - Label medicines and keep them locked up.  - Secure knives, lighters, matches, power tools, and guns, and keep these items out of reach.  - Keep the house free from clutter. Remove rugs or anything that might contribute to a fall.  - Install grab rails as needed.  - Create a calm, quiet environment.  SEEK MEDICAL CARE IF:  - New behavioral problems start such as moodiness, aggressiveness, or seeing things that are not there (hallucinations).  - New problem with balance, speech, or falling a lot.  - New problem or difficulty swallowing develops.       PRINCIPAL DISCHARGE DIAGNOSIS  Diagnosis: Sepsis secondary to UTI  Assessment and Plan of Treatment: Your urine culture was positive for E. coli urinary tract infection.  While in the hospital you were seen by an Infectious Disease doctor and treated w/ an IV antibiotic.    HOME CARE INSTRUCTIONS:  - Drink enough water and fluids to keep your urine clear or pale yellow.  - Avoid caffeine, tea, and carbonated beverages. They tend to irritate your bladder.  SEEK MEDICAL CARE IF:  You have back pain.  You develop a fever.  Your symptoms do not begin to resolve within 3 days.  SEEK IMMEDIATE MEDICAL CARE IF:  You have severe back pain or lower abdominal pain.  You develop chills.  You have nausea or vomiting.  You have continued burning or discomfort with urination.  Please follow up with your PCP within one week.      SECONDARY DISCHARGE DIAGNOSES  Diagnosis: Obstructive uropathy  Assessment and Plan of Treatment: On hospital admission your chronic valencia had gross blood.  Your valencia was exchanged in ED on 6/5.  While in the hospital you were seen by a Urologist with the recommendation to continue with your chronic valencia.    Diagnosis: Hematuria  Assessment and Plan of Treatment: On hospital admission your chronic valencia had gross blood.  Your valencia was exchanged in ED on 6/5 and there has not been blood in your valencia since admission.  While in the hospital you were seen by a Urologist with the recommendation to continue with your chronic valenica.  You are anemic and your hemoglobin was monitored and you remain anemic......   Please follow up with your Urologist or Urologist-Dr. Chris for continued management of your chronic valencia.    Diagnosis: Acute kidney injury superimposed on CKD  Assessment and Plan of Treatment: You presented to the hospital with decreased kidney function.  You have a history of CKD, your baseline Scr=1.3.  However, on admission your Scr=5.89.  While hospitalized you had a CT A/P which showed bilateral hydronephrosis and decompressed bladder.  On 6/8 you had a renal US which showed unchanged right hydronephrosis but slight improvement in left hydronephrosis.    You were seen by a Nephrologist and received IV fluids.  Your kidney function was monitored and has been improving......your Scr=.............      Diagnosis: Gram-negative bacteremia  Assessment and Plan of Treatment: You presented to the hospital with a chronic valencia (since 11/2022) and sepsis.  Your valencia was exchanged in ED on 6/5.  Your blood culture was positive for ESBL E. coli.  While in the hospital you were seen by an Infectious Disease doctor and treated with an IV antibiotic.  Your blood cultures were repeated on 6/7 and the result was positive.  On 6/8 your blood cultures were repeated and remained negative.  Please follow up with your PCP within three days to discuss this information and for continued management.        Diagnosis: Acute metabolic encephalopathy  Assessment and Plan of Treatment: You presented to the hospital with confusion which is most likely multifactorial, due to an acute E. coli infection, TERENCE, & worsening dementia.  While in the hospital you received supportive care.        Diagnosis: Atrial fibrillation  Assessment and Plan of Treatment: You have a history of Afib and in the past you've taken Toprol.  While in the hospital your blood pressure and HR remained stable and your medication was not given to you.  Please follow up with your PCP and/or Cardiologist within three days to discuss this information and to determine if you should resume your Toprol.        Diagnosis: Dementia  Assessment and Plan of Treatment: You have a history of dementia and recevied supportive care.  HOME CARE INSTRUCTIONS  - Create a safe environment.  - Remove the locks on bathroom doors to prevent the person from accidentally locking himself or herself in.  - Use childproof latches on kitchen cabinets and any place where cleaning supplies, chemicals, or alcohol are kept.  - Use childproof covers in unused electrical outlets.  - Label medicines and keep them locked up.  - Secure knives, lighters, matches, power tools, and guns, and keep these items out of reach.  - Keep the house free from clutter. Remove rugs or anything that might contribute to a fall.  - Install grab rails as needed.  - Create a calm, quiet environment.  SEEK MEDICAL CARE IF:  - New behavioral problems start such as moodiness, aggressiveness, or seeing things that are not there (hallucinations).  - New problem with balance, speech, or falling a lot.  - New problem or difficulty swallowing develops.       PRINCIPAL DISCHARGE DIAGNOSIS  Diagnosis: Sepsis secondary to UTI  Assessment and Plan of Treatment: Your urine culture was positive for E. coli urinary tract infection.  While in the hospital you were seen by an Infectious Disease doctor and treated w/ an IV antibiotic.    HOME CARE INSTRUCTIONS:  - Drink enough water and fluids to keep your urine clear or pale yellow.  - Avoid caffeine, tea, and carbonated beverages. They tend to irritate your bladder.  SEEK MEDICAL CARE IF:  You have back pain.  You develop a fever.  Your symptoms do not begin to resolve within 3 days.  SEEK IMMEDIATE MEDICAL CARE IF:  You have severe back pain or lower abdominal pain.  You develop chills.  You have nausea or vomiting.  You have continued burning or discomfort with urination.  continue follow up hospice team upon discharge.      SECONDARY DISCHARGE DIAGNOSES  Diagnosis: Obstructive uropathy  Assessment and Plan of Treatment: On hospital admission your chronic valencia had gross blood.  Your valencia was exchanged in ED on 6/5.  While in the hospital you were seen by a Urologist with the recommendation to continue with your chronic valencia.    Diagnosis: Hematuria  Assessment and Plan of Treatment: On hospital admission your chronic valencia had gross blood.  Your valencia was exchanged in ED on 6/5 and there has not been blood in your valencia since admission.  While in the hospital you were seen by a Urologist with the recommendation to continue with your chronic valencia.  You are anemic and your hemoglobin was monitored and you remain anemic. Hemoglobin stable-8.4. No frequent labs as pt is for comfort care, home hospice.   Please follow up with your Urologist or Urologist-Dr. Chris for continued management of your chronic valencia.    Diagnosis: Atrial fibrillation  Assessment and Plan of Treatment: You have a history of Afib and in the past you've taken Toprol.  While in the hospital your blood pressure and HR remained in low normal but stable and your medication was not given to you.  continue hold home meds for now.   Follow up with hospice team on discharge.       Diagnosis: Acute kidney injury superimposed on CKD  Assessment and Plan of Treatment: You presented to the hospital with decreased kidney function.  You have a history of CKD, your baseline Scr=1.3.  However, on admission your Scr=5.89.  While hospitalized you had a CT A/P which showed bilateral hydronephrosis and decompressed bladder.  On 6/8 you had a renal US which showed unchanged right hydronephrosis but slight improvement in left hydronephrosis.    You were seen by a Nephrologist and received IV fluids.  Your kidney function was monitored and has been improving slowly. Scr 2.29 (6/14).   continue hospice care at home.       Diagnosis: Acute metabolic encephalopathy  Assessment and Plan of Treatment: You presented to the hospital with confusion which is most likely multifactorial, due to an acute E. coli infection, TERENCE, & worsening dementia.  While in the hospital you received supportive care.    continue hospice care at home.       Diagnosis: Dementia  Assessment and Plan of Treatment: You have a history of dementia and recevied supportive care.  HOME CARE INSTRUCTIONS  - Create a safe environment.  - Remove the locks on bathroom doors to prevent the person from accidentally locking himself or herself in.  - Use childproof latches on kitchen cabinets and any place where cleaning supplies, chemicals, or alcohol are kept.  - Use childproof covers in unused electrical outlets.  - Label medicines and keep them locked up.  - Secure knives, lighters, matches, power tools, and guns, and keep these items out of reach.  - Keep the house free from clutter. Remove rugs or anything that might contribute to a fall.  - Install grab rails as needed.  - Create a calm, quiet environment.  SEEK MEDICAL CARE IF:  - New behavioral problems start such as moodiness, aggressiveness, or seeing things that are not there (hallucinations).  - New problem with balance, speech, or falling a lot.  - New problem or difficulty swallowing develops.      Diagnosis: Gram-negative bacteremia  Assessment and Plan of Treatment: You presented to the hospital with a chronic valencia (since 11/2022) and sepsis.  Your valencia was exchanged in ED on 6/5.  Your blood culture was positive for ESBL E. coli.  While in the hospital you were seen by an Infectious Disease doctor and treated with an IV antibiotic.  Your blood cultures were repeated on 6/7 and the result was positive.  On 6/8 your blood cultures were repeated and remained negative. Follow up with hospice team at home and /or PMD.

## 2023-06-10 NOTE — DISCHARGE NOTE PROVIDER - HOSPITAL COURSE
89 year old, Male, from home has HHA 8hrs/7days with PMH of Bedbound, Dementia, BPH w/ chronic valencia, Afib (on Eliquis), & HTN.  Presented to the ED due to decreased urinary output from valencia catheter. History obtained from wife, Piper Han, patient unable to provide history in setting of dementia.  Wife stated he was able to talk at baseline, and was AAOx3 last week. He came home from rehab 5 days ago, after being there for 6.5 months, and this was not his baseline. Wife reported since patient got home 5 days ago, patient hasn't been himself.   Admitted for Sepsis due to Bacteremia and UTI-ESBL E. coli.        THIS IS A BRIEF SUMMARY.  FOR A FULL HOSPITAL COURSE SEE MEDICAL RECORDS OR [facility] PORTAL      Patient is medically optimized and cleared by Attending for discharge.     89 year old, Male, from home has HHA 8hrs/7days with PMH of Bedbound, Dementia, BPH w/ chronic valencia, Afib (on Eliquis), & HTN.  Presented to the ED due to decreased urinary output from valencia catheter. History obtained from wife, Piper Han, patient unable to provide history in setting of dementia.  Wife stated he was able to talk at baseline, and was AAOx3 last week. He came home from rehab 5 days ago, after being there for 6.5 months, and this was not his baseline. Wife reported since patient got home 5 days ago, patient hasn't been himself.  Admitted for Sepsis due to Bacteremia and UTI-ESBL E. coli.        THIS IS A BRIEF SUMMARY.  FOR A FULL HOSPITAL COURSE SEE MEDICAL RECORDS OR [facility] PORTAL      Patient is medically optimized and cleared by Attending for discharge.     89 year old, Male, from home has HHA 8hrs/7days with PMH of Bedbound, Dementia, BPH w/ chronic valencia, Afib (on Eliquis), & HTN.  Presented to the ED due to decreased urinary output from valencia catheter. History obtained from wife, Piper Han, patient unable to provide history in setting of dementia.  Wife stated he was able to talk at baseline, and was AAOx3 last week. He came home from rehab 5 days ago, after being there for 6.5 months, and this was not his baseline. Wife reported since patient got home 5 days ago, patient hasn't been himself.  Admitted for Sepsis due to Bacteremia and UTI-ESBL E. coli.        THIS IS A BRIEF SUMMARY.  FOR A FULL HOSPITAL COURSE SEE MEDICAL RECORDS OR [facility] PORTAL      Patient is medically optimized and cleared by Attending for discharge.      a/o 6/13 89 year old, Male, from home has HHA 8hrs/7days with PMH of Bedbound, Dementia, BPH w/ chronic valencia, Afib (on Eliquis), & HTN.  Presented to the ED due to decreased urinary output from valencia catheter. History obtained from wife, Piper Han, patient unable to provide history in setting of dementia.  Wife stated he was able to talk at baseline, and was AAOx3 last week. He came home from rehab 5 days ago, after being there for 6.5 months, and this was not his baseline. Wife reported since patient got home 5 days ago, patient hasn't been himself.  Admitted for Sepsis due to Bacteremia and UTI-ESBL E. coli.    Treated with Ertapenem. Nephrology and urology consulted for TERENCE/ Bilateral hydronephrosis. on chronic valencia.   Renal US images reviewed - improved R hydronephrosis, persistent L hydronephrosis. No urologic intervention indicated per urology. Renal function slowly improving and now stabilized with good urine o/p.  Pt is monitored for hypotension, s/p IVF. BP low normal but stable. Palliative following for further goals of care. DNR/DNI. plan to DC on home hospice.     Patient is medically optimized for discharge home with home hospice. Discussed plan of care with attending.   THIS IS A BRIEF SUMMARY.  FOR A FULL HOSPITAL COURSE SEE MEDICAL RECORDS OR [facility] PORTAL.   89 year old, Male, from home has HHA 8hrs/7days with PMH of Bedbound, Dementia, BPH w/ chronic valencia, Afib (on Eliquis), & HTN.  Presented to the ED due to decreased urinary output from valencia catheter. History obtained from wife, Piper Han, patient unable to provide history in setting of dementia.  Wife stated he was able to talk at baseline, and was AAOx3 last week. He came home from rehab 5 days ago, after being there for 6.5 months, and this was not his baseline. Wife reported since patient got home 5 days ago, patient hasn't been himself.  Admitted for Sepsis due to Bacteremia and UTI-ESBL E. coli.    Treated with Ertapenem. Nephrology and urology consulted for TERENCE/ Bilateral hydronephrosis. on chronic valencia. Evaluated by SLP for swallowing, high risks of aspiration. comfort feed only.   Renal US images reviewed - improved R hydronephrosis, persistent L hydronephrosis. No urologic intervention indicated per urology. Renal function slowly improving and now stabilized with good urine o/p.  Pt is monitored for hypotension, s/p IVF. BP low normal but stable. Palliative following for further goals of care. DNR/DNI. plan to DC on home hospice.     Patient is medically optimized for discharge home with home hospice. Discussed plan of care with attending.   THIS IS A BRIEF SUMMARY.  FOR A FULL HOSPITAL COURSE SEE MEDICAL RECORDS OR [facility] PORTAL.   89 year old, Male, from home has HHA 8hrs/7days with PMH of Bedbound, Dementia, BPH w/ chronic valencia, Afib (on Eliquis), & HTN.  Presented to the ED due to decreased urinary output from valencia catheter. History obtained from wife, Piper Han, patient unable to provide history in setting of dementia.  Wife stated he was able to talk at baseline, and was AAOx3 last week. He came home from rehab 5 days ago, after being there for 6.5 months, and this was not his baseline. Wife reported since patient got home 5 days ago, patient hasn't been himself.  Admitted for Sepsis due to Bacteremia and UTI-ESBL E. coli.    Treated with Ertapenem. Nephrology and urology consulted for TERENCE/ Bilateral hydronephrosis. on chronic valencia. Evaluated by SLP for swallowing, high risks of aspiration. comfort feed only.   Renal US images reviewed - improved R hydronephrosis, persistent L hydronephrosis. No urologic intervention indicated per urology. Renal function slowly improving and now stabilized with good urine o/p.  Pt is monitored for hypotension, s/p IVF. BP low normal but stable. Palliative following for further goals of care. DNR/DNI. DC on home hospice.     Patient is medically optimized for discharge home with home hospice. Discussed plan of care with attending.   THIS IS A BRIEF SUMMARY.  FOR A FULL HOSPITAL COURSE SEE MEDICAL RECORDS OR [facility] PORTAL.

## 2023-06-10 NOTE — DISCHARGE NOTE PROVIDER - CARE PROVIDER_API CALL
Robbie Riggs  [address] [place] [address]  Phone: ([phone]  Fax: ()-  Follow Up Time:     Quentin Chris  Urology  [address] [place] [address]  Phone: [phone]  Fax: [phone]  Follow Up Time:     Carolann Regalado  Nephrology  [address]  [place], [place] [id number]  Phone: [phone]  Fax: [phone]  Follow Up Time:    Robbie Riggs  [address] [place] [address]  Phone: ()-  Fax: ()-  Follow Up Time: Routine    Quentin Chris  Urology  [address] [place] [address]  Phone: [phone]  Fax: [phone]  Follow Up Time: Routine    Carolann Regalado  Nephrology  [address]  [place], [place] [id number]  Phone: [phone]  Fax: [phone]  Follow Up Time: 1 month    Home Hospice team,   Phone: (   )    -  Fax: (   )    -  Follow Up Time: 1-3 days

## 2023-06-10 NOTE — PROGRESS NOTE ADULT - SUBJECTIVE AND OBJECTIVE BOX
[facility] Medicine  Patient is a 89y old  Male who presents with a chief complaint of Urinary retention (09 Jun 2023 12:54)      SUBJECTIVE / OVERNIGHT EVENTS:  No acute events over night. Unable to obtain accurate ROS due to mental status/dementia but appears comfortable and denies any pain.      MEDICATIONS  (STANDING):  apixaban 2.5 milliGRAM(s) Oral two times a day  dextrose 5%. 1000 milliLiter(s) (100 mL/Hr) IV Continuous <Continuous>  ertapenem  IVPB      ertapenem  IVPB 500 milliGRAM(s) IV Intermittent every 24 hours  pantoprazole    Tablet 40 milliGRAM(s) Oral before breakfast  polyethylene glycol 3350 17 Gram(s) Oral daily  senna 2 Tablet(s) Oral at bedtime    MEDICATIONS  (PRN):          OBJECTIVE:  Vital Signs Last 24 Hrs  T(C): 36.3 (10 Leon 2023 05:17), Max: 37.1 (09 Jun 2023 13:35)  T(F): 97.4 (10 Leon 2023 05:17), Max: 98.7 (09 Jun 2023 13:35)  HR: 103 (10 Leon 2023 05:17) (100 - 103)  BP: 128/76 (10 Leon 2023 05:17) (119/63 - 129/77)  BP(mean): 76 (09 Jun 2023 20:08) (76 - 76)  RR: 18 (10 Leon 2023 05:17) (18 - 18)  SpO2: 96% (10 Leon 2023 05:17) (96% - 99%)    Parameters below as of 10 Leon 2023 05:17  Patient On (Oxygen Delivery Method): room air      PHYSICAL EXAM:  GENERAL: NAD  HEAD:  Atraumatic, Normocephalic  EYES: conjunctiva and sclera clear  NECK: Supple, No JVD  CHEST/LUNG: Clear to auscultation bilaterally; No wheeze  HEART: Regular rate and rhythm; No murmurs, rubs, or gallops  ABDOMEN: Soft, Nontender, Nondistended; Bowel sounds present  EXTREMITIES:  No clubbing, cyanosis, or edema  PSYCH: AAOx1 but more alert compared to yesterday  NEUROLOGY: non-focal  SKIN: No rashes or lesions    CAPILLARY BLOOD GLUCOSE        I&O's Summary    09 Jun 2023 07:01  -  10 Leon 2023 07:00  --------------------------------------------------------  IN: 0 mL / OUT: 2840 mL / NET: -2840 mL              LABS:                        8.8    11.80 )-----------( 284      ( 10 Leon 2023 05:10 )             27.7     06-10    147<H>  |  122<H>  |  82<H>  ----------------------------<  107<H>  3.8   |  19<L>  |  2.99<H>    Ca    8.5      10 Leon 2023 05:10                  Culture - Blood (collected 08 Jun 2023 06:39)  Source: .Blood Blood  Preliminary Report (09 Jun 2023 15:02):    No growth to date.    Culture - Blood (collected 08 Jun 2023 06:20)  Source: .Blood Blood  Preliminary Report (09 Jun 2023 15:02):    No growth to date.        RADIOLOGY & ADDITIONAL TESTS:

## 2023-06-10 NOTE — PROGRESS NOTE ADULT - ASSESSMENT
89 year old man with dementia, BPH with chronic indwelling valencia catheter, A-fib on Eliquis, HTN here with blocked catheter and urinary retention. Admit for Sepsis 2/2 ESBL bacteremia, Chronic CAUTI, Acute encephalopathy 2/2 infection and uremia, TERENCE on CKD 3    # Sepsis 2/2 ESBL bacteremia  # Complicated UTI/ CAUTI  # Acute encephalopathy  # Hematuria - traumatic Valencia vs blood thinner induced?  # TERENCE on CKD 3  # Obstructive uropathy and pre-renal TERENCE  # NAGMA  # Hyponatremia  -Valencia cathter changed in ED  - Continue ertapenem     - ID consulted and discussed plan to continue ertapenem  - f/u repeat Bcx drawn NGTD  - Repeat Renal US showing continued hydronephrosis  - f/u Urology recs  -  c/w miralax/senna for BM  - Monitor mental status  - DVT ppx

## 2023-06-10 NOTE — DISCHARGE NOTE PROVIDER - NSDCMRMEDTOKEN_GEN_ALL_CORE_FT
Eliquis 5 mg oral tablet: 1 tab(s) orally 2 times a day  PreserVision AREDS 2 oral tablet, chewable: 1 tab(s) orally 2 times a day  terazosin 2 mg oral capsule: 1 orally once a day   Eliquis 5 mg oral tablet: 1 tab(s) orally 2 times a day  pantoprazole 40 mg oral delayed release tablet: 1 tab(s) orally once a day (before a meal)  polyethylene glycol 3350 oral powder for reconstitution: 17 gram(s) orally once a day  PreserVision AREDS 2 oral tablet, chewable: 1 tab(s) orally 2 times a day  senna leaf extract oral tablet: 2 tab(s) orally once a day (at bedtime)   bisacodyl 10 mg rectal suppository: 1 suppository(ies) rectal once a day As needed Constipation  Eliquis 5 mg oral tablet: 1 tab(s) orally 2 times a day  pantoprazole 40 mg oral delayed release tablet: 1 tab(s) orally once a day (before a meal)  polyethylene glycol 3350 oral powder for reconstitution: 17 gram(s) orally once a day  PreserVision AREDS 2 oral tablet, chewable: 1 tab(s) orally 2 times a day  senna leaf extract oral tablet: 2 tab(s) orally once a day (at bedtime)   bisacodyl 10 mg rectal suppository: 1 suppository(ies) rectal once a day As needed Constipation  Eliquis 5 mg oral tablet: 1 tab(s) orally 2 times a day  PreserVision AREDS 2 oral tablet, chewable: 1 tab(s) orally 2 times a day  senna leaf extract oral tablet: 2 tab(s) orally once a day (at bedtime)

## 2023-06-10 NOTE — PROGRESS NOTE ADULT - ASSESSMENT
Patient is a 89yo Male with h/o dementia, bedbound, BPH w/ chronic valencia, afib (on eliquis), HTN that presents to the ED due to decreased urinary output from valencia catheter. Pt a/w TERENCE. Nephrology consulted for Elevated serum creatinine.  CT abd/pelvis with nodular hepatic parenchymal contour suggestive of underlying hepatocellular dz/cirrhosis. Moderate b/l hydro with hydroureter identified to the level of the urinary bladder. Compleletly decompressed bladder by valencia with bladder wall thickening    1. TERENCE- unknown baseline SCr. Previous SCr 1.66 on 5/29/23 & SCr 1.71 on 5/22/23. TERENCE in the setting of obstructive uropathy with ?cirrhosis with sepsis 2/2 UTI/ relative hypotension. Urology following.    UA active in the setting of infection. FeNa 3.6%; ?obstructive. Renal function slowly improving with good urine o/p. Recc to increase D5 from 85ml/hr to 100ml/hr  due to hyperchloremic hypernatremia.    Repeat Renal US with persistent moderate Rt hydro  and mild left hydro (slight improvement); f/u Urology  Strict I/Os. Avoid nephrotoxins/ NSAIDs/ RCA. Monitor BMP.  2. b/l hydronephrosis- Moderate b/l hydro with hydroureter with compressed bladder by valencia. Urology following. c/w good bowel regimen  3. Sepsis 2/2 UTI- (leukocytosis with tachycardia) +UA. Pt on Invanz. UCx Ecoli. BCx +ESBL Ecoli. BCx 6/8- NG  4. Hypotension- resolving. BP acceptable. s/p albumin gtt 25% in 100ml q 8hrs x 48 hrs.  Monitor BP    [facility] NEPHROLOGY  Avelino Fair M.D.  Harrison Barajas D.O.  Carolann Regalado M.D.  Janine Ackerman, MSN, ANP-C  [phone]  Fax: [phone] [place] [address], [address]  [place] [address]

## 2023-06-10 NOTE — DISCHARGE NOTE PROVIDER - ATTENDING DISCHARGE PHYSICAL EXAMINATION:
GENERAL: NAD, cachectic  HEENT: Normocephalic;  conjunctivae and sclerae clear; moist mucous membranes;   NECK : supple  CHEST/LUNG: Clear to auscultation bilaterally with fair  air entry   HEART: S1 S2  regular; no murmurs, gallops or rubs  ABDOMEN: Soft, Nontender, Nondistended; Bowel sounds present  EXTREMITIES: no cyanosis; no edema; no calf tenderness  : Vogel in place, clear urine   SKIN: warm and dry; no rash  NERVOUS SYSTEM:  Awake on stimuli, not following commands. lethargic

## 2023-06-11 LAB
ANION GAP SERPL CALC-SCNC: 7 MMOL/L — SIGNIFICANT CHANGE UP (ref 5–17)
ANISOCYTOSIS BLD QL: SLIGHT — SIGNIFICANT CHANGE UP
BASOPHILS # BLD AUTO: 0 K/UL — SIGNIFICANT CHANGE UP (ref 0–0.2)
BASOPHILS NFR BLD AUTO: 0 % — SIGNIFICANT CHANGE UP (ref 0–2)
BUN SERPL-MCNC: 68 MG/DL — HIGH (ref 7–18)
CALCIUM SERPL-MCNC: 8.2 MG/DL — LOW (ref 8.4–10.5)
CHLORIDE SERPL-SCNC: 119 MMOL/L — HIGH (ref 96–108)
CO2 SERPL-SCNC: 19 MMOL/L — LOW (ref 22–31)
CREAT SERPL-MCNC: 2.6 MG/DL — HIGH (ref 0.5–1.3)
EGFR: 23 ML/MIN/1.73M2 — LOW
ELLIPTOCYTES BLD QL SMEAR: SLIGHT — SIGNIFICANT CHANGE UP
EOSINOPHIL # BLD AUTO: 0.77 K/UL — HIGH (ref 0–0.5)
EOSINOPHIL NFR BLD AUTO: 6 % — SIGNIFICANT CHANGE UP (ref 0–6)
GLUCOSE SERPL-MCNC: 91 MG/DL — SIGNIFICANT CHANGE UP (ref 70–99)
HCT VFR BLD CALC: 29 % — LOW (ref 39–50)
HGB BLD-MCNC: 9.3 G/DL — LOW (ref 13–17)
LYMPHOCYTES # BLD AUTO: 1.42 K/UL — SIGNIFICANT CHANGE UP (ref 1–3.3)
LYMPHOCYTES # BLD AUTO: 11 % — LOW (ref 13–44)
MANUAL SMEAR VERIFICATION: SIGNIFICANT CHANGE UP
MCHC RBC-ENTMCNC: 25.5 PG — LOW (ref 27–34)
MCHC RBC-ENTMCNC: 32.1 GM/DL — SIGNIFICANT CHANGE UP (ref 32–36)
MCV RBC AUTO: 79.7 FL — LOW (ref 80–100)
MONOCYTES # BLD AUTO: 0.39 K/UL — SIGNIFICANT CHANGE UP (ref 0–0.9)
MONOCYTES NFR BLD AUTO: 3 % — SIGNIFICANT CHANGE UP (ref 2–14)
NEUTROPHILS # BLD AUTO: 9.81 K/UL — HIGH (ref 1.8–7.4)
NEUTROPHILS NFR BLD AUTO: 76 % — SIGNIFICANT CHANGE UP (ref 43–77)
NRBC # BLD: 0 /100 — SIGNIFICANT CHANGE UP (ref 0–0)
OVALOCYTES BLD QL SMEAR: SLIGHT — SIGNIFICANT CHANGE UP
PLAT MORPH BLD: NORMAL — SIGNIFICANT CHANGE UP
PLATELET # BLD AUTO: 320 K/UL — SIGNIFICANT CHANGE UP (ref 150–400)
PLATELET COUNT - ESTIMATE: NORMAL — SIGNIFICANT CHANGE UP
POTASSIUM SERPL-MCNC: 4 MMOL/L — SIGNIFICANT CHANGE UP (ref 3.5–5.3)
POTASSIUM SERPL-SCNC: 4 MMOL/L — SIGNIFICANT CHANGE UP (ref 3.5–5.3)
RBC # BLD: 3.64 M/UL — LOW (ref 4.2–5.8)
RBC # FLD: 17.9 % — HIGH (ref 10.3–14.5)
RBC BLD AUTO: ABNORMAL
SCHISTOCYTES BLD QL AUTO: SLIGHT — SIGNIFICANT CHANGE UP
SODIUM SERPL-SCNC: 145 MMOL/L — SIGNIFICANT CHANGE UP (ref 135–145)
VARIANT LYMPHS # BLD: 4 % — SIGNIFICANT CHANGE UP (ref 0–6)
WBC # BLD: 12.91 K/UL — HIGH (ref 3.8–10.5)
WBC # FLD AUTO: 12.91 K/UL — HIGH (ref 3.8–10.5)

## 2023-06-11 PROCEDURE — 99232 SBSQ HOSP IP/OBS MODERATE 35: CPT | Mod: FS

## 2023-06-11 RX ADMIN — POLYETHYLENE GLYCOL 3350 17 GRAM(S): 17 POWDER, FOR SOLUTION ORAL at 11:47

## 2023-06-11 RX ADMIN — PANTOPRAZOLE SODIUM 40 MILLIGRAM(S): 20 TABLET, DELAYED RELEASE ORAL at 05:08

## 2023-06-11 RX ADMIN — APIXABAN 2.5 MILLIGRAM(S): 2.5 TABLET, FILM COATED ORAL at 17:51

## 2023-06-11 RX ADMIN — ERTAPENEM SODIUM 100 MILLIGRAM(S): 1 INJECTION, POWDER, LYOPHILIZED, FOR SOLUTION INTRAMUSCULAR; INTRAVENOUS at 19:40

## 2023-06-11 RX ADMIN — SODIUM CHLORIDE 100 MILLILITER(S): 9 INJECTION, SOLUTION INTRAVENOUS at 09:30

## 2023-06-11 RX ADMIN — APIXABAN 2.5 MILLIGRAM(S): 2.5 TABLET, FILM COATED ORAL at 05:08

## 2023-06-11 RX ADMIN — SODIUM CHLORIDE 100 MILLILITER(S): 9 INJECTION, SOLUTION INTRAVENOUS at 19:45

## 2023-06-11 RX ADMIN — SENNA PLUS 2 TABLET(S): 8.6 TABLET ORAL at 21:33

## 2023-06-11 NOTE — PROGRESS NOTE ADULT - SUBJECTIVE AND OBJECTIVE BOX
[facility] Medicine  Patient is a 89y old  Male who presents with a chief complaint of Urinary retention (09 Jun 2023 12:54)      SUBJECTIVE / OVERNIGHT EVENTS:  No acute events over night. Unable to obtain accurate ROS due to mental status/dementia but appears comfortable and denies any pain.      MEDICATIONS  (STANDING):  apixaban 2.5 milliGRAM(s) Oral two times a day  dextrose 5%. 1000 milliLiter(s) (100 mL/Hr) IV Continuous <Continuous>  ertapenem  IVPB      ertapenem  IVPB 500 milliGRAM(s) IV Intermittent every 24 hours  pantoprazole    Tablet 40 milliGRAM(s) Oral before breakfast  polyethylene glycol 3350 17 Gram(s) Oral daily  senna 2 Tablet(s) Oral at bedtime    MEDICATIONS  (PRN):          OBJECTIVE:  Vital Signs Last 24 Hrs  T(C): 36.5 (11 Jun 2023 05:10), Max: 37.1 (10 Leon 2023 20:42)  T(F): 97.7 (11 Jun 2023 05:10), Max: 98.7 (10 Leon 2023 20:42)  HR: 81 (11 Jun 2023 05:10) (74 - 105)  BP: 121/74 (11 Jun 2023 05:10) (101/52 - 121/74)  BP(mean): 75 (10 Leon 2023 20:42) (75 - 75)  RR: 18 (11 Jun 2023 05:10) (18 - 18)  SpO2: 98% (11 Jun 2023 05:10) (97% - 98%)    Parameters below as of 11 Jun 2023 05:10  Patient On (Oxygen Delivery Method): room air          PHYSICAL EXAM:  GENERAL: NAD  HEAD:  Atraumatic, Normocephalic  EYES: conjunctiva and sclera clear  NECK: Supple, No JVD  CHEST/LUNG: Clear to auscultation bilaterally; No wheeze  HEART: Regular rate and rhythm; No murmurs, rubs, or gallops  ABDOMEN: Soft, Nontender, Nondistended; Bowel sounds present  EXTREMITIES:  No clubbing, cyanosis, or edema  PSYCH: AAOx1 but more alert compared to yesterday  NEUROLOGY: non-focal  SKIN: No rashes or lesions    CAPILLARY BLOOD GLUCOSE        I&O's Summary  I&O's Detail    10 Leon 2023 07:01  -  11 Jun 2023 07:00  --------------------------------------------------------  IN:  Total IN: 0 mL    OUT:    Indwelling Catheter - Urethral (mL): 2500 mL  Total OUT: 2500 mL    Total NET: -2500 mL        LABS:                                 9.3    12.91 )-----------( 320      ( 11 Jun 2023 05:58 )             29.0   06-11    145  |  119<H>  |  68<H>  ----------------------------<  91  4.0   |  19<L>  |  2.60<H>    Ca    8.2<L>      11 Jun 2023 05:58          Culture - Blood (collected 08 Jun 2023 06:39)  Source: .Blood Blood  Preliminary Report (09 Jun 2023 15:02):    No growth to date.    Culture - Blood (collected 08 Jun 2023 06:20)  Source: .Blood Blood  Preliminary Report (09 Jun 2023 15:02):    No growth to date.        RADIOLOGY & ADDITIONAL TESTS:

## 2023-06-11 NOTE — PROGRESS NOTE ADULT - ASSESSMENT
Patient is a 89yo Male with h/o dementia, bedbound, BPH w/ chronic valencia, afib (on eliquis), HTN that presents to the ED due to decreased urinary output from valencia catheter. Pt a/w TERENCE. Nephrology consulted for Elevated serum creatinine.  CT abd/pelvis with nodular hepatic parenchymal contour suggestive of underlying hepatocellular dz/cirrhosis. Moderate b/l hydro with hydroureter identified to the level of the urinary bladder. Compleletly decompressed bladder by valencia with bladder wall thickening    1. TERENCE- unknown baseline SCr. Previous SCr 1.66 on 5/29/23 & SCr 1.71 on 5/22/23. TERENCE in the setting of obstructive uropathy with ?cirrhosis with sepsis 2/2 UTI/ relative hypotension. Urology following.    UA active in the setting of infection. FeNa 3.6%; ?obstructive. Renal function slowly improving with good urine o/p. c/w D5 at 100ml/hr  due to hyperchloremic hypernatremia; now improving.    Repeat Renal US with persistent moderate Rt hydro  and mild left hydro (slight improvement); f/u Urology  Strict I/Os. Avoid nephrotoxins/ NSAIDs/ RCA. Monitor BMP.  2. b/l hydronephrosis- Moderate b/l hydro with hydroureter with compressed bladder by valencia. Urology following. c/w good bowel regimen  3. Sepsis 2/2 UTI- (leukocytosis with tachycardia) +UA. Pt on Invanz. UCx Ecoli. BCx +ESBL Ecoli. BCx 6/8- NG  4. Hypotension- resolving. BP acceptable. s/p albumin gtt 25% in 100ml q 8hrs x 48 hrs.  Monitor BP    [facility] NEPHROLOGY  Avelino Fair M.D.  ABDIRASHID PorterO.  Carolann Regalado M.D.  Janine Ackerman, MSN, ANP-C  [phone]  Fax: [phone] [place] [address], [address]  [place] [address]

## 2023-06-11 NOTE — PROGRESS NOTE ADULT - ASSESSMENT
89 year old man with dementia, BPH with chronic indwelling valencia catheter, A-fib on Eliquis, HTN here with blocked catheter and urinary retention. Admit for Sepsis 2/2 ESBL bacteremia, Chronic CAUTI, Acute encephalopathy 2/2 infection and uremia, TERENCE on CKD 3    # Sepsis 2/2 ESBL bacteremia  # Complicated UTI/ CAUTI  # Acute encephalopathy  # Hematuria - traumatic Valencia vs blood thinner induced?  # TERENCE on CKD 3  # Obstructive uropathy and pre-renal TERENCE  # NAGMA  # Hyponatremia  -Valencia cathter changed in ED  - Continue ertapenem     - ID consulted and discussed plan to continue ertapenem  - Repeat Bcx drawn NGTD  - CBC/BMP reviewed- TERENCE improving with IVF - continue and monitor  - Repeat Renal US showing continued hydronephrosis  - f/u Urology recs  -  c/w miralax/senna for BM  - Monitor mental status  - DVT ppx

## 2023-06-11 NOTE — PROGRESS NOTE ADULT - SUBJECTIVE AND OBJECTIVE BOX
[facility] NEPHROLOGY- PROGRESS NOTE    Patient is a 88yo Male with h/o dementia, bedbound, BPH w/ chronic valencia, afib (on eliquis), HTN that presents to the ED due to decreased urinary output from valencia catheter. Pt a/w TERENCE. Nephrology consulted for Elevated serum creatinine.  CT abd/pelvis with nodular hepatic parenchymal contour suggestive of underlying hepatocellular dz/cirrhosis. Moderate b/l hydro with hydroureter identified to the level of the urinary bladder. Compleletly decompressed bladder by valencia with bladder wall thickening    Hospital Medications: Medications reviewed.  REVIEW OF SYSTEMS: Unreliable due to dementia: not answering questions today    VITALS:  T(F): 97.7 (23 @ 05:10), Max: 98.7 (06-10-23 @ 20:42)  HR: 81 (23 @ 05:10)  BP: 121/74 (23 @ 05:10)  RR: 18 (23 @ 05:10)  SpO2: 98% (23 @ 05:10)  Wt(kg): --    06-10 @ 07:01  -   @ 07:00  --------------------------------------------------------  IN: 0 mL / OUT: 2500 mL / NET: -2500 mL        PHYSICAL EXAM:  Gen: NAD,   HEENT: anicteric  Neck: no JVD  Cards: RRR, +S1/S2, no M/G/R  Resp: CTA B/L  GI: soft, NT/ND, NABS  : +valencia  Extremities: no LE edema B/L;     LABS:      145  |  119<H>  |  68<H>  ----------------------------<  91  4.0   |  19<L>  |  2.60<H>    Ca    8.2<L>      2023 05:58      Creatinine Trend: 2.60 <--, 2.99 <--, 3.28 <--, 4.16 <--, 5.06 <--, 5.75 <--, 6.11 <--, 5.89 <--                        9.3    12.91 )-----------( 320      ( 2023 05:58 )             29.0     Urine Studies:  Urinalysis Basic - ( 2023 22:10 )    Color: Yellow / Appearance: Slightly Turbid / S.010 / pH:   Gluc:  / Ketone: Negative  / Bili: Negative / Urobili: Negative   Blood:  / Protein: 100 mg/dL / Nitrite: Negative   Leuk Esterase: Moderate / RBC: 10-25 /HPF / WBC 11-25 /HPF   Sq Epi:  / Non Sq Epi:  / Bacteria: Moderate /HPF      Creatinine, Random Urine: 42 mg/dL ( @ 17:56)  Sodium, Random Urine: 35 mmol/L ( @ 17:56)

## 2023-06-12 LAB
ANION GAP SERPL CALC-SCNC: 7 MMOL/L — SIGNIFICANT CHANGE UP (ref 5–17)
BASOPHILS # BLD AUTO: 0.06 K/UL — SIGNIFICANT CHANGE UP (ref 0–0.2)
BASOPHILS NFR BLD AUTO: 0.3 % — SIGNIFICANT CHANGE UP (ref 0–2)
BUN SERPL-MCNC: 59 MG/DL — HIGH (ref 7–18)
CALCIUM SERPL-MCNC: 8.3 MG/DL — LOW (ref 8.4–10.5)
CHLORIDE SERPL-SCNC: 115 MMOL/L — HIGH (ref 96–108)
CO2 SERPL-SCNC: 19 MMOL/L — LOW (ref 22–31)
CREAT SERPL-MCNC: 2.45 MG/DL — HIGH (ref 0.5–1.3)
EGFR: 25 ML/MIN/1.73M2 — LOW
EOSINOPHIL # BLD AUTO: 0.41 K/UL — SIGNIFICANT CHANGE UP (ref 0–0.5)
EOSINOPHIL NFR BLD AUTO: 2.2 % — SIGNIFICANT CHANGE UP (ref 0–6)
GLUCOSE SERPL-MCNC: 112 MG/DL — HIGH (ref 70–99)
GRAM STN FLD: SIGNIFICANT CHANGE UP
GRAM STN FLD: SIGNIFICANT CHANGE UP
HCT VFR BLD CALC: 29.5 % — LOW (ref 39–50)
HGB BLD-MCNC: 9.2 G/DL — LOW (ref 13–17)
IMM GRANULOCYTES NFR BLD AUTO: 2.8 % — HIGH (ref 0–0.9)
LYMPHOCYTES # BLD AUTO: 10.8 % — LOW (ref 13–44)
LYMPHOCYTES # BLD AUTO: 2.04 K/UL — SIGNIFICANT CHANGE UP (ref 1–3.3)
MCHC RBC-ENTMCNC: 25.1 PG — LOW (ref 27–34)
MCHC RBC-ENTMCNC: 31.2 GM/DL — LOW (ref 32–36)
MCV RBC AUTO: 80.4 FL — SIGNIFICANT CHANGE UP (ref 80–100)
MONOCYTES # BLD AUTO: 1 K/UL — HIGH (ref 0–0.9)
MONOCYTES NFR BLD AUTO: 5.3 % — SIGNIFICANT CHANGE UP (ref 2–14)
NEUTROPHILS # BLD AUTO: 14.78 K/UL — HIGH (ref 1.8–7.4)
NEUTROPHILS NFR BLD AUTO: 78.6 % — HIGH (ref 43–77)
NRBC # BLD: 0 /100 WBCS — SIGNIFICANT CHANGE UP (ref 0–0)
PLATELET # BLD AUTO: 388 K/UL — SIGNIFICANT CHANGE UP (ref 150–400)
POTASSIUM SERPL-MCNC: 4.1 MMOL/L — SIGNIFICANT CHANGE UP (ref 3.5–5.3)
POTASSIUM SERPL-SCNC: 4.1 MMOL/L — SIGNIFICANT CHANGE UP (ref 3.5–5.3)
RBC # BLD: 3.67 M/UL — LOW (ref 4.2–5.8)
RBC # FLD: 17.6 % — HIGH (ref 10.3–14.5)
SODIUM SERPL-SCNC: 141 MMOL/L — SIGNIFICANT CHANGE UP (ref 135–145)
WBC # BLD: 18.82 K/UL — HIGH (ref 3.8–10.5)
WBC # FLD AUTO: 18.82 K/UL — HIGH (ref 3.8–10.5)

## 2023-06-12 PROCEDURE — 71045 X-RAY EXAM CHEST 1 VIEW: CPT | Mod: 26

## 2023-06-12 PROCEDURE — 99232 SBSQ HOSP IP/OBS MODERATE 35: CPT

## 2023-06-12 PROCEDURE — 99233 SBSQ HOSP IP/OBS HIGH 50: CPT | Mod: FS

## 2023-06-12 RX ADMIN — POLYETHYLENE GLYCOL 3350 17 GRAM(S): 17 POWDER, FOR SOLUTION ORAL at 11:51

## 2023-06-12 RX ADMIN — SODIUM CHLORIDE 100 MILLILITER(S): 9 INJECTION, SOLUTION INTRAVENOUS at 06:15

## 2023-06-12 RX ADMIN — APIXABAN 2.5 MILLIGRAM(S): 2.5 TABLET, FILM COATED ORAL at 18:38

## 2023-06-12 RX ADMIN — SENNA PLUS 2 TABLET(S): 8.6 TABLET ORAL at 21:06

## 2023-06-12 RX ADMIN — APIXABAN 2.5 MILLIGRAM(S): 2.5 TABLET, FILM COATED ORAL at 05:14

## 2023-06-12 RX ADMIN — PANTOPRAZOLE SODIUM 40 MILLIGRAM(S): 20 TABLET, DELAYED RELEASE ORAL at 05:14

## 2023-06-12 RX ADMIN — ERTAPENEM SODIUM 100 MILLIGRAM(S): 1 INJECTION, POWDER, LYOPHILIZED, FOR SOLUTION INTRAMUSCULAR; INTRAVENOUS at 18:38

## 2023-06-12 NOTE — PROGRESS NOTE ADULT - PROBLEM SELECTOR PLAN 7
- Pt recently discharged from [facility], discharged home on Thursday 6/1, lives home with wife  - Temp, leukocytosis, and labile BP noted-Repeat Bld cx's pending-f/u results.  CXR pending-f/u results.

## 2023-06-12 NOTE — PROGRESS NOTE ADULT - ASSESSMENT
Subjective: Pt less responsive and interactive, afebrile, NAD, no diarrhea.    REVIEW OF SYSTEMS: Unable to obtain due to dementia    PE:  Vital Signs Last 24 Hrs  T(C): 36.9 (12 Jun 2023 04:54), Max: 37.8 (11 Jun 2023 20:42)  T(F): 98.5 (12 Jun 2023 04:54), Max: 100 (11 Jun 2023 20:42)  HR: 87 (12 Jun 2023 04:54) (87 - 104)  BP: 97/61 (12 Jun 2023 04:54) (97/61 - 122/767)  BP(mean): 73 (12 Jun 2023 04:54) (73 - 80)  RR: 18 (12 Jun 2023 04:54) (18 - 18)  SpO2: 95% (12 Jun 2023 04:54) (95% - 100%)    Parameters below as of 12 Jun 2023 04:54 Patient On (Oxygen Delivery Method): room air    Gen: AOx0, NAD  HEAD:  Atraumatic, Normocephalic  EYES: PERRLA, conjunctiva and sclera clear  ENT: Moist mucous membranes  NECK: Supple, No JVD  CV: S1+S2 normal, no murmurs   Resp: CTA, no rales, no wheezing  Abd: Soft, nontender, +BS  Ext: No LE edema, no cyanosis, pulses +  :+ Valencia with yellow urine  IV/Skin: No thrombophlebitis  Msk: no joint swelling  Neuro: AAOx0, no focal signs    LABS/DIAGNOSTIC TESTS:                        9.2    18.82 )-----------( 388      ( 12 Jun 2023 05:29 )             29.5     WBC Count: 18.82 K/uL (06-12 @ 05:29)  WBC Count: 12.91 K/uL (06-11 @ 05:58)  WBC Count: 11.80 K/uL (06-10 @ 05:10)    06-12    141  |  115<H>  |  59<H>  ----------------------------<  112<H>  4.1   |  19<L>  |  2.45<H>    Ca    8.3<L>      12 Jun 2023 05:29    CULTURES:   Culture - Blood (collected 06-08-23 @ 06:39)  Source: .Blood Blood  Preliminary Report (06-09-23 @ 15:02):    No growth to date.    Culture - Blood (collected 06-08-23 @ 06:20)  Source: .Blood Blood  Preliminary Report (06-09-23 @ 15:02):    No growth to date.    Culture - Blood (collected 06-07-23 @ 06:10)  Source: .Blood Blood-Peripheral  Gram Stain (06-12-23 @ 08:30):    Growth in anaerobic bottle: Gram Negative Rods  Preliminary Report (06-12-23 @ 08:30):    Growth in anaerobic bottle: Gram Negative Rods    Culture - Blood (collected 06-07-23 @ 06:05)  Source: .Blood Blood-Venous  Gram Stain (06-12-23 @ 07:39):    Growth in anaerobic bottle: Gram Negative Rods  Preliminary Report (06-12-23 @ 07:39):    Growth in anaerobic bottle: Gram Negative Rods    Culture - Blood (collected 06-05-23 @ 23:18)  Source: .Blood Blood  Gram Stain (06-06-23 @ 17:00):    Growth in aerobic bottle: Gram Negative Rods    Growth in anaerobic bottle: Gram Negative Rods  Final Report (06-08-23 @ 10:09):    Growth in aerobic and anaerobic bottles: Escherichia coli ESBL  Organism: Blood Culture PCR  Escherichia coli ESBL (06-08-23 @ 10:09)  Organism: Escherichia coli ESBL (06-08-23 @ 10:09)      Method Type: KRISTI      -  Amikacin: S <=16      -  Ampicillin: R >16 These ampicillin results predict results for amoxicillin      -  Ampicillin/Sulbactam: R >16/8 Enterobacter, Klebsiella aerogenes, Citrobacter, and Serratia may develop resistance during prolonged therapy (3-4 days)      -  Aztreonam: R >16      -  Cefazolin: R >16 Enterobacter, Klebsiella aerogenes, Citrobacter, and Serratia may develop resistance during prolonged therapy (3-4 days)      -  Cefepime: R >16      -  Ceftriaxone: R >32 Enterobacter, Klebsiella aerogenes, Citrobacter, and Serratia may develop resistance during prolonged therapy      -  Ciprofloxacin: R >2      -  Ertapenem: S <=0.5      -  Gentamicin: R >8      -  Imipenem: S <=1      -  Levofloxacin: R >4      -  Meropenem: S <=1      -  Piperacillin/Tazobactam: R 64      -  Tobramycin: R >8      -  Trimethoprim/Sulfamethoxazole: S <=0.5/9.5  Organism: Blood Culture PCR (06-08-23 @ 10:09)      Method Type: PCR      -  Escherichia coli: Detec      -  ESBL: Detec      -  CTX-M Resistance Marker: Detec    Culture - Blood (collected 06-05-23 @ 23:18)  Source: .Blood Blood  Gram Stain (06-06-23 @ 21:44):    Growth in anaerobic bottle: Gram Negative Rods    Growth in aerobic bottle: Gram Negative Rods  Final Report (06-08-23 @ 10:10):    Growth in aerobic and anaerobic bottles: Escherichia coli ESBL    See previous culture [id number]    Culture - Urine (collected 06-05-23 @ 22:10)  Source: Clean Catch Clean Catch (Midstream)  Final Report (06-08-23 @ 17:29):    >100,000 CFU/ml Escherichia coli ESBL    >100,000 CFU/ml Escherichia coli ESBL #2    Multiple Morphological Strains  Organism: Escherichia coli ESBL  Escherichia coli ESBL (06-08-23 @ 17:29)  Organism: Escherichia coli ESBL (06-08-23 @ 17:29)      Method Type: KRISTI      -  Amikacin: S <=16      -  Amoxicillin/Clavulanic Acid: S <=8/4      -  Ampicillin: R >16 These ampicillin results predict results for amoxicillin      -  Ampicillin/Sulbactam: S 8/4 Enterobacter, Klebsiella aerogenes, Citrobacter, and Serratia may develop resistance during prolonged therapy (3-4 days)      -  Aztreonam: R >16      -  Cefazolin: R >16 For uncomplicated UTI with K. pneumoniae, E. coli, or P. mirablis: KRISTI <=16 is sensitive and KRISTI >=32 is resistant. This also predicts results for oral agents cefaclor, cefdinir, cefpodoxime, cefprozil, cefuroxime axetil, cephalexin and locarbef for uncomplicated UTI. Note that some isolates may be susceptible to these agents while testing resistant to cefazolin.      -  Cefepime: R >16      -  Ceftriaxone: R >32 Enterobacter, Klebsiella aerogenes, Citrobacter, and Serratia may develop resistance during prolonged therapy      -  Cefuroxime: R >16      -  Ciprofloxacin: R >2      -  Ertapenem: S <=0.5      -  Gentamicin: S <=2      -  Imipenem: S <=1      -  Levofloxacin: R >4      -  Meropenem: S <=1      -  Nitrofurantoin: S <=32 Should not be used to treat pyelonephritis      -  Piperacillin/Tazobactam: S <=8      -  Tobramycin: S <=2      -  Trimethoprim/Sulfamethoxazole: R >2/38  Organism: Escherichia coli ESBL (06-08-23 @ 17:29)      Method Type: KRISTI      -  Amikacin: S <=16      -  Amoxicillin/Clavulanic Acid: S <=8/4      -  Ampicillin: R >16 These ampicillin results predict results for amoxicillin      -  Ampicillin/Sulbactam: S 8/4 Enterobacter, Klebsiella aerogenes, Citrobacter, and Serratia may develop resistance during prolonged therapy (3-4 days)      -  Aztreonam: R >16      -  Cefazolin: R >16 For uncomplicated UTI with K. pneumoniae, E. coli, or P. mirablis: KRISTI <=16 is sensitive and KRISTI >=32 is resistant. This also predicts results for oral agents cefaclor, cefdinir, cefpodoxime, cefprozil, cefuroxime axetil, cephalexin and locarbef for uncomplicated UTI. Note that some isolates may be susceptible to these agents while testing resistant to cefazolin.      -  Cefepime: R >16      -  Ceftriaxone: R >32 Enterobacter, Klebsiella aerogenes, Citrobacter, and Serratia may develop resistance during prolonged therapy      -  Cefuroxime: R >16      -  Ciprofloxacin: R >2      -  Ertapenem: S <=0.5      -  Gentamicin: S <=2      -  Imipenem: S <=1      -  Levofloxacin: R >4      -  Meropenem: S <=1      -  Nitrofurantoin: S <=32 Should not be used to treat pyelonephritis      -  Piperacillin/Tazobactam: S <=8      -  Tobramycin: S <=2      -  Trimethoprim/Sulfamethoxazole: S <=0.5/9.5    C-Reactive Protein, Serum: 151 mg/L *H* (06-08-23 @ 06:39)  Sedimentation Rate, Erythrocyte: 100 mm/Hr *H* (06-08-23 @ 06:39)  Sedimentation Rate, Erythrocyte: 92 mm/Hr *H* (06-09-23 @ 05:10)  C-Reactive Protein, Serum: 115 mg/L *H* (06-09-23 @ 05:10)  Sedimentation Rate, Erythrocyte: 97 mm/Hr *H* (06-10-23 @ 05:10)  C-Reactive Protein, Serum: 92 mg/L *H* (06-10-23 @ 05:10)    ANTIBIOTICS:  ertapenem  IVPB    ertapenem  IVPB 500 every 24 hours    IMPRESSION:  89 yo male from home, bedridden, has HHA, has h/o dementia, CKD, /chronic indwelling Valencia, Afib on Eliquis, HTN who presented to ED for urinary retention/blocked Valencia cath with decreased urinary output and hematuria, noted worsening renal function(TERENCE on CKD)    -Sepsis due to BSI/ UTI 2/2 ESBL E coli.  -ESBL E coli bacteremia 2/2 UTI (BC 6/5 and 6/7 +; BC 6/8 NGTD).  -CA-UTI due to ESBL E coli.  -Obstructive Uropathy- pt has chronic indwelling valencia and multiple bladder diverticula seen in previous imaging, prostate normal in CT, cystoscopy recommended in the past, bladder CA in the differential.   -Obstructive Uropathy BL hydronephrosis known since 2022 as per records, w/chronic indwelling Valencia cath, now with Moderate hydro with hydroureter identified to the level of the urinarybladder.  -TERENCE on CKD likely 2/2 obstructive uropathy.  -Acute encephalopathy sepsis / Dementia.     PLAN:   Renal US reviewed, still moderate hydronephrosis +, urology recommending to keep valencia + no further urologic intervention recommended. Pt had low grade temp yesterday, WBC 18 K, pt mental status unchanged, less interactive today, primary team concerned for aspiration even over the weekend.  Continue Ertapenem 500 mg q24 hrs for total 14 days after negative blood cultures (6/8- 6/22) for the complicated UTI  Repeat another set of blood cultures  Follow up blood cx until finalized  Trend ESR and CRP  Monitor renal function  Trend WBC  Aspiration precautions  Valencia care(dandre care), change cath q14-21 days  Discussed with medicine attending    Please reach ID with any questions or concerns  Dr. Darlene Blank  Available in Teams

## 2023-06-12 NOTE — PROGRESS NOTE ADULT - SUBJECTIVE AND OBJECTIVE BOX
[facility] NEPHROLOGY- PROGRESS NOTE    Patient is a 88yo Male with h/o dementia, bedbound, BPH w/ chronic valencia, afib (on eliquis), HTN that presents to the ED due to decreased urinary output from valencia catheter. Pt a/w TERENCE. Nephrology consulted for Elevated serum creatinine.  CT abd/pelvis with nodular hepatic parenchymal contour suggestive of underlying hepatocellular dz/cirrhosis. Moderate b/l hydro with hydroureter identified to the level of the urinary bladder. Compleletly decompressed bladder by valencia with bladder wall thickening    Hospital Medications: Medications reviewed.  REVIEW OF SYSTEMS: Unreliable due to dementia: not answering questions today    VITALS:  T(F): 100.1 (23 @ 13:51), Max: 100.1 (23 @ 13:51)  HR: 85 (23 @ 13:50)  BP: 103/65 (23 @ 13:50)  RR: 18 (23 @ 13:50)  SpO2: 96% (23 @ 13:50)  Wt(kg): --     @ 07:01  -   @ 07:00  --------------------------------------------------------  IN: 0 mL / OUT: 2550 mL / NET: -2550 mL     @ 07:01  -   @ 18:24  --------------------------------------------------------  IN: 0 mL / OUT: 650 mL / NET: -650 mL      PHYSICAL EXAM:  Gen: NAD,   HEENT: anicteric  Neck: no JVD  Cards: RRR, +S1/S2, no M/G/R  Resp: CTA B/L  GI: soft, NT/ND, NABS  : +valencia  Extremities: no LE edema B/L;     LABS:      141  |  115<H>  |  59<H>  ----------------------------<  112<H>  4.1   |  19<L>  |  2.45<H>    Ca    8.3<L>      2023 05:29      Creatinine Trend: 2.45 <--, 2.60 <--, 2.99 <--, 3.28 <--, 4.16 <--, 5.06 <--, 5.75 <--, 6.11 <--, 5.89 <--                        9.2    18.82 )-----------( 388      ( 2023 05:29 )             29.5     Urine Studies:  Urinalysis Basic - ( 2023 22:10 )    Color: Yellow / Appearance: Slightly Turbid / S.010 / pH:   Gluc:  / Ketone: Negative  / Bili: Negative / Urobili: Negative   Blood:  / Protein: 100 mg/dL / Nitrite: Negative   Leuk Esterase: Moderate / RBC: 10-25 /HPF / WBC 11-25 /HPF   Sq Epi:  / Non Sq Epi:  / Bacteria: Moderate /HPF      Creatinine, Random Urine: 42 mg/dL ( @ 17:56)  Sodium, Random Urine: 35 mmol/L ( @ 17:56)

## 2023-06-12 NOTE — PROGRESS NOTE ADULT - PROBLEM SELECTOR PLAN 1
- Temp, leukocytosis, and labile BP noted-Repeat Bld cx's pending-f/u results.  CXR pending-f/u results.    - P/w sepsis, valencia exchanged in ED   - 6/7 hypotensive overnight  - Currently on IVF.  Reassess in AM.   - (+) Bld & Ucx-ESBL Ecoli   - Currently on Ertapenem   - Repeat Bld cx's from 6/7, one bottle (+) GNR   - Repeat Bld cx's pending from 6/8 NGTD    - ID-Dr. Chase consulted, appreciated

## 2023-06-12 NOTE — PROGRESS NOTE ADULT - ASSESSMENT
Patient is a 89yo Male with h/o dementia, bedbound, BPH w/ chronic valencia, afib (on eliquis), HTN that presents to the ED due to decreased urinary output from valencia catheter. Pt a/w TERENCE. Nephrology consulted for Elevated serum creatinine.  CT abd/pelvis with nodular hepatic parenchymal contour suggestive of underlying hepatocellular dz/cirrhosis. Moderate b/l hydro with hydroureter identified to the level of the urinary bladder. Compleletly decompressed bladder by valencia with bladder wall thickening    1. TERENCE- unknown baseline SCr. Previous SCr 1.66 on 5/29/23 & SCr 1.71 on 5/22/23. TERENCE in the setting of obstructive uropathy with ?cirrhosis with sepsis 2/2 UTI/ relative hypotension. Urology following.    UA active in the setting of infection. FeNa 3.6%; ?obstructive. Renal function slowly improving with good urine o/p. c/w D5 at 100ml/hr  due to hyperchloremic hypernatremia; now improving.    Repeat Renal US with persistent moderate Rt hydro  and mild left hydro (slight improvement); f/u Urology  Strict I/Os. Avoid nephrotoxins/ NSAIDs/ RCA. Monitor BMP.  2. b/l hydronephrosis- Moderate b/l hydro with hydroureter with compressed bladder by valencia. Urology following. c/w good bowel regimen  3. Sepsis 2/2 UTI- (leukocytosis with tachycardia) +UA. Pt on Invanz. UCx Ecoli. BCx +ESBL Ecoli. BCx 6/8- NG  4. Hypotension- BP low normal. s/p albumin gtt 25% in 100ml q 8hrs x 48 hrs.  Monitor BP    [facility]  Avelino Fair M.D.  ABDIRASHID PorterO.  Carolann Regalado M.D.  Janine Ackerman, MSN, ANP-C  [phone]  Fax: [phone] [place] [address], #-  [place] [address]

## 2023-06-12 NOTE — PROGRESS NOTE ADULT - SUBJECTIVE AND OBJECTIVE BOX
NP Note discussed with  primary attending    Patient is a 89y old  Male who presents with a chief complaint of Urinary retention (12 Jun 2023 10:45)      INTERVAL HPI/OVERNIGHT EVENTS: Limited exam in demented pt.      MEDICATIONS  (STANDING):  apixaban 2.5 milliGRAM(s) Oral two times a day  dextrose 5%. 1000 milliLiter(s) (100 mL/Hr) IV Continuous <Continuous>  ertapenem  IVPB 500 milliGRAM(s) IV Intermittent every 24 hours  ertapenem  IVPB      pantoprazole    Tablet 40 milliGRAM(s) Oral before breakfast  polyethylene glycol 3350 17 Gram(s) Oral daily  senna 2 Tablet(s) Oral at bedtime    MEDICATIONS  (PRN):      __________________________________________________  REVIEW OF SYSTEMS:  Limited exam in demented pt     CONSTITUTIONAL: No fever  EYES: No acute visual disturbances  NECK: No pain or stiffness  RESPIRATORY: No cough; No shortness of breath  CARDIOVASCULAR: No chest pain, no palpitations  GASTROINTESTINAL: No pain. No nausea or vomiting.  No diarrhea   NEUROLOGICAL: No headache or numbness, no tremors  MUSCULOSKELETAL: No joint pain, no muscle pain  GENITOURINARY: No dysuria, no frequency, no hesitancy  PSYCHIATRY: No depression , no anxiety  ALL OTHER  ROS negative      Vital Signs Last 24 Hrs  T(C): 36.9 (12 Jun 2023 04:54), Max: 37.8 (11 Jun 2023 20:42)  T(F): 98.5 (12 Jun 2023 04:54), Max: 100 (11 Jun 2023 20:42)  HR: 87 (12 Jun 2023 04:54) (87 - 104)  BP: 97/61 (12 Jun 2023 04:54) (97/61 - 118/67)  BP(mean): 73 (12 Jun 2023 04:54) (73 - 80)  RR: 18 (12 Jun 2023 04:54) (18 - 18)  SpO2: 95% (12 Jun 2023 04:54) (95% - 100%)    Parameters below as of 12 Jun 2023 04:54  Patient On (Oxygen Delivery Method): room air        ________________________________________________  PHYSICAL EXAM:  Limited exam in demented pt   GENERAL: NAD  HEENT: Normocephalic;  conjunctivae and sclerae clear; moist mucous membranes   NECK : Supple  CHEST/LUNG: Clear to auscultation bilaterally with good air entry   HEART: S1 S2  regular; no murmurs, gallops or rubs  ABDOMEN: Soft, Nontender, Nondistended; Bowel sounds present x 4 quad.  (+) Vogel.    EXTREMITIES: No cyanosis; no edema; no calf tenderness  SKIN: Warm and dry; (+) DTI Coccyx   NERVOUS SYSTEM:  Awake: Oriented to person, not place and time; no new deficits  _________________________________________________  LABS:                        9.2    18.82 )-----------( 388      ( 12 Jun 2023 05:29 )             29.5     06-12    141  |  115<H>  |  59<H>  ----------------------------<  112<H>  4.1   |  19<L>  |  2.45<H>    Ca    8.3<L>      12 Jun 2023 05:29          CAPILLARY BLOOD GLUCOSE            RADIOLOGY & ADDITIONAL TESTS:    Imaging Personally Reviewed:  YES    Consultant(s) Notes Reviewed:   YES    Care Discussed with Consultants :     Plan of care was discussed with patient and /or primary care giver; all questions and concerns were addressed and care was aligned with patient's wishes.

## 2023-06-13 DIAGNOSIS — J69.0 PNEUMONITIS DUE TO INHALATION OF FOOD AND VOMIT: ICD-10-CM

## 2023-06-13 LAB
ALBUMIN SERPL ELPH-MCNC: 2.1 G/DL — LOW (ref 3.5–5)
ALP SERPL-CCNC: 179 U/L — HIGH (ref 40–120)
ALT FLD-CCNC: 23 U/L DA — SIGNIFICANT CHANGE UP (ref 10–60)
ANION GAP SERPL CALC-SCNC: 6 MMOL/L — SIGNIFICANT CHANGE UP (ref 5–17)
AST SERPL-CCNC: 25 U/L — SIGNIFICANT CHANGE UP (ref 10–40)
BASOPHILS # BLD AUTO: 0.06 K/UL — SIGNIFICANT CHANGE UP (ref 0–0.2)
BASOPHILS NFR BLD AUTO: 0.3 % — SIGNIFICANT CHANGE UP (ref 0–2)
BILIRUB SERPL-MCNC: 0.7 MG/DL — SIGNIFICANT CHANGE UP (ref 0.2–1.2)
BUN SERPL-MCNC: 56 MG/DL — HIGH (ref 7–18)
CALCIUM SERPL-MCNC: 8.4 MG/DL — SIGNIFICANT CHANGE UP (ref 8.4–10.5)
CHLORIDE SERPL-SCNC: 113 MMOL/L — HIGH (ref 96–108)
CO2 SERPL-SCNC: 20 MMOL/L — LOW (ref 22–31)
CREAT SERPL-MCNC: 2.49 MG/DL — HIGH (ref 0.5–1.3)
CRP SERPL-MCNC: 109 MG/L — HIGH
CULTURE RESULTS: SIGNIFICANT CHANGE UP
CULTURE RESULTS: SIGNIFICANT CHANGE UP
EGFR: 24 ML/MIN/1.73M2 — LOW
EOSINOPHIL # BLD AUTO: 0.37 K/UL — SIGNIFICANT CHANGE UP (ref 0–0.5)
EOSINOPHIL NFR BLD AUTO: 1.7 % — SIGNIFICANT CHANGE UP (ref 0–6)
ERYTHROCYTE [SEDIMENTATION RATE] IN BLOOD: 104 MM/HR — HIGH (ref 0–20)
GLUCOSE SERPL-MCNC: 106 MG/DL — HIGH (ref 70–99)
HCT VFR BLD CALC: 29 % — LOW (ref 39–50)
HGB BLD-MCNC: 9.2 G/DL — LOW (ref 13–17)
IMM GRANULOCYTES NFR BLD AUTO: 2.6 % — HIGH (ref 0–0.9)
LYMPHOCYTES # BLD AUTO: 1.91 K/UL — SIGNIFICANT CHANGE UP (ref 1–3.3)
LYMPHOCYTES # BLD AUTO: 8.8 % — LOW (ref 13–44)
MAGNESIUM SERPL-MCNC: 1.5 MG/DL — LOW (ref 1.6–2.6)
MCHC RBC-ENTMCNC: 25.6 PG — LOW (ref 27–34)
MCHC RBC-ENTMCNC: 31.7 GM/DL — LOW (ref 32–36)
MCV RBC AUTO: 80.8 FL — SIGNIFICANT CHANGE UP (ref 80–100)
MONOCYTES # BLD AUTO: 0.91 K/UL — HIGH (ref 0–0.9)
MONOCYTES NFR BLD AUTO: 4.2 % — SIGNIFICANT CHANGE UP (ref 2–14)
NEUTROPHILS # BLD AUTO: 17.82 K/UL — HIGH (ref 1.8–7.4)
NEUTROPHILS NFR BLD AUTO: 82.4 % — HIGH (ref 43–77)
NRBC # BLD: 0 /100 WBCS — SIGNIFICANT CHANGE UP (ref 0–0)
PHOSPHATE SERPL-MCNC: 3.7 MG/DL — SIGNIFICANT CHANGE UP (ref 2.5–4.5)
PLATELET # BLD AUTO: 419 K/UL — HIGH (ref 150–400)
POTASSIUM SERPL-MCNC: 4 MMOL/L — SIGNIFICANT CHANGE UP (ref 3.5–5.3)
POTASSIUM SERPL-SCNC: 4 MMOL/L — SIGNIFICANT CHANGE UP (ref 3.5–5.3)
PROT SERPL-MCNC: 7.3 G/DL — SIGNIFICANT CHANGE UP (ref 6–8.3)
RBC # BLD: 3.59 M/UL — LOW (ref 4.2–5.8)
RBC # FLD: 17.6 % — HIGH (ref 10.3–14.5)
SODIUM SERPL-SCNC: 139 MMOL/L — SIGNIFICANT CHANGE UP (ref 135–145)
SPECIMEN SOURCE: SIGNIFICANT CHANGE UP
SPECIMEN SOURCE: SIGNIFICANT CHANGE UP
WBC # BLD: 21.64 K/UL — HIGH (ref 3.8–10.5)
WBC # FLD AUTO: 21.64 K/UL — HIGH (ref 3.8–10.5)

## 2023-06-13 PROCEDURE — 99232 SBSQ HOSP IP/OBS MODERATE 35: CPT

## 2023-06-13 RX ORDER — SODIUM CHLORIDE 9 MG/ML
1000 INJECTION, SOLUTION INTRAVENOUS
Refills: 0 | Status: DISCONTINUED | OUTPATIENT
Start: 2023-06-13 | End: 2023-06-13

## 2023-06-13 RX ORDER — ACETAMINOPHEN 500 MG
650 TABLET ORAL EVERY 6 HOURS
Refills: 0 | Status: DISCONTINUED | OUTPATIENT
Start: 2023-06-13 | End: 2023-06-16

## 2023-06-13 RX ORDER — MAGNESIUM SULFATE 500 MG/ML
2 VIAL (ML) INJECTION ONCE
Refills: 0 | Status: COMPLETED | OUTPATIENT
Start: 2023-06-13 | End: 2023-06-13

## 2023-06-13 RX ORDER — SODIUM CHLORIDE 9 MG/ML
1000 INJECTION, SOLUTION INTRAVENOUS
Refills: 0 | Status: DISCONTINUED | OUTPATIENT
Start: 2023-06-13 | End: 2023-06-16

## 2023-06-13 RX ADMIN — SODIUM CHLORIDE 75 MILLILITER(S): 9 INJECTION, SOLUTION INTRAVENOUS at 14:17

## 2023-06-13 RX ADMIN — Medication 650 MILLIGRAM(S): at 23:30

## 2023-06-13 RX ADMIN — ERTAPENEM SODIUM 100 MILLIGRAM(S): 1 INJECTION, POWDER, LYOPHILIZED, FOR SOLUTION INTRAMUSCULAR; INTRAVENOUS at 18:39

## 2023-06-13 RX ADMIN — Medication 25 GRAM(S): at 12:10

## 2023-06-13 RX ADMIN — PANTOPRAZOLE SODIUM 40 MILLIGRAM(S): 20 TABLET, DELAYED RELEASE ORAL at 05:08

## 2023-06-13 RX ADMIN — APIXABAN 2.5 MILLIGRAM(S): 2.5 TABLET, FILM COATED ORAL at 05:07

## 2023-06-13 RX ADMIN — Medication 650 MILLIGRAM(S): at 22:06

## 2023-06-13 RX ADMIN — APIXABAN 2.5 MILLIGRAM(S): 2.5 TABLET, FILM COATED ORAL at 18:00

## 2023-06-13 RX ADMIN — SODIUM CHLORIDE 75 MILLILITER(S): 9 INJECTION, SOLUTION INTRAVENOUS at 22:07

## 2023-06-13 NOTE — PROGRESS NOTE ADULT - SUBJECTIVE AND OBJECTIVE BOX
[facility] NEPHROLOGY- PROGRESS NOTE    Patient is a 88yo Male with h/o dementia, bedbound, BPH w/ chronic valencia, afib (on eliquis), HTN that presents to the ED due to decreased urinary output from valencia catheter. Pt a/w TERENCE. Nephrology consulted for Elevated serum creatinine.  CT abd/pelvis with nodular hepatic parenchymal contour suggestive of underlying hepatocellular dz/cirrhosis. Moderate b/l hydro with hydroureter identified to the level of the urinary bladder. Compleletly decompressed bladder by valencia with bladder wall thickening    Hospital Medications: Medications reviewed.  REVIEW OF SYSTEMS: Unreliable due to dementia: not answering questions today    VITALS:  T(F): 99.4 (06-13-23 @ 05:12), Max: 100.2 (06-12-23 @ 20:41)  HR: 90 (06-13-23 @ 05:12)  BP: 110/74 (06-13-23 @ 05:12)  RR: 18 (06-13-23 @ 05:12)  SpO2: 97% (06-13-23 @ 05:12)  Wt(kg): --    06-12 @ 07:01  -  06-13 @ 07:00  --------------------------------------------------------  IN: 0 mL / OUT: 1750 mL / NET: -1750 mL      PHYSICAL EXAM:  Gen: NAD,   HEENT: anicteric  Neck: no JVD  Cards: RRR, +S1/S2, no M/G/R  Resp: CTA B/L  GI: soft, NT/ND, NABS  : +valencia  Extremities: no LE edema B/L;     LABS:  06-13    139  |  113<H>  |  56<H>  ----------------------------<  106<H>  4.0   |  20<L>  |  2.49<H>    Ca    8.4      13 Jun 2023 07:35  Phos  3.7     06-13  Mg     1.5     06-13    TPro  7.3  /  Alb  2.1<L>  /  TBili  0.7  /  DBili      /  AST  25  /  ALT  23  /  AlkPhos  179<H>  06-13    Creatinine Trend: 2.49 <--, 2.45 <--, 2.60 <--, 2.99 <--, 3.28 <--, 4.16 <--, 5.06 <--                        9.2    21.64 )-----------( 419      ( 13 Jun 2023 07:35 )             29.0     Urine Studies:    Creatinine, Random Urine: 42 mg/dL (06-06 @ 17:56)  Sodium, Random Urine: 35 mmol/L (06-06 @ 17:56)

## 2023-06-13 NOTE — ADVANCED PRACTICE NURSE CONSULT - RECOMMEDATIONS
-Clean the Coccyx wound with normal saline and apply skin prep to the surrounding skin  -Apply a Foam dressing Q 72hrs PRN for protection  -Apply Bacitracin ointment to the R. Lat Thigh wound and cover with a non-adherent dry dressing Daily PRN  -Elevate/float the patients heels using heel protectors and reposition the patient Q 2hrs using wedges or pillows
-Clean the Coccyx wound with normal saline and apply skin prep to the surrounding skin  -Apply a Foam dressing Q 72hrs PRN for protection  -Elevate/float the patients heels using heel protectors and reposition the patient Q 2hrs using wedges or pillows

## 2023-06-13 NOTE — PROGRESS NOTE ADULT - PROBLEM SELECTOR PLAN 7
- Pt recently discharged from [facility], discharged home on Thursday 6/1, lives home with wife  - Temp, leukocytosis, and labile BP noted-Repeat Bld cx's pending-f/u results.  CXR pending-f/u results. Renal US pending-f/u results. - Pt recently discharged from [facility], discharged home on Thursday 6/1, lives home with wife  - Temp, leukocytosis, and labile BP noted-Repeat Bld cx's pending-f/u results.  Renal US pending-f/u results.

## 2023-06-13 NOTE — PROGRESS NOTE ADULT - ASSESSMENT
Subjective: Pt less interactive, AAOX0, more lethargic, with food content dripping from his mouth, no resp distress, having low grade temp.    REVIEW OF SYSTEMS: Unable to obtain due to AMS    PE:  Vital Signs Last 24 Hrs  T(C): 37.6 (13 Jun 2023 13:07), Max: 37.9 (12 Jun 2023 20:41)  T(F): 99.6 (13 Jun 2023 13:07), Max: 100.2 (12 Jun 2023 20:41)  HR: 98 (13 Jun 2023 13:07) (87 - 98)  BP: 96/46 (13 Jun 2023 13:07) (96/46 - 110/74)  RR: 17 (13 Jun 2023 13:07) (17 - 18)  SpO2: 92% (13 Jun 2023 13:07) (92% - 97%)    Parameters below as of 13 Jun 2023 13:07 Patient On (Oxygen Delivery Method): room air    Gen: AOx0, lethargic   HEAD:  Atraumatic, Normocephalic  EYES: PERRLA, conjunctiva and sclera clear  ENT: Moist mucous membranes  NECK: Supple, No JVD  CV: S1+S2 normal, no murmurs   Resp: CTA  Abd: Soft, nontender, +BS  Ext: No LE edema, no cyanosis, pulses +  : + valencia with yellow urine  IV/Skin: No thrombophlebitis  Msk: no joint swelling  Neuro: AAOx0, + lethargic, does not follows commands  Psych: no anxiety, no delusional ideas, no suicidal ideation    LABS/DIAGNOSTIC TESTS:                        9.2    21.64 )-----------( 419      ( 13 Jun 2023 07:35 )             29.0     WBC Count: 21.64 K/uL (06-13 @ 07:35)  WBC Count: 18.82 K/uL (06-12 @ 05:29)  WBC Count: 12.91 K/uL (06-11 @ 05:58)    06-13    139  |  113<H>  |  56<H>  ----------------------------<  106<H>  4.0   |  20<L>  |  2.49<H>    Ca    8.4      13 Jun 2023 07:35  Phos  3.7     06-13  Mg     1.5     06-13    TPro  7.3  /  Alb  2.1<L>  /  TBili  0.7  /  DBili  x   /  AST  25  /  ALT  23  /  AlkPhos  179<H>  06-13    CULTURES:   Culture - Blood (collected 06-08-23 @ 06:39)  Source: .Blood Blood  Preliminary Report (06-09-23 @ 15:02):    No growth to date.    Culture - Blood (collected 06-08-23 @ 06:20)  Source: .Blood Blood  Preliminary Report (06-09-23 @ 15:02):    No growth to date.    Culture - Blood (collected 06-07-23 @ 06:10)  Source: .Blood Blood-Peripheral  Gram Stain (06-12-23 @ 08:30):    Growth in anaerobic bottle: Gram Negative Rods  Preliminary Report (06-13-23 @ 12:38):    Growth in anaerobic bottle: Escherichia coli    Culture - Blood (collected 06-07-23 @ 06:05)  Source: .Blood Blood-Venous  Gram Stain (06-12-23 @ 07:39):    Growth in anaerobic bottle: Gram Negative Rods  Preliminary Report (06-13-23 @ 12:40):    Growth in anaerobic bottle: Escherichia coli    Culture - Blood (collected 06-05-23 @ 23:18)  Source: .Blood Blood  Gram Stain (06-06-23 @ 17:00):    Growth in aerobic bottle: Gram Negative Rods    Growth in anaerobic bottle: Gram Negative Rods  Final Report (06-08-23 @ 10:09):  Organism: Blood Culture PCR (06-08-23 @ 10:09)      Method Type: PCR      -  Escherichia coli: Detec      -  ESBL: Detec      -  CTX-M Resistance Marker: Detec    Culture - Blood (collected 06-05-23 @ 23:18)  Source: .Blood Blood  Gram Stain (06-06-23 @ 21:44):    Growth in anaerobic bottle: Gram Negative Rods    Growth in aerobic bottle: Gram Negative Rods  Final Report (06-08-23 @ 10:10):    Growth in aerobic and anaerobic bottles: Escherichia coli ESBL    See previous culture [id number]    Culture - Urine (collected 06-05-23 @ 22:10)  Source: Clean Catch Clean Catch (Midstream)  Final Report (06-08-23 @ 17:29):    >100,000 CFU/ml Escherichia coli ESBL    >100,000 CFU/ml Escherichia coli ESBL #2    Multiple Morphological Strains  Organism: Escherichia coli ESBL  Escherichia coli ESBL (06-08-23 @ 17:29)  Organism: Escherichia coli ESBL (06-08-23 @ 17:29)    RADIOLOGY: < from: Xray Chest 1 View- PORTABLE-Routine (Xray Chest 1 View- PORTABLE-Routine .) (06.12.23 @ 13:31) >  IMPRESSION:   Cardiomegaly. Moderate RIGHT effusion and/or basilar airspace consolidation.  Subtle LEFT retrocardiac airspace disease.    ANTIBIOTICS:  ertapenem  IVPB    ertapenem  IVPB 500 every 24 hours    C-Reactive Protein, Serum: 151 mg/L *H* (06-08-23 @ 06:39)  Sedimentation Rate, Erythrocyte: 100 mm/Hr *H* (06-08-23 @ 06:39)  Sedimentation Rate, Erythrocyte: 92 mm/Hr *H* (06-09-23 @ 05:10)  C-Reactive Protein, Serum: 115 mg/L *H* (06-09-23 @ 05:10)  Sedimentation Rate, Erythrocyte: 97 mm/Hr *H* (06-10-23 @ 05:10)  C-Reactive Protein, Serum: 92 mg/L *H* (06-10-23 @ 05:10)  Sedimentation Rate, Erythrocyte: 104 mm/Hr *H* (06-13-23 @ 07:35)    IMPRESSION:  89 yo male from home, bedridden, has HHA, has h/o dementia, CKD, /chronic indwelling Valencia, Afib on Eliquis, HTN who presented to ED for urinary retention/blocked Valencia cath with decreased urinary output and hematuria, noted worsening renal function(TERENCE on CKD)  + low grade temps, more lethargic, concerns form aspiration pneumonia, CXR with R lung effusion vs infiltrates ? he is on adequate abx coverage    -Sepsis due to BSI/ UTI 2/2 ESBL E coli.  -ESBL E coli bacteremia 2/2 UTI (BC 6/5 and 6/7 +; BC 6/8 NGTD).  -CA-UTI due to ESBL E coli.  -Aspiration pneumonitis   -Obstructive Uropathy- pt has chronic indwelling valencia and multiple bladder diverticula seen in previous imaging, prostate normal in CT, cystoscopy recommended in the past, bladder CA in the differential.   -Obstructive Uropathy BL hydronephrosis known since 2022 as per records, w/chronic indwelling Valencia cath, now with Moderate hydro with hydroureter identified to the level of the urinarybladder.  -TERENCE on CKD likely 2/2 obstructive uropathy.  -Acute encephalopathy sepsis / Dementia.     PLAN:   Continue Ertapenem 500 mg q24 hrs for total 14 days after negative blood cultures (6/8- 6/22)   Follow up blood cx  Trend ESR and CRP  Monitor renal function, consider repeat renal imaging to assess hydronephrosis   Trend WBC  Aspiration precautions  Valencia care(dandre care), change cath q14-21 days  Consider GOC discussion   Discussed with medicine attending      Please reach ID with any questions or concerns  Dr. Darlene Blank  Available in Teams

## 2023-06-13 NOTE — PROGRESS NOTE ADULT - PROBLEM SELECTOR PLAN 1
- Continued increase in leukocytosis and rest as below-Renal US pending-f/u results   - Temp, leukocytosis, and labile BP noted-Repeat Bld cx's pending-f/u results.  CXR pending-f/u results.    - P/w sepsis, valencia exchanged in ED   - 6/7 hypotensive overnight  - Currently on IVF.  Reassess in AM.   - (+) Bld & Ucx-ESBL Ecoli   - Currently on Ertapenem   - Repeat Bld cx's from 6/7, one bottle (+) GNR   - Repeat Bld cx's pending from 6/8 NGTD    - ID-Dr. Chase consulted, appreciated - Continued increase in leukocytosis and rest as below-Renal US pending-f/u results   - Temp, leukocytosis, and labile BP noted-Repeat Bld cx's pending-f/u results.  CXR noted above.    - P/w sepsis, valencia exchanged in ED   - 6/7 hypotensive overnight  - Currently on IVF.  Reassess in AM.   - (+) Bld & Ucx-ESBL Ecoli   - Currently on Ertapenem   - Repeat Bld cx's from 6/7, one bottle (+) GNR   - Repeat Bld cx's pending from 6/8 NGTD    - ID-Dr. Chase consulted, appreciated

## 2023-06-13 NOTE — PROGRESS NOTE ADULT - SUBJECTIVE AND OBJECTIVE BOX
NP Note discussed with  primary attending    Patient is a 89y old  Male who presents with a chief complaint of Urinary retention (13 Jun 2023 12:25)      INTERVAL HPI/OVERNIGHT EVENTS: Limited exam in demented pt.    MEDICATIONS  (STANDING):  apixaban 2.5 milliGRAM(s) Oral two times a day  dextrose 5% + sodium chloride 0.45%. 1000 milliLiter(s) (75 mL/Hr) IV Continuous <Continuous>  ertapenem  IVPB      ertapenem  IVPB 500 milliGRAM(s) IV Intermittent every 24 hours  pantoprazole    Tablet 40 milliGRAM(s) Oral before breakfast  polyethylene glycol 3350 17 Gram(s) Oral daily  senna 2 Tablet(s) Oral at bedtime    MEDICATIONS  (PRN):      __________________________________________________  REVIEW OF SYSTEMS:  Limited exam in demented pt     CONSTITUTIONAL: No fever  EYES: No acute visual disturbances  NECK: No pain or stiffness  RESPIRATORY: No cough; No shortness of breath  CARDIOVASCULAR: No chest pain, no palpitations  GASTROINTESTINAL: No pain. No nausea or vomiting.  No diarrhea   NEUROLOGICAL: No headache or numbness, no tremors  MUSCULOSKELETAL: No joint pain, no muscle pain  GENITOURINARY: No dysuria, no frequency, no hesitancy  PSYCHIATRY: No depression , no anxiety  ALL OTHER  ROS negative        Vital Signs Last 24 Hrs  T(C): 37.4 (13 Jun 2023 05:12), Max: 37.9 (12 Jun 2023 20:41)  T(F): 99.4 (13 Jun 2023 05:12), Max: 100.2 (12 Jun 2023 20:41)  HR: 90 (13 Jun 2023 05:12) (85 - 90)  BP: 110/74 (13 Jun 2023 05:12) (103/65 - 110/74)  RR: 18 (13 Jun 2023 05:12) (17 - 18)  SpO2: 97% (13 Jun 2023 05:12) (96% - 97%)    Parameters below as of 13 Jun 2023 05:12  Patient On (Oxygen Delivery Method): room air        ________________________________________________  PHYSICAL EXAM:  Limited exam in demented pt   GENERAL: NAD  HEENT: Normocephalic;  conjunctivae and sclerae clear; moist mucous membranes   NECK : Supple  CHEST/LUNG: Clear to auscultation bilaterally with good air entry   HEART: S1 S2  regular; no murmurs, gallops or rubs  ABDOMEN: Soft, Nontender, Nondistended; Bowel sounds present x 4 quad.  (+) Vogel.    EXTREMITIES: No cyanosis; no edema; no calf tenderness  SKIN: Warm and dry; (+) DTI Coccyx   NERVOUS SYSTEM:  Awake: Oriented to person, not place and time; no new deficits  _________________________________________________  LABS:                        9.2    21.64 )-----------( 419      ( 13 Jun 2023 07:35 )             29.0     06-13    139  |  113<H>  |  56<H>  ----------------------------<  106<H>  4.0   |  20<L>  |  2.49<H>    Ca    8.4      13 Jun 2023 07:35  Phos  3.7     06-13  Mg     1.5     06-13    TPro  7.3  /  Alb  2.1<L>  /  TBili  0.7  /  DBili  x   /  AST  25  /  ALT  23  /  AlkPhos  179<H>  06-13        CAPILLARY BLOOD GLUCOSE            RADIOLOGY & ADDITIONAL TESTS:    Imaging Personally Reviewed:  YES    Consultant(s) Notes Reviewed:   YES    Care Discussed with Consultants :     Plan of care was discussed with patient and /or primary care giver; all questions and concerns were addressed and care was aligned with patient's wishes.     NP Note discussed with  primary attending    Patient is a 89y old  Male who presents with a chief complaint of Urinary retention (13 Jun 2023 12:25)      INTERVAL HPI/OVERNIGHT EVENTS: Limited exam in demented pt.    MEDICATIONS  (STANDING):  apixaban 2.5 milliGRAM(s) Oral two times a day  dextrose 5% + sodium chloride 0.45%. 1000 milliLiter(s) (75 mL/Hr) IV Continuous <Continuous>  ertapenem  IVPB      ertapenem  IVPB 500 milliGRAM(s) IV Intermittent every 24 hours  pantoprazole    Tablet 40 milliGRAM(s) Oral before breakfast  polyethylene glycol 3350 17 Gram(s) Oral daily  senna 2 Tablet(s) Oral at bedtime    MEDICATIONS  (PRN):      __________________________________________________  REVIEW OF SYSTEMS:  Limited exam in demented pt     CONSTITUTIONAL: No fever  EYES: No acute visual disturbances  NECK: No pain or stiffness  RESPIRATORY: No cough; No shortness of breath  CARDIOVASCULAR: No chest pain, no palpitations  GASTROINTESTINAL: No pain. No nausea or vomiting.  No diarrhea   NEUROLOGICAL: No headache or numbness, no tremors  MUSCULOSKELETAL: No joint pain, no muscle pain  GENITOURINARY: No dysuria, no frequency, no hesitancy  PSYCHIATRY: No depression , no anxiety  ALL OTHER  ROS negative        Vital Signs Last 24 Hrs  T(C): 37.4 (13 Jun 2023 05:12), Max: 37.9 (12 Jun 2023 20:41)  T(F): 99.4 (13 Jun 2023 05:12), Max: 100.2 (12 Jun 2023 20:41)  HR: 90 (13 Jun 2023 05:12) (85 - 90)  BP: 110/74 (13 Jun 2023 05:12) (103/65 - 110/74)  RR: 18 (13 Jun 2023 05:12) (17 - 18)  SpO2: 97% (13 Jun 2023 05:12) (96% - 97%)    Parameters below as of 13 Jun 2023 05:12  Patient On (Oxygen Delivery Method): room air        ________________________________________________  PHYSICAL EXAM:  Limited exam in demented pt   GENERAL: NAD  HEENT: Normocephalic;  conjunctivae and sclerae clear; moist mucous membranes   NECK : Supple  CHEST/LUNG: Clear to auscultation bilaterally with good air entry   HEART: S1 S2  regular; no murmurs, gallops or rubs  ABDOMEN: Soft, Nontender, Nondistended; Bowel sounds present x 4 quad.  (+) Vogel.    EXTREMITIES: No cyanosis; no edema; no calf tenderness  SKIN: Warm and dry; (+) DTI Coccyx   NERVOUS SYSTEM:  Awake: Oriented to person, not place and time; no new deficits  _________________________________________________  LABS:                        9.2    21.64 )-----------( 419      ( 13 Jun 2023 07:35 )             29.0     06-13    139  |  113<H>  |  56<H>  ----------------------------<  106<H>  4.0   |  20<L>  |  2.49<H>    Ca    8.4      13 Jun 2023 07:35  Phos  3.7     06-13  Mg     1.5     06-13    TPro  7.3  /  Alb  2.1<L>  /  TBili  0.7  /  DBili  x   /  AST  25  /  ALT  23  /  AlkPhos  179<H>  06-13        CAPILLARY BLOOD GLUCOSE            RADIOLOGY & ADDITIONAL TESTS:    cxr< from: Xray Chest 1 View- PORTABLE-Routine (Xray Chest 1 View- PORTABLE-Routine .) (06.12.23 @ 13:31) >  ACC: [id number] EXAM:  XR CHEST PORTABLE ROUTINE 1V   ORDERED BY: NEMO WALDEN     PROCEDURE DATE:  06/12/2023          INTERPRETATION:  Portable chest radiograph    CLINICAL INFORMATION: Infection. Follow-up    TECHNIQUE:  Portable  AP chest radiograph.    COMPARISON: CT abdomen 6/6/2023 .    FINDINGS:  CATHETERS AND TUBES: None    PULMONARY:    Faint LEFT retrocardiac a focal patchy airspace disease...  Moderate RIGHT effusion and/or basilar airspace consolidation obscuring   RIGHT diaphragm contour.  Upper zones clear.  LEFT upper hemithorax skin fold artifact.  .   No pneumothorax.    HEART/VASCULAR: The  heart is enlarged in transverse diameter. .    BONES: Visualized osseous thorax intact.    IMPRESSION:   Cardiomegaly.  Moderate RIGHT effusion and/or basilar airspace consolidation.   Subtle LEFT retrocardiac airspace disease.    --- End of Report ---            BHAVESH RANGEL MD; Attending Radiologist  This document has been electronically signed. Jun 13 2023  2:30PM    < end of copied text >      Imaging Personally Reviewed:  YES    Consultant(s) Notes Reviewed:   YES    Care Discussed with Consultants :     Plan of care was discussed with patient and /or primary care giver; all questions and concerns were addressed and care was aligned with patient's wishes.

## 2023-06-13 NOTE — PROGRESS NOTE ADULT - ASSESSMENT
#Sepsis 2/2 ESBL bacteremia  #Complicated UTI/ CAUTI  #Acute encephalopathy with underlying dementia 2/2 infection  #Hematuria - traumatic Valencia vs blood thinner induced?  #TERENCE on CKD 3, improving  #Obstructive uropathy and pre-renal TERENCE  #NAGMA  #Hyponatremia, resolved    89yM with PMH of dementia, BPH requiring chronic valencia, a-fib on Eliquis, HTN, who came from home with decreased urine output. Patient admitted with sepsis due to ESBL bacteremia and chronic CAUTI.    -Valencia catheter changed in ED  -Worsening leukocytosis, low grade temp, unclear etiology. R/O aspiration pneumonia, CXR clear, and patient is already on ertapenem  -Repeat US kidneys today to look for improvement in hydronephrosis, discussed with ID attending, Dr. Chase  -Ertapenem - day 8  -NPO due to acute encephalopathy/lethargy; IVF with D5 1/2NS  -Repeat BCx on 06/12 pending   -ESR/CRP elevated  -Bowel regimen  -ID, urology following  -DVT ppx: Eliquis

## 2023-06-13 NOTE — PROGRESS NOTE ADULT - SUBJECTIVE AND OBJECTIVE BOX
S: Patient seen and examined at bedside. No acute events overnight. Appears lethargic.     REVIEW OF SYSTEMS:  Unable to obtain 2/2 poor mentation     O:  Vital Signs Last 24 Hrs  T(C): 37.4 (13 Jun 2023 05:12), Max: 37.9 (12 Jun 2023 20:41)  T(F): 99.4 (13 Jun 2023 05:12), Max: 100.2 (12 Jun 2023 20:41)  HR: 90 (13 Jun 2023 05:12) (85 - 90)  BP: 110/74 (13 Jun 2023 05:12) (103/65 - 110/74)  BP(mean): --  RR: 18 (13 Jun 2023 05:12) (17 - 18)  SpO2: 97% (13 Jun 2023 05:12) (96% - 97%)    Parameters below as of 13 Jun 2023 05:12  Patient On (Oxygen Delivery Method): room air    Constitutional/General: Well developed, vitals reviewed  EYE: Symmetrical pupils, conjunctiva clear   ENT: adentition, oropharynx clear  NECK: No visual masses, no JVD  CHEST: No respiratory distress, bilateral symmetrical chest rise  ABDOMEN: Nondistended, no visual masses  SKIN: No rash, warm, dry  NEURO: Lethargic, Cranial nerves grossly intact, moves all extremities, follows commands    apixaban 2.5 milliGRAM(s) Oral two times a day  dextrose 5%. 1000 milliLiter(s) IV Continuous <Continuous>  ertapenem  IVPB      ertapenem  IVPB 500 milliGRAM(s) IV Intermittent every 24 hours  pantoprazole    Tablet 40 milliGRAM(s) Oral before breakfast  polyethylene glycol 3350 17 Gram(s) Oral daily  senna 2 Tablet(s) Oral at bedtime                            9.2    21.64 )-----------( 419      ( 13 Jun 2023 07:35 )             29.0       06-13    139  |  113<H>  |  56<H>  ----------------------------<  106<H>  4.0   |  20<L>  |  2.49<H>    Ca    8.4      13 Jun 2023 07:35  Phos  3.7     06-13  Mg     1.5     06-13    TPro  7.3  /  Alb  2.1<L>  /  TBili  0.7  /  DBili  x   /  AST  25  /  ALT  23  /  AlkPhos  179<H>  06-13

## 2023-06-13 NOTE — ADVANCED PRACTICE NURSE CONSULT - ASSESSMENT
This is a 89yr old male patient admitted for UTI, presenting with the following:  -There is an intact DTI to the Coccyx (1cm x 0.5cm) without drainage  -There is evidence of blanchable erythema to the Bilateral Heels  -There is a Skin Tear to the R. Lat Thigh with red tissue and scant drainage
This is a 89yr old male patient admitted for UTI, presenting with the following:  -There is an intact DTI to the Coccyx (0.3cm x 0.2cm) without drainage  -There is evidence of blanchable erythema to the Bilateral Heels

## 2023-06-14 DIAGNOSIS — N17.9 ACUTE KIDNEY FAILURE, UNSPECIFIED: ICD-10-CM

## 2023-06-14 DIAGNOSIS — Z51.5 ENCOUNTER FOR PALLIATIVE CARE: ICD-10-CM

## 2023-06-14 DIAGNOSIS — F03.C0 UNSPECIFIED DEMENTIA, SEVERE, WITHOUT BEHAVIORAL DISTURBANCE, PSYCHOTIC DISTURBANCE, MOOD DISTURBANCE, AND ANXIETY: ICD-10-CM

## 2023-06-14 DIAGNOSIS — E43 UNSPECIFIED SEVERE PROTEIN-CALORIE MALNUTRITION: ICD-10-CM

## 2023-06-14 DIAGNOSIS — R53.81 OTHER MALAISE: ICD-10-CM

## 2023-06-14 LAB
-  AMIKACIN: SIGNIFICANT CHANGE UP
-  AMIKACIN: SIGNIFICANT CHANGE UP
-  AMOXICILLIN/CLAVULANIC ACID: SIGNIFICANT CHANGE UP
-  AMOXICILLIN/CLAVULANIC ACID: SIGNIFICANT CHANGE UP
-  AMPICILLIN/SULBACTAM: SIGNIFICANT CHANGE UP
-  AMPICILLIN/SULBACTAM: SIGNIFICANT CHANGE UP
-  AMPICILLIN: SIGNIFICANT CHANGE UP
-  AMPICILLIN: SIGNIFICANT CHANGE UP
-  AZTREONAM: SIGNIFICANT CHANGE UP
-  AZTREONAM: SIGNIFICANT CHANGE UP
-  CEFAZOLIN: SIGNIFICANT CHANGE UP
-  CEFAZOLIN: SIGNIFICANT CHANGE UP
-  CEFEPIME: SIGNIFICANT CHANGE UP
-  CEFEPIME: SIGNIFICANT CHANGE UP
-  CEFOTAXIME: SIGNIFICANT CHANGE UP
-  CEFOTAXIME: SIGNIFICANT CHANGE UP
-  CEFTAZIDIME: SIGNIFICANT CHANGE UP
-  CEFTAZIDIME: SIGNIFICANT CHANGE UP
-  CEFTRIAXONE: SIGNIFICANT CHANGE UP
-  CEFTRIAXONE: SIGNIFICANT CHANGE UP
-  CEFUROXIME: SIGNIFICANT CHANGE UP
-  CEFUROXIME: SIGNIFICANT CHANGE UP
-  CIPROFLOXACIN: SIGNIFICANT CHANGE UP
-  CIPROFLOXACIN: SIGNIFICANT CHANGE UP
-  ERTAPENEM: SIGNIFICANT CHANGE UP
-  ERTAPENEM: SIGNIFICANT CHANGE UP
-  GENTAMICIN: SIGNIFICANT CHANGE UP
-  GENTAMICIN: SIGNIFICANT CHANGE UP
-  IMIPENEM: SIGNIFICANT CHANGE UP
-  IMIPENEM: SIGNIFICANT CHANGE UP
-  LEVOFLOXACIN: SIGNIFICANT CHANGE UP
-  LEVOFLOXACIN: SIGNIFICANT CHANGE UP
-  MEROPENEM: SIGNIFICANT CHANGE UP
-  MEROPENEM: SIGNIFICANT CHANGE UP
-  MINOCYCLINE: SIGNIFICANT CHANGE UP
-  MINOCYCLINE: SIGNIFICANT CHANGE UP
-  PIPERACILLIN/TAZOBACTAM: SIGNIFICANT CHANGE UP
-  PIPERACILLIN/TAZOBACTAM: SIGNIFICANT CHANGE UP
-  TIGECYCLINE: SIGNIFICANT CHANGE UP
-  TIGECYCLINE: SIGNIFICANT CHANGE UP
-  TOBRAMYCIN: SIGNIFICANT CHANGE UP
-  TOBRAMYCIN: SIGNIFICANT CHANGE UP
-  TRIMETHOPRIM/SULFAMETHOXAZOLE: SIGNIFICANT CHANGE UP
-  TRIMETHOPRIM/SULFAMETHOXAZOLE: SIGNIFICANT CHANGE UP
ALBUMIN SERPL ELPH-MCNC: 1.9 G/DL — LOW (ref 3.5–5)
ALP SERPL-CCNC: 152 U/L — HIGH (ref 40–120)
ALT FLD-CCNC: 20 U/L DA — SIGNIFICANT CHANGE UP (ref 10–60)
ANION GAP SERPL CALC-SCNC: 6 MMOL/L — SIGNIFICANT CHANGE UP (ref 5–17)
AST SERPL-CCNC: 25 U/L — SIGNIFICANT CHANGE UP (ref 10–40)
BASOPHILS # BLD AUTO: 0.04 K/UL — SIGNIFICANT CHANGE UP (ref 0–0.2)
BASOPHILS NFR BLD AUTO: 0.2 % — SIGNIFICANT CHANGE UP (ref 0–2)
BILIRUB SERPL-MCNC: 0.7 MG/DL — SIGNIFICANT CHANGE UP (ref 0.2–1.2)
BUN SERPL-MCNC: 55 MG/DL — HIGH (ref 7–18)
CALCIUM SERPL-MCNC: 8.1 MG/DL — LOW (ref 8.4–10.5)
CHLORIDE SERPL-SCNC: 115 MMOL/L — HIGH (ref 96–108)
CO2 SERPL-SCNC: 18 MMOL/L — LOW (ref 22–31)
CREAT SERPL-MCNC: 2.29 MG/DL — HIGH (ref 0.5–1.3)
CULTURE RESULTS: SIGNIFICANT CHANGE UP
CULTURE RESULTS: SIGNIFICANT CHANGE UP
EGFR: 27 ML/MIN/1.73M2 — LOW
EOSINOPHIL # BLD AUTO: 0.32 K/UL — SIGNIFICANT CHANGE UP (ref 0–0.5)
EOSINOPHIL NFR BLD AUTO: 1.5 % — SIGNIFICANT CHANGE UP (ref 0–6)
GLUCOSE BLDC GLUCOMTR-MCNC: 87 MG/DL — SIGNIFICANT CHANGE UP (ref 70–99)
GLUCOSE SERPL-MCNC: 101 MG/DL — HIGH (ref 70–99)
HCT VFR BLD CALC: 26.7 % — LOW (ref 39–50)
HGB BLD-MCNC: 8.4 G/DL — LOW (ref 13–17)
IMM GRANULOCYTES NFR BLD AUTO: 1.7 % — HIGH (ref 0–0.9)
LYMPHOCYTES # BLD AUTO: 1.58 K/UL — SIGNIFICANT CHANGE UP (ref 1–3.3)
LYMPHOCYTES # BLD AUTO: 7.6 % — LOW (ref 13–44)
MAGNESIUM SERPL-MCNC: 2.1 MG/DL — SIGNIFICANT CHANGE UP (ref 1.6–2.6)
MCHC RBC-ENTMCNC: 25.1 PG — LOW (ref 27–34)
MCHC RBC-ENTMCNC: 31.5 GM/DL — LOW (ref 32–36)
MCV RBC AUTO: 79.7 FL — LOW (ref 80–100)
METHOD TYPE: SIGNIFICANT CHANGE UP
METHOD TYPE: SIGNIFICANT CHANGE UP
MONOCYTES # BLD AUTO: 1.07 K/UL — HIGH (ref 0–0.9)
MONOCYTES NFR BLD AUTO: 5.2 % — SIGNIFICANT CHANGE UP (ref 2–14)
NEUTROPHILS # BLD AUTO: 17.38 K/UL — HIGH (ref 1.8–7.4)
NEUTROPHILS NFR BLD AUTO: 83.8 % — HIGH (ref 43–77)
NRBC # BLD: 0 /100 WBCS — SIGNIFICANT CHANGE UP (ref 0–0)
ORGANISM # SPEC MICROSCOPIC CNT: SIGNIFICANT CHANGE UP
PHOSPHATE SERPL-MCNC: 3.8 MG/DL — SIGNIFICANT CHANGE UP (ref 2.5–4.5)
PLATELET # BLD AUTO: 398 K/UL — SIGNIFICANT CHANGE UP (ref 150–400)
POTASSIUM SERPL-MCNC: 3.5 MMOL/L — SIGNIFICANT CHANGE UP (ref 3.5–5.3)
POTASSIUM SERPL-SCNC: 3.5 MMOL/L — SIGNIFICANT CHANGE UP (ref 3.5–5.3)
PROT SERPL-MCNC: 6.7 G/DL — SIGNIFICANT CHANGE UP (ref 6–8.3)
RBC # BLD: 3.35 M/UL — LOW (ref 4.2–5.8)
RBC # FLD: 17.5 % — HIGH (ref 10.3–14.5)
SODIUM SERPL-SCNC: 139 MMOL/L — SIGNIFICANT CHANGE UP (ref 135–145)
SPECIMEN SOURCE: SIGNIFICANT CHANGE UP
SPECIMEN SOURCE: SIGNIFICANT CHANGE UP
WBC # BLD: 20.74 K/UL — HIGH (ref 3.8–10.5)
WBC # FLD AUTO: 20.74 K/UL — HIGH (ref 3.8–10.5)

## 2023-06-14 PROCEDURE — 99223 1ST HOSP IP/OBS HIGH 75: CPT

## 2023-06-14 PROCEDURE — 99497 ADVNCD CARE PLAN 30 MIN: CPT | Mod: 25

## 2023-06-14 PROCEDURE — 76775 US EXAM ABDO BACK WALL LIM: CPT | Mod: 26

## 2023-06-14 PROCEDURE — 99233 SBSQ HOSP IP/OBS HIGH 50: CPT | Mod: FS

## 2023-06-14 PROCEDURE — 99232 SBSQ HOSP IP/OBS MODERATE 35: CPT

## 2023-06-14 RX ADMIN — PANTOPRAZOLE SODIUM 40 MILLIGRAM(S): 20 TABLET, DELAYED RELEASE ORAL at 05:10

## 2023-06-14 RX ADMIN — APIXABAN 2.5 MILLIGRAM(S): 2.5 TABLET, FILM COATED ORAL at 19:38

## 2023-06-14 RX ADMIN — ERTAPENEM SODIUM 100 MILLIGRAM(S): 1 INJECTION, POWDER, LYOPHILIZED, FOR SOLUTION INTRAMUSCULAR; INTRAVENOUS at 20:25

## 2023-06-14 RX ADMIN — APIXABAN 2.5 MILLIGRAM(S): 2.5 TABLET, FILM COATED ORAL at 05:09

## 2023-06-14 NOTE — CONSULT NOTE ADULT - PROBLEM SELECTOR RECOMMENDATION 2
Pt is AOx2.  Lethargic.  Bedbound.  Total are.  NO behavior issues.  FAST 7c.  Hospice appropriate.  Discussed dementia trajectory with the pt's family and provided anticipatory guidance. Pt is AOx2.  Lethargic.  Bedbound.  Total are.  NO behavior issues.  FAST 7c.  Hospice appropriate.  Explained the dementia trajectory and that pt is hospice appropriate given his cognitive and functional decline.

## 2023-06-14 NOTE — CONSULT NOTE ADULT - PROBLEM SELECTOR RECOMMENDATION 9
2/2 ESBL E coli. w persistent leucocytosis despite abx.  ID following     Given poor prognosis family agreed for comfort measures only.  Pt to be discharged home with hospice.     Pt is DNR/DNI 2/2 ESBL E coli. w persistent leucocytosis despite abx.  ID following     Given poor prognosis family agreed for comfort measures only/home hospice.  Pt to be discharged home with hospice.     Pt is DNR/DNI

## 2023-06-14 NOTE — PROVIDER CONTACT NOTE (CRITICAL VALUE NOTIFICATION) - NAME OF MD/NP/PA/DO NOTIFIED:
MD Bianchi
SANDOR Pittman
SANDOR Gunderson
SANDOR Silva
SANDOR Gunderson

## 2023-06-14 NOTE — PROGRESS NOTE ADULT - ASSESSMENT
Subjective: NAD, more alert but still encephalopathic, febrile yesterday, no N/V or diarrhea, abd benign, no resp distress, no cough    REVIEW OF SYSTEMS: unable to obtain due to dementia    PE:  Vital Signs Last 24 Hrs  T(C): 36.9 (14 Jun 2023 13:39), Max: 38.3 (13 Jun 2023 20:40)  T(F): 98.5 (14 Jun 2023 13:39), Max: 100.9 (13 Jun 2023 20:40)  HR: 95 (14 Jun 2023 13:39) (95 - 97)  BP: 99/69 (14 Jun 2023 13:39) (91/56 - 99/69)  BP(mean): 71 (13 Jun 2023 20:40) (71 - 71)  RR: 18 (14 Jun 2023 13:39) (18 - 18)  SpO2: 100% (14 Jun 2023 13:39) (96% - 100%)    Parameters below as of 14 Jun 2023 13:39 Patient On (Oxygen Delivery Method): room air    Gen: AOx0, clinically stable, more alert  HEAD:  Atraumatic, Normocephalic  EYES: PERRLA, conjunctiva and sclera clear  ENT: Moist mucous membranes  NECK: Supple, No JVD  CV: S1+S2 normal, no murmurs   Resp: Lungs clear, decrease sounds on bases  Abd: Soft, nontender, +BS  Ext: No LE edema, no cyanosis, pulses +  : + Valencia with yellow urine   IV/Skin: No thrombophlebitis  Msk: no joint swelling  Neuro: AAOx3. No focal deficit    LABS/DIAGNOSTIC TESTS:                        8.4    20.74 )-----------( 398      ( 14 Jun 2023 06:20 )             26.7     WBC Count: 20.74 K/uL (06-14 @ 06:20)  WBC Count: 21.64 K/uL (06-13 @ 07:35)  WBC Count: 18.82 K/uL (06-12 @ 05:29)    06-14    139  |  115<H>  |  55<H>  ----------------------------<  101<H>  3.5   |  18<L>  |  2.29<H>    Ca    8.1<L>      14 Jun 2023 06:20  Phos  3.8     06-14  Mg     2.1     06-14    TPro  6.7  /  Alb  1.9<L>  /  TBili  0.7  /  DBili  x   /  AST  25  /  ALT  20  /  AlkPhos  152<H>  06-14    CULTURES:   Culture - Blood (collected 06-12-23 @ 12:20)  Source: .Blood Blood  Preliminary Report (06-13-23 @ 19:02):    No growth to date.    Culture - Blood (collected 06-12-23 @ 12:10)  Source: .Blood Blood  Preliminary Report (06-13-23 @ 19:02):    No growth to date.    Culture - Blood (collected 06-08-23 @ 06:39)  Source: .Blood Blood  Final Report (06-13-23 @ 15:00):    No Growth Final    Culture - Blood (collected 06-08-23 @ 06:20)  Source: .Blood Blood  Final Report (06-13-23 @ 15:00):    No Growth Final    Culture - Blood (collected 06-07-23 @ 06:10)  Source: .Blood Blood-Peripheral  Gram Stain (06-12-23 @ 08:30):    Growth in anaerobic bottle: Gram Negative Rods  Final Report (06-14-23 @ 08:10):    Growth in anaerobic bottle: Escherichia coli ESBL  Organism: Escherichia coli ESBL (06-14-23 @ 08:10)  Organism: Escherichia coli ESBL (06-14-23 @ 08:10)      Method Type: KRISTI      -  Amikacin: S <=16      -  Amoxicillin/Clavulanic Acid: S <=8/4      -  Ampicillin: R >16 These ampicillin results predict results for amoxicillin      -  Ampicillin/Sulbactam: R 8/4 Enterobacter, Klebsiella aerogenes, Citrobacter, and Serratia may develop resistance during prolonged therapy (3-4 days)      -  Aztreonam: R >16      -  Cefazolin: R >16 Enterobacter, Klebsiella aerogenes, Citrobacter, and Serratia may develop resistance during prolonged therapy (3-4 days)      -  Cefepime: R >16      -  Cefotaxime: R >32      -  Ceftazidime: R >16      -  Ceftriaxone: R >32 Enterobacter, Klebsiella aerogenes, Citrobacter, and Serratia may develop resistance during prolonged therapy      -  Cefuroxime: R >16      -  Ciprofloxacin: R >2      -  Ertapenem: S <=0.5      -  Gentamicin: S <=2      -  Imipenem: S <=1      -  Levofloxacin: R >4      -  Meropenem: S <=1      -  Minocycline: S <=4      -  Piperacillin/Tazobactam: R <=8      -  Tigecycline: S <=2      -  Tobramycin: S <=2      -  Trimethoprim/Sulfamethoxazole: R >2/38    Culture - Blood (collected 06-07-23 @ 06:05)  Source: .Blood Blood-Venous  Gram Stain (06-12-23 @ 07:39):    Growth in anaerobic bottle: Gram Negative Rods  Final Report (06-14-23 @ 08:09):    Growth in anaerobic bottle: Escherichia coli ESBL  Organism: Escherichia coli ESBL (06-14-23 @ 08:09)  Organism: Escherichia coli ESBL (06-14-23 @ 08:09)      Method Type: KRISTI      -  Amikacin: S <=16      -  Amoxicillin/Clavulanic Acid: S <=8/4      -  Ampicillin: R >16 These ampicillin results predict results for amoxicillin      -  Ampicillin/Sulbactam: R 16/8 Enterobacter, Klebsiella aerogenes, Citrobacter, and Serratia may develop resistance during prolonged therapy (3-4 days)      -  Aztreonam: R >16      -  Cefazolin: R >16 Enterobacter, Klebsiella aerogenes, Citrobacter, and Serratia may develop resistance during prolonged therapy (3-4 days)      -  Cefepime: R >16      -  Cefotaxime: R >32      -  Ceftazidime: R >16      -  Ceftriaxone: R >32 Enterobacter, Klebsiella aerogenes, Citrobacter, and Serratia may develop resistance during prolonged therapy      -  Cefuroxime: R >16      -  Ciprofloxacin: R >2      -  Ertapenem: S <=0.5      -  Gentamicin: R >8      -  Imipenem: S <=1      -  Levofloxacin: R >4      -  Meropenem: S <=1      -  Minocycline: S <=4      -  Piperacillin/Tazobactam: R <=8      -  Tigecycline: S <=2      -  Tobramycin: R >8      -  Trimethoprim/Sulfamethoxazole: R >2/38    Culture - Blood (collected 06-05-23 @ 23:18)  Source: .Blood Blood  Gram Stain (06-06-23 @ 17:00):    Growth in aerobic bottle: Gram Negative Rods    Growth in anaerobic bottle: Gram Negative Rods  Final Report (06-08-23 @ 10:09):    Growth in aerobic and anaerobic bottles: Escherichia coli ESBL  Escherichia coli ESBL (06-08-23 @ 10:09)  Organism: Escherichia coli ESBL (06-08-23 @ 10:09)      Method Type: KRISTI      -  Amikacin: S <=16      -  Ampicillin: R >16 These ampicillin results predict results for amoxicillin      -  Ampicillin/Sulbactam: R >16/8 Enterobacter, Klebsiella aerogenes, Citrobacter, and Serratia may develop resistance during prolonged therapy (3-4 days)      -  Aztreonam: R >16      -  Cefazolin: R >16 Enterobacter, Klebsiella aerogenes, Citrobacter, and Serratia may develop resistance during prolonged therapy (3-4 days)      -  Cefepime: R >16      -  Ceftriaxone: R >32 Enterobacter, Klebsiella aerogenes, Citrobacter, and Serratia may develop resistance during prolonged therapy      -  Ciprofloxacin: R >2      -  Ertapenem: S <=0.5      -  Gentamicin: R >8      -  Imipenem: S <=1      -  Levofloxacin: R >4      -  Meropenem: S <=1      -  Piperacillin/Tazobactam: R 64      -  Tobramycin: R >8      -  Trimethoprim/Sulfamethoxazole: S <=0.5/9.5  Organism: Blood Culture PCR (06-08-23 @ 10:09)      Method Type: PCR      -  Escherichia coli: Detec      -  ESBL: Detec      -  CTX-M Resistance Marker: Detec    Culture - Blood (collected 06-05-23 @ 23:18)  Source: .Blood Blood  Gram Stain (06-06-23 @ 21:44):    Growth in anaerobic bottle: Gram Negative Rods    Growth in aerobic bottle: Gram Negative Rods  Final Report (06-08-23 @ 10:10):    Growth in aerobic and anaerobic bottles: Escherichia coli ESBL    See previous culture [id number]    Culture - Urine (collected 06-05-23 @ 22:10)  Source: Clean Catch Clean Catch (Midstream)  Final Report (06-08-23 @ 17:29):    >100,000 CFU/ml Escherichia coli ESBL    >100,000 CFU/ml Escherichia coli ESBL #2    Multiple Morphological Strains  Organism: Escherichia coli ESBL  Escherichia coli ESBL (06-08-23 @ 17:29)  Organism: Escherichia coli ESBL (06-08-23 @ 17:29)      Method Type: KRISTI      -  Amikacin: S <=16      -  Amoxicillin/Clavulanic Acid: S <=8/4      -  Ampicillin: R >16 These ampicillin results predict results for amoxicillin      -  Ampicillin/Sulbactam: S 8/4 Enterobacter, Klebsiella aerogenes, Citrobacter, and Serratia may develop resistance during prolonged therapy (3-4 days)      -  Aztreonam: R >16      -  Cefazolin: R >16 For uncomplicated UTI with K. pneumoniae, E. coli, or P. mirablis: KRISTI <=16 is sensitive and KRISTI >=32 is resistant. This also predicts results for oral agents cefaclor, cefdinir, cefpodoxime, cefprozil, cefuroxime axetil, cephalexin and locarbef for uncomplicated UTI. Note that some isolates may be susceptible to these agents while testing resistant to cefazolin.      -  Cefepime: R >16      -  Ceftriaxone: R >32 Enterobacter, Klebsiella aerogenes, Citrobacter, and Serratia may develop resistance during prolonged therapy      -  Cefuroxime: R >16      -  Ciprofloxacin: R >2      -  Ertapenem: S <=0.5      -  Gentamicin: S <=2      -  Imipenem: S <=1      -  Levofloxacin: R >4      -  Meropenem: S <=1      -  Nitrofurantoin: S <=32 Should not be used to treat pyelonephritis      -  Piperacillin/Tazobactam: S <=8      -  Tobramycin: S <=2      -  Trimethoprim/Sulfamethoxazole: R >2/38  Organism: Escherichia coli ESBL (06-08-23 @ 17:29)      Method Type: KRISTI      -  Amikacin: S <=16      -  Amoxicillin/Clavulanic Acid: S <=8/4      -  Ampicillin: R >16 These ampicillin results predict results for amoxicillin      -  Ampicillin/Sulbactam: S 8/4 Enterobacter, Klebsiella aerogenes, Citrobacter, and Serratia may develop resistance during prolonged therapy (3-4 days)      -  Aztreonam: R >16      -  Cefazolin: R >16 For uncomplicated UTI with K. pneumoniae, E. coli, or P. mirablis: KRISTI <=16 is sensitive and KRISTI >=32 is resistant. This also predicts results for oral agents cefaclor, cefdinir, cefpodoxime, cefprozil, cefuroxime axetil, cephalexin and locarbef for uncomplicated UTI. Note that some isolates may be susceptible to these agents while testing resistant to cefazolin.      -  Cefepime: R >16      -  Ceftriaxone: R >32 Enterobacter, Klebsiella aerogenes, Citrobacter, and Serratia may develop resistance during prolonged therapy      -  Cefuroxime: R >16      -  Ciprofloxacin: R >2      -  Ertapenem: S <=0.5      -  Gentamicin: S <=2      -  Imipenem: S <=1      -  Levofloxacin: R >4      -  Meropenem: S <=1      -  Nitrofurantoin: S <=32 Should not be used to treat pyelonephritis      -  Piperacillin/Tazobactam: S <=8      -  Tobramycin: S <=2      -  Trimethoprim/Sulfamethoxazole: S <=0.5/9.5    RADIOLOGY: < from: US Renal (06.14.23 @ 11:05) >    IMPRESSION:  No evidence of hydronephrosis. Incidental evidence of cholelithiasis and a right pleural effusion.    ANTIBIOTICS:  ertapenem  IVPB 500 every 24 hours  ertapenem  IVPB  C-Reactive Protein, Serum: 151 mg/L *H* (06-08-23 @ 06:39)  Sedimentation Rate, Erythrocyte: 100 mm/Hr *H* (06-08-23 @ 06:39)  Sedimentation Rate, Erythrocyte: 92 mm/Hr *H* (06-09-23 @ 05:10)  C-Reactive Protein, Serum: 115 mg/L *H* (06-09-23 @ 05:10)  Sedimentation Rate, Erythrocyte: 97 mm/Hr *H* (06-10-23 @ 05:10)  C-Reactive Protein, Serum: 92 mg/L *H* (06-10-23 @ 05:10)  Sedimentation Rate, Erythrocyte: 104 mm/Hr *H* (06-13-23 @ 07:35)  C-Reactive Protein, Serum: 109 mg/L *H* (06-13-23 @ 07:35)    IMPRESSION:  89 yo male from home, bedridden, has HHA, has h/o dementia, CKD, /chronic indwelling Valencia, Afib on Eliquis, HTN who presented to ED for urinary retention/blocked Valencia cath with decreased urinary output and hematuria, noted worsening renal function(TERENCE on CKD)  + low grade temps, more lethargic, concerns form aspiration pneumonia, CXR with R lung effusion vs infiltrates ? on ertapenem  Repeat Renal US 6/14 reviewed with resolution of the hydronephrosis.    -Sepsis due to BSI/ UTI 2/2 ESBL E coli.  -ESBL E coli bacteremia 2/2 UTI (BC 6/5 and 6/7 positive ; BC 6/8 & 6/12 NGTD).  -CA-UTI due to ESBL E coli.  -Aspiration pneumonitis   -Obstructive Uropathy- pt has chronic indwelling valencia and multiple bladder diverticula seen in previous imaging, prostate normal in CT, cystoscopy recommended in the past, bladder CA in the differential.   -Obstructive Uropathy BL hydronephrosis known since 2022 as per records, w/chronic indwelling Valencia cath, now with Moderate hydro with hydroureter identified to the level of the urinarybladder.  -TERENCE on CKD likely 2/2 obstructive uropathy.  -Acute encephalopathy sepsis / Dementia.     PLAN:   Continue Ertapenem 500 mg q24 hrs for total 14 days after negative blood cultures (6/8- 6/22)   Follow up blood cx until finalized  Trend WBC  Trend ESR and CRP  Monitor renal function  Aspiration precautions  Valencia care(dandre care), change cath q14-21 days  Consider GOC discussion   Discussed with medicine attending     Please reach ID with any questions or concerns  Dr. Darlene Blank  Available in Teams

## 2023-06-14 NOTE — CONSULT NOTE ADULT - SUBJECTIVE AND OBJECTIVE BOX
[facility] Geriatric and Palliative Consult Service:  Arminda Mccann DO: cell ([phone])  Demond Goss MD: cell ([phone])  Aneudy Larkin NP: cell ([phone])   Henry Gastelum SW: cell ([phone])   Yuni Burton NP: via [employer] Teams    Can contact any Palliative Team member via [employer] Teams for consults and questions      HPI:  This is an 89 yo M from home, bedbound, HHA 8hrs/7days with pmhx of dementia, BPH w/ chronic valencia, afib (on eliquis), HTN that presents to the ED due to decreased urinary output from valencia catheter. Hx obtained from wife, Piper Han , patient unable to provide hx in setting of dementia, she states he is able to talk at baseline, and was AAOx3 last week. He came home from rehab 5 days ago, after being there for 6.5 months ago, last UTI since November, this is not his baseline. Since he got home 5 days ago, he hasn't been himself.      Interval hx: Pt AOx2, confused.  NAD.  Wife, and son Delgado  at the bedside      PAST MEDICAL & SURGICAL HISTORY:  Hematuria      HTN (hypertension)      Arrhythmia      Visual impairment      Dementia      BPH (benign prostatic hyperplasia)      Chronic indwelling Valencia catheter      Atrial fibrillation      TERENCE (acute kidney injury)          SOCIAL HISTORY:    Admitted from:  home   Pt is  has 3 children.  His wife makes medical decisions  [ none ] Substance abuse, [ none ] Tobacco hx, [ none ] Alcohol hx, [ none ] Home Opioid Hx    Restorationism:  Baptism    Piper Han  (spouse)   Phone# [phone]  Delgado Han  (son)           Phone# [phone]      FAMILY HISTORY:   unable to obtain from patient due to poor mentation, family unable to give information, see H&P for history  Baseline ADLs (prior to admission): bedbound, total care    Allergies    No Known Allergies    Intolerances      Present Symptoms: Mild, Moderate, Severe  Pain: denies             Location -                               Aggravating factors -             Quality -             Radiation -             Timing-             Severity (0-10 scale):             Minimal acceptable level (0-10 scale):  Lack of Appetite: denies  Review of Systems: Unable to obtain due to poor mentation]    CPOT:    https://www.sccm.org/getattachment/fwc50h72-6c8n-[email or web address]-6777f8500c1r/Critical-Care-Pain-Observation-Tool-(CPOT) 0  PAIN AD Score:   [email or web address] (press ctrl +  left click to view)      MEDICATIONS  (STANDING):  apixaban 2.5 milliGRAM(s) Oral two times a day  dextrose 5% + sodium chloride 0.45%. 1000 milliLiter(s) (75 mL/Hr) IV Continuous <Continuous>  ertapenem  IVPB 500 milliGRAM(s) IV Intermittent every 24 hours  ertapenem  IVPB      pantoprazole    Tablet 40 milliGRAM(s) Oral before breakfast  polyethylene glycol 3350 17 Gram(s) Oral daily  senna 2 Tablet(s) Oral at bedtime    MEDICATIONS  (PRN):  acetaminophen     Tablet .. 650 milliGRAM(s) Oral every 6 hours PRN Temp greater or equal to 38C (100.4F)      PHYSICAL EXAM:  Vital Signs Last 24 Hrs  T(C): 36.9 (14 Jun 2023 13:39), Max: 38.3 (13 Jun 2023 20:40)  T(F): 98.5 (14 Jun 2023 13:39), Max: 100.9 (13 Jun 2023 20:40)  HR: 95 (14 Jun 2023 13:39) (95 - 97)  BP: 99/69 (14 Jun 2023 13:39) (91/56 - 99/69)  BP(mean): 71 (13 Jun 2023 20:40) (71 - 71)  RR: 18 (14 Jun 2023 13:39) (18 - 18)  SpO2: 100% (14 Jun 2023 13:39) (96% - 100%)    Parameters below as of 14 Jun 2023 13:39  Patient On (Oxygen Delivery Method): room air        General: frail cachetic man, AOx2, confused.  NAD    Palliative Performance Scale/Karnofsky Score: 30%  http://npcrc.org/files/news/palliative_performance_scale_ppsv2.pdf    HEENT: temporal wasting, poor dentition, neck supple  Lungs: unlabored on RA  CV: RRR, S1S2, tachycardia  GI: soft non distended non tender  incontinent  : chronic valencia  Musculoskeletal: weakness x, bedbound, no edema  Skin: no abnormal skin lesions, poor skin turgor  Neuro: able to follow simple commands  Oral intake ability: poor po intake    LABS:                        8.4    20.74 )-----------( 398      ( 14 Jun 2023 06:20 )             26.7     06-14    139  |  115<H>  |  55<H>  ----------------------------<  101<H>  3.5   |  18<L>  |  2.29<H>    Ca    8.1<L>      14 Jun 2023 06:20  Phos  3.8     06-14  Mg     2.1     06-14    TPro  6.7  /  Alb  1.9<L>  /  TBili  0.7  /  DBili  x   /  AST  25  /  ALT  20  /  AlkPhos  152<H>  06-14    < from: CT Abdomen and Pelvis No Cont (06.06.23 @ 02:25) >    ACC: [id number] EXAM:  CT ABDOMEN AND PELVIS   ORDERED BY: JANICE VÁSQUEZ     PROCEDURE DATE:  06/06/2023          INTERPRETATION:  INDICATION: Recent urine output.    COMPARISON:  1.   CT ABDOMEN AND PELVIS WITHOUT AND WITH IV CONTRAST 11/12/2022 5:36 PM  2.   CT ABDOMEN AND PELVIS 11/3/2022 2:35 PM    CONTRAST/COMPLICATIONS:  IV Contrast: None  Oral Contrast: None  Complications: Not applicable    PROCEDURE:  CT of the Abdomen was performed.  Sagittal and coronal reformats were performed.    FINDINGS:  LOWER CHEST: Cardiomegaly. There is small right pleural effusion. Opacity   identified within the posterior/inferior bilateral lower lobes may be   related to dependent atelectasis; underlying infiltrate/consolidation   cannot be excluded.    LIVER: Normal in size. Nodular hepatic parenchymal contour suggest   underlying hepatocellular disease/cirrhosis. No definite focal hepatic   masses are identified on this noncontrast evaluation.  BILE DUCTS: Normal caliber.  GALLBLADDER: Hyperdensity is identified within the gallbladder lumen,   which may represent a gallstone or sludge. No gallbladder wall thickening.  SPLEEN: Within normal limits.  PANCREAS: Within normal limits.  ADRENALS: Within normal limits.  KIDNEYS/URETERS: Moderate bilateral hydronephrosis, with hydroureter   identified to the level of the urinary bladder. Nonspecific stranding of   the bilateral perirenal fat. No renal calculi identified. No definite   space-occupying lesions of the renal parenchyma noted.  BOWEL:Fecal material scattered throughout the colon. No evidence for   mechanical bowel obstruction. No colonic wall thickening. Colonic   diverticulosis. The appendix is not visualized with certainty.    URINARY BLADDER: Completely decompressed by an indwelling Valencia catheter.    Bladder wall thickening or the presence of bladder diverticula cannot be   excluded.  REPRODUCTIVE: Prostate unremarkable    PERITONEUM: No ascites. No free intraperitoneal air.  VESSELS: Atherosclerotic changes.  RETROPERITONEUM/LYMPH NODES: No lymphadenopathy.  ABDOMINAL WALL: Within normal limits.  BONES: Degenerative changes. Minimal retrolisthesis of L5 on S1.    IMPRESSION:  Moderate bilateral hydronephrosis, with hydroureter   identified to the level of the urinarybladder. Nonspecific stranding of   the bilateral perirenal fat.  Completely decompressed by an indwelling   Valencia catheter.  Bladder wall thickening or the presence of bladder   diverticula cannot be excluded. Nodular hepatic parenchymal contour   suggest underlying hepatocellular disease/cirrhosis.    Preliminary report provided by Dr. Nino of [facility].    < end of copied text >      RADIOLOGY & ADDITIONAL STUDIES: reviewed  ADVANCED DIRECTIVES:  DNR/DNI/comfort measures/DNH         [facility] Geriatric and Palliative Consult Service:  Arminda Siobhan DO: cell ([phone])  Demond Goss MD: cell ([phone])  Aneudy Larkin NP: cell ([phone])   Henry Gastelum SW: cell ([phone])   Yuni Burton NP: via [employer]    Can contact any Palliative Team member via [employer] Teams for consults and questions      HPI:  This is an 89 yo M from home, bedbound, HHA 8hrs/7days with pmhx of dementia, BPH w/ chronic valencia, afib (on eliquis), HTN that presents to the ED due to decreased urinary output from valencia catheter. Hx obtained from wife, Piper Han , patient unable to provide hx in setting of dementia, she states he is able to talk at baseline, and was AAOx3 last week. He came home from rehab 5 days ago, after being there for 6.5 months ago, last UTI since November, this is not his baseline. Since he got home 5 days ago, he hasn't been himself.      Interval hx: Pt lethargic,  AOx2, confused.  NAD.  Wife, and son Delgado  at the bedside      PAST MEDICAL & SURGICAL HISTORY:  Hematuria      HTN (hypertension)      Arrhythmia      Visual impairment      Dementia      BPH (benign prostatic hyperplasia)      Chronic indwelling Valencia catheter      Atrial fibrillation      TERENCE (acute kidney injury)          SOCIAL HISTORY:    Admitted from:  home   Pt is  has 3 children.  His wife makes medical decisions  [ none ] Substance abuse, [ none ] Tobacco hx, [ none ] Alcohol hx, [ none ] Home Opioid Hx    Evangelical:  Hindu    Piper Han  (spouse)   Phone# [phone]  Delgado Han  (son)           Phone# [phone]      FAMILY HISTORY:   unable to obtain from patient due to poor mentation, family unable to give information, see H&P for history  Baseline ADLs (prior to admission): bedbound, total care    Allergies    No Known Allergies    Intolerances      Present Symptoms: Mild, Moderate, Severe  Pain: denies             Location -                               Aggravating factors -             Quality -             Radiation -             Timing-             Severity (0-10 scale):             Minimal acceptable level (0-10 scale):  Lack of Appetite: denies  Review of Systems: Unable to obtain due to poor mentation]    CPOT:    https://www.sccm.org/getattachment/nyj06c43-0f8v-3z9b-9o2g-6949t4023l1j/Critical-Care-Pain-Observation-Tool-(CPOT) 0  PAIN AD Score:   [email or web address] (press ctrl +  left click to view)      MEDICATIONS  (STANDING):  apixaban 2.5 milliGRAM(s) Oral two times a day  dextrose 5% + sodium chloride 0.45%. 1000 milliLiter(s) (75 mL/Hr) IV Continuous <Continuous>  ertapenem  IVPB 500 milliGRAM(s) IV Intermittent every 24 hours  ertapenem  IVPB      pantoprazole    Tablet 40 milliGRAM(s) Oral before breakfast  polyethylene glycol 3350 17 Gram(s) Oral daily  senna 2 Tablet(s) Oral at bedtime    MEDICATIONS  (PRN):  acetaminophen     Tablet .. 650 milliGRAM(s) Oral every 6 hours PRN Temp greater or equal to 38C (100.4F)      PHYSICAL EXAM:  Vital Signs Last 24 Hrs  T(C): 36.9 (14 Jun 2023 13:39), Max: 38.3 (13 Jun 2023 20:40)  T(F): 98.5 (14 Jun 2023 13:39), Max: 100.9 (13 Jun 2023 20:40)  HR: 95 (14 Jun 2023 13:39) (95 - 97)  BP: 99/69 (14 Jun 2023 13:39) (91/56 - 99/69)  BP(mean): 71 (13 Jun 2023 20:40) (71 - 71)  RR: 18 (14 Jun 2023 13:39) (18 - 18)  SpO2: 100% (14 Jun 2023 13:39) (96% - 100%)    Parameters below as of 14 Jun 2023 13:39  Patient On (Oxygen Delivery Method): room air        General: frail cachetic man, AOx2, confused.  NAD    Palliative Performance Scale/Karnofsky Score: 30%  http://npcrc.org/files/news/palliative_performance_scale_ppsv2.pdf    HEENT: temporal wasting, poor dentition, neck supple  Lungs: unlabored on RA  CV: RRR, S1S2, tachycardia  GI: soft non distended non tender  incontinent  : chronic valencia  Musculoskeletal: weakness x, bedbound, no edema  Skin: no abnormal skin lesions, poor skin turgor  Neuro: able to follow simple commands  Oral intake ability: poor po intake    LABS:                        8.4    20.74 )-----------( 398      ( 14 Jun 2023 06:20 )             26.7     06-14    139  |  115<H>  |  55<H>  ----------------------------<  101<H>  3.5   |  18<L>  |  2.29<H>    Ca    8.1<L>      14 Jun 2023 06:20  Phos  3.8     06-14  Mg     2.1     06-14    TPro  6.7  /  Alb  1.9<L>  /  TBili  0.7  /  DBili  x   /  AST  25  /  ALT  20  /  AlkPhos  152<H>  06-14    < from: CT Abdomen and Pelvis No Cont (06.06.23 @ 02:25) >    ACC: [id number] EXAM:  CT ABDOMEN AND PELVIS   ORDERED BY: JANICE VÁSQUEZ     PROCEDURE DATE:  06/06/2023          INTERPRETATION:  INDICATION: Recent urine output.    COMPARISON:  1.   CT ABDOMEN AND PELVIS WITHOUT AND WITH IV CONTRAST 11/12/2022 5:36 PM  2.   CT ABDOMEN AND PELVIS 11/3/2022 2:35 PM    CONTRAST/COMPLICATIONS:  IV Contrast: None  Oral Contrast: None  Complications: Not applicable    PROCEDURE:  CT of the Abdomen was performed.  Sagittal and coronal reformats were performed.    FINDINGS:  LOWER CHEST: Cardiomegaly. There is small right pleural effusion. Opacity   identified within the posterior/inferior bilateral lower lobes may be   related to dependent atelectasis; underlying infiltrate/consolidation   cannot be excluded.    LIVER: Normal in size. Nodular hepatic parenchymal contour suggest   underlying hepatocellular disease/cirrhosis. No definite focal hepatic   masses are identified on this noncontrast evaluation.  BILE DUCTS: Normal caliber.  GALLBLADDER: Hyperdensity is identified within the gallbladder lumen,   which may represent a gallstone or sludge. No gallbladder wall thickening.  SPLEEN: Within normal limits.  PANCREAS: Within normal limits.  ADRENALS: Within normal limits.  KIDNEYS/URETERS: Moderate bilateral hydronephrosis, with hydroureter   identified to the level of the urinary bladder. Nonspecific stranding of   the bilateral perirenal fat. No renal calculi identified. No definite   space-occupying lesions of the renal parenchyma noted.  BOWEL:Fecal material scattered throughout the colon. No evidence for   mechanical bowel obstruction. No colonic wall thickening. Colonic   diverticulosis. The appendix is not visualized with certainty.    URINARY BLADDER: Completely decompressed by an indwelling Valencia catheter.    Bladder wall thickening or the presence of bladder diverticula cannot be   excluded.  REPRODUCTIVE: Prostate unremarkable    PERITONEUM: No ascites. No free intraperitoneal air.  VESSELS: Atherosclerotic changes.  RETROPERITONEUM/LYMPH NODES: No lymphadenopathy.  ABDOMINAL WALL: Within normal limits.  BONES: Degenerative changes. Minimal retrolisthesis of L5 on S1.    IMPRESSION:  Moderate bilateral hydronephrosis, with hydroureter   identified to the level of the urinarybladder. Nonspecific stranding of   the bilateral perirenal fat.  Completely decompressed by an indwelling   Valencia catheter.  Bladder wall thickening or the presence of bladder   diverticula cannot be excluded. Nodular hepatic parenchymal contour   suggest underlying hepatocellular disease/cirrhosis.    Preliminary report provided by Dr. Nino of [facility].    < end of copied text >      RADIOLOGY & ADDITIONAL STUDIES: reviewed  ADVANCED DIRECTIVES:  DNR/DNI/comfort measures/DNH

## 2023-06-14 NOTE — PROGRESS NOTE ADULT - TIME BILLING
- Review of records, vital signs and daily labs, microbiology and other Infectious Diseases related work up  - General physical examination.  - Generation of Infectious Diseases treatment plan.  - Coordination of care with the multidisciplinary team.  -Counseling of the patient and/or family members.

## 2023-06-14 NOTE — CONSULT NOTE ADULT - PROBLEM SELECTOR RECOMMENDATION 5
Pt is bedbound. Total care.  High risk for skin failure  Supportive care  Freq positioning    Comfort only

## 2023-06-14 NOTE — PROGRESS NOTE ADULT - SUBJECTIVE AND OBJECTIVE BOX
NP Note discussed with  primary attending    Patient is a 89y old  Male who presents with a chief complaint of Urinary retention (13 Jun 2023 14:43)      INTERVAL HPI/OVERNIGHT EVENTS: Pt aroused to touch and voice.  Denied pain.  Limited exam in demented pt.      MEDICATIONS  (STANDING):  apixaban 2.5 milliGRAM(s) Oral two times a day  dextrose 5% + sodium chloride 0.45%. 1000 milliLiter(s) (75 mL/Hr) IV Continuous <Continuous>  ertapenem  IVPB 500 milliGRAM(s) IV Intermittent every 24 hours  ertapenem  IVPB      pantoprazole    Tablet 40 milliGRAM(s) Oral before breakfast  polyethylene glycol 3350 17 Gram(s) Oral daily  senna 2 Tablet(s) Oral at bedtime    MEDICATIONS  (PRN):  acetaminophen     Tablet .. 650 milliGRAM(s) Oral every 6 hours PRN Temp greater or equal to 38C (100.4F)      __________________________________________________  REVIEW OF SYSTEMS:  Limited exam in demented pt     CONSTITUTIONAL: No fever  EYES: No acute visual disturbances  NECK: No pain or stiffness  RESPIRATORY: No cough; No shortness of breath  CARDIOVASCULAR: No chest pain, no palpitations  GASTROINTESTINAL: No pain. No nausea or vomiting.  No diarrhea   NEUROLOGICAL: No headache or numbness, no tremors  MUSCULOSKELETAL: No joint pain, no muscle pain  GENITOURINARY: No dysuria, no frequency, no hesitancy  PSYCHIATRY: No depression , no anxiety  ALL OTHER  ROS negative      Vital Signs Last 24 Hrs  T(C): 37 (14 Jun 2023 05:17), Max: 38.3 (13 Jun 2023 20:40)  T(F): 98.6 (14 Jun 2023 05:17), Max: 100.9 (13 Jun 2023 20:40)  HR: 95 (14 Jun 2023 06:57) (95 - 98)  BP: 94/51 (14 Jun 2023 06:57) (91/56 - 98/58)  BP(mean): 71 (13 Jun 2023 20:40) (71 - 71)  RR: 18 (14 Jun 2023 05:17) (17 - 18)  SpO2: 96% (14 Jun 2023 05:17) (92% - 98%)    Parameters below as of 14 Jun 2023 05:17  Patient On (Oxygen Delivery Method): room air        ________________________________________________  PHYSICAL EXAM:  Limited exam in demented pt   GENERAL: NAD  HEENT: Normocephalic;  conjunctivae and sclerae clear; moist mucous membranes   NECK : Supple  CHEST/LUNG: Clear to auscultation bilaterally with good air entry   HEART: S1 S2  regular; no murmurs, gallops or rubs  ABDOMEN: Soft, Nontender, Nondistended; Bowel sounds present x 4 quad.  (+) Vogel.    EXTREMITIES: No cyanosis; no edema; no calf tenderness  SKIN: Warm and dry; (+) DTI Coccyx   NERVOUS SYSTEM:  Awake: Oriented to person, not place and time; no new deficits  _________________________________________________  LABS:                        8.4    20.74 )-----------( 398      ( 14 Jun 2023 06:20 )             26.7     06-14    139  |  115<H>  |  55<H>  ----------------------------<  101<H>  3.5   |  18<L>  |  2.29<H>    Ca    8.1<L>      14 Jun 2023 06:20  Phos  3.8     06-14  Mg     2.1     06-14    TPro  6.7  /  Alb  1.9<L>  /  TBili  0.7  /  DBili  x   /  AST  25  /  ALT  20  /  AlkPhos  152<H>  06-14        CAPILLARY BLOOD GLUCOSE            RADIOLOGY & ADDITIONAL TESTS:    Imaging Personally Reviewed:  YES    Consultant(s) Notes Reviewed:   YES    Care Discussed with Consultants :     Plan of care was discussed with patient and /or primary care giver; all questions and concerns were addressed and care was aligned with patient's wishes.

## 2023-06-14 NOTE — PROGRESS NOTE ADULT - PROBLEM SELECTOR PLAN 7
- Pt recently discharged from [facility], discharged home on Thursday 6/1, lives home with wife  - Temp, leukocytosis, and labile BP noted-6/12 Bld cx's pending-f/u results. - Pt recently discharged from [facility], discharged home on Thursday 6/1, lives home with wife  - Temp, leukocytosis, and labile BP noted-6/12 Bld cx's negative pending final results.  - Per Attending, planning for home hospice, no blood draw needed.

## 2023-06-14 NOTE — PROGRESS NOTE ADULT - PROBLEM SELECTOR PLAN 3
- P/w TERENCE on CKD (baseline Scr=1.3)  - SCr 5.89 on admission with chronic valencia as per family valencia since 11/2022  - S/p CT with B/L hydronephrosis and decompressed bladder  - Currently on IVF.  Reassess in AM.   - Concern for blockage somewhere else in  tract.  - 6/14 Repeat Renal US showed resolved hydronephrosis, a R. pleural effusion, a 1.7cm R. renal cyst and cholelithiasis.   - S/p Renal US noted above. Unchanged right hydro but slight improvement in left hydro.    - C/w Valencia- Continue to monitor BMP in AM-f/u results.   - Nephro-Dr. Regalado consulted, appreciated

## 2023-06-14 NOTE — PROVIDER CONTACT NOTE (CRITICAL VALUE NOTIFICATION) - PERSON GIVING RESULT:
Casper Slaughter
Lab
Nisha Mayorga
[facility]
Lab Lg
Lula Julien
Carlene Walker, [facility]

## 2023-06-14 NOTE — PROGRESS NOTE ADULT - ASSESSMENT
Patient is a 89yo Male with h/o dementia, bedbound, BPH w/ chronic valencia, afib (on eliquis), HTN that presents to the ED due to decreased urinary output from valencia catheter. Pt a/w TERENCE. Nephrology consulted for Elevated serum creatinine.  CT abd/pelvis with nodular hepatic parenchymal contour suggestive of underlying hepatocellular dz/cirrhosis. Moderate b/l hydro with hydroureter identified to the level of the urinary bladder. Compleletly decompressed bladder by valencia with bladder wall thickening    1. TERENCE- unknown baseline SCr. Previous SCr 1.66 on 5/29/23 & SCr 1.71 on 5/22/23. TERENCE in the setting of obstructive uropathy with ?cirrhosis with sepsis 2/2 UTI/ relative hypotension. Urology following.    UA active in the setting of infection. FeNa 3.6%; ?obstructive. Renal function slowly improving and now stablized with good urine o/p. Recc to continue IVF to D5 1/2 NS @ 75ml/hr while NPO. Monitor serum Na.     Repeat Renal US with persistent moderate Rt hydro  and mild left hydro (slight improvement); f/u Urology  Strict I/Os. Avoid nephrotoxins/ NSAIDs/ RCA. Monitor BMP.  2. b/l hydronephrosis- Moderate b/l hydro with hydroureter with compressed bladder by valencia. Urology following. c/w good bowel regimen  3. Sepsis 2/2 UTI- (leukocytosis with tachycardia) +UA. Pt on Invanz. UCx Ecoli. BCx +ESBL Ecoli. BCx 6/8- NG  4. Hypotension- BP low again.  Would consider another course of IV albumin (pt is. s/p albumin gtt 25% in 100ml q 8hrs x 48 hrs)  .  Monitor BP    [facility] NEPHROLOGY  Avelino Fair M.D.  ELOY Porter.O.  Carolann Regalado M.D.  Janine Ackerman, MSN, ANP-C  [phone]  Fax: [phone]    [facility] [place] [address], [id number]  [place] [id number]

## 2023-06-14 NOTE — PROVIDER CONTACT NOTE (CRITICAL VALUE NOTIFICATION) - TEST AND RESULT REPORTED:
Urine Culture collected 6/5: greater than 100,000 CFU E.Coli ESBL multiple morphological strain
Blood culture positive for Gram negative nguyễn in both aerobic and anerobic bottle
blood culture 6/7/23 growth in anaerobic bottle, gram negative rods
2 Positive growth Anaerobic ecoli ESBL
2 blood cx from 6/5/2023 has growth in aerobic and anaerobic bottle for E Coli ESBL
Lactate 2.1
blood culture from 6/7/23 growth in anaerobic bottle with gram negative rods

## 2023-06-14 NOTE — CONSULT NOTE ADULT - PROBLEM SELECTOR RECOMMENDATION 6
Patient is a 88yo Male with h/o dementia, bedbound, BPH w/ chronic valencia, afib (on eliquis), HTN that presents to the ED due to decreased urinary output from valencia catheter. Presented to ED for urinary retention/blocked Valencia cath with decreased urinary output and hematuria, noted worsening renal function(TERENCE on CKD).  Bacteremic 2/2 ESBL E coli. increased leukocytosis despite antibiotics and hypotensive.  Overall poor prognosis.  Family agreed fro home hospice.    Please see discussion noted above in GOC conversation. Patient is a 89yo Male with h/o dementia, bedbound, BPH w/ chronic valenica, afib (on eliquis), HTN that presents to the ED due to decreased urinary output from valencia catheter. Presented to ED for urinary retention/blocked Valencia cath with decreased urinary output and hematuria, noted worsening renal function(TERENCE on CKD).  Bacteremic 2/2 ESBL E coli. w increased leukocytosis despite antibiotics and hypotensive.  Overall poor prognosis.  Family agreed for home hospice.   Please see discussion noted above in GOC conversation.

## 2023-06-14 NOTE — PROGRESS NOTE ADULT - SUBJECTIVE AND OBJECTIVE BOX
Full note to follow. [facility] NEPHROLOGY- PROGRESS NOTE    Patient is a 89yo Male with h/o dementia, bedbound, BPH w/ chronic valencia, afib (on eliquis), HTN that presents to the ED due to decreased urinary output from valencia catheter. Pt a/w TERENCE. Nephrology consulted for Elevated serum creatinine.  CT abd/pelvis with nodular hepatic parenchymal contour suggestive of underlying hepatocellular dz/cirrhosis. Moderate b/l hydro with hydroureter identified to the level of the urinary bladder. Compleletly decompressed bladder by valencia with bladder wall thickening    Hospital Medications: Medications reviewed.  REVIEW OF SYSTEMS: Unreliable due to dementia: not answering questions today    VITALS:  T(F): 100 (06-14-23 @ 20:13), Max: 100 (06-14-23 @ 20:13)  HR: 97 (06-14-23 @ 20:13)  BP: 97/47 (06-14-23 @ 20:13)  RR: 18 (06-14-23 @ 20:13)  SpO2: 100% (06-14-23 @ 20:13)  Wt(kg): --    06-13 @ 07:01  -  06-14 @ 07:00  --------------------------------------------------------  IN: 0 mL / OUT: 1100 mL / NET: -1100 mL    06-14 @ 07:01  -  06-14 @ 22:57  --------------------------------------------------------  IN: 0 mL / OUT: 1000 mL / NET: -1000 mL      PHYSICAL EXAM:  Gen: NAD,   HEENT: anicteric  Neck: no JVD  Cards: RRR, +S1/S2, no M/G/R  Resp: CTA B/L  GI: soft, NT/ND, NABS  : +valencia  Extremities: no LE edema B/L;     LABS:  06-14    139  |  115<H>  |  55<H>  ----------------------------<  101<H>  3.5   |  18<L>  |  2.29<H>    Ca    8.1<L>      14 Jun 2023 06:20  Phos  3.8     06-14  Mg     2.1     06-14    TPro  6.7  /  Alb  1.9<L>  /  TBili  0.7  /  DBili      /  AST  25  /  ALT  20  /  AlkPhos  152<H>  06-14    Creatinine Trend: 2.29 <--, 2.49 <--, 2.45 <--, 2.60 <--, 2.99 <--, 3.28 <--, 4.16 <--                        8.4    20.74 )-----------( 398      ( 14 Jun 2023 06:20 )             26.7

## 2023-06-14 NOTE — CONSULT NOTE ADULT - CONSULT REASON
ESBL E coli BSI
Bilateral hydronephrosis
Elevated serum creatinine.
Discuss complex medical decision making related to goals of care due to advanced age w multiple comorbidities

## 2023-06-14 NOTE — CONSULT NOTE ADULT - PROBLEM SELECTOR RECOMMENDATION 4
Clinical evidence indicates that the patient has Severe protein calorie malnutrition/ 3rd degree. Albumin 1.9.  Poor po intake    In context of    Chronic Illness (>1 month)    Energy/Food intake <50% of estimated energy requirement >5 days  Weight loss: Moderate - severe (lbs lost recently)  Body Fat loss: Severe   (Cachexia, temporal wasting, contracted, muscle atrophy)  Muscle mass loss: Severe  (Skin failure/pressure ulcers)  Fluid Accumulation: Severe (Fluid overload, ascites, pleural effusions)   Strength: weakened severe (bedbound)    Recommend:   pleasure feeds as tolerated - aspiration precautions, careful hand-feeding, teaching to caregivers  nutritional supplements as tolerated, nutrition consult    No feeding tube

## 2023-06-14 NOTE — CONSULT NOTE ADULT - PROBLEM SELECTOR RECOMMENDATION 3
TERENCE in the setting of obstructive uropathy.  TERENCE- unknown baseline SCr.  Nephrology following      Avoid nephrotoxic medications/NSAIDs TERENCE in the setting of obstructive uropathy.  TERENCE- unknown baseline SCr.  Nephrology following      Avoid nephrotoxic medications/NSAIDs    CT abd/pelvis with nodular hepatic parenchymal contour suggestive of underlying hepatocellular dz/cirrhosis. Moderate b/l hydro with hydroureter identified to the level of the urinary bladder. Completely decompressed bladder by valencia with bladder wall thickening

## 2023-06-14 NOTE — PROGRESS NOTE ADULT - PROBLEM SELECTOR PROBLEM 9
Hemoglobin A1C higher at 7.9.  much less active due to symptomatic abdominal hernia. No change in treatment but patient to work on increasing activity post op. Recheck hemoglobin A1C in 8/2021.
Results and notes released via code-laboration.
Pneumonia, aspiration
Pneumonia, aspiration

## 2023-06-14 NOTE — PROGRESS NOTE ADULT - PROBLEM SELECTOR PLAN 1
- RN reported (+) blood cx results but unable to provide date of cx's.  6/8 Cx's negative and final.  6/12 cx's NGTD.  - Continued increase in leukocytosis and rest as below-Renal US pending-f/u results   - Temp, leukocytosis, and labile BP noted. 6/12 Bld cx's pending-f/u results.  CXR noted above.    - P/w sepsis, valencia exchanged in ED   - 6/7 hypotensive overnight  - Currently on IVF.  Reassess in AM.   - (+) Bld & Ucx-ESBL Ecoli   - Currently on Ertapenem   - Repeat Bld cx's from 6/7, one bottle (+) GNR   - Repeat Bld cx's pending from 6/8 negative and final   - ID-Dr. Chase consulted, appreciated - RN reported (+) blood cx results but unable to provide date of cx's.  6/8 Cx's negative and final.  6/12 cx's NGTD.  - Continued increase in leukocytosis and rest as below   - Temp, leukocytosis, and labile BP noted.  6/12 Bld cx's negative pending final results. CXR noted above.    - P/w sepsis, valencia exchanged in ED   - 6/7 hypotensive overnight  - Currently on IVF.  Reassess in AM.   - (+) Bld & Ucx-ESBL Ecoli   - Currently on Ertapenem   - Repeat Bld cx's from 6/7, one bottle (+) GNR   - Repeat Bld cx's from 6/8 negative and final   - ID-Dr. Chase consulted, appreciated

## 2023-06-14 NOTE — PROGRESS NOTE ADULT - PROBLEM SELECTOR PLAN 4
- P/w gross blood in valencia catheter  - Now resolved   - H&H stable  - Continue to monitor CBC in AM-f/u results  - Uro-Dr. Chris consulted, appreciated - P/w gross blood in valencia catheter  - Now resolved   - H&H stable  - Per Attending, planning for home hospice, no blood draw needed.    - Uro-Dr. Chris consulted, appreciated

## 2023-06-15 PROCEDURE — 99232 SBSQ HOSP IP/OBS MODERATE 35: CPT | Mod: FS

## 2023-06-15 RX ORDER — POLYETHYLENE GLYCOL 3350 17 G/17G
17 POWDER, FOR SOLUTION ORAL
Qty: 0 | Refills: 0 | DISCHARGE
Start: 2023-06-15

## 2023-06-15 RX ORDER — PANTOPRAZOLE SODIUM 20 MG/1
1 TABLET, DELAYED RELEASE ORAL
Qty: 0 | Refills: 0 | DISCHARGE
Start: 2023-06-15

## 2023-06-15 RX ORDER — TERAZOSIN HYDROCHLORIDE 10 MG/1
1 CAPSULE ORAL
Refills: 0 | DISCHARGE

## 2023-06-15 RX ORDER — SENNA PLUS 8.6 MG/1
2 TABLET ORAL
Qty: 0 | Refills: 0 | DISCHARGE
Start: 2023-06-15

## 2023-06-15 RX ADMIN — APIXABAN 2.5 MILLIGRAM(S): 2.5 TABLET, FILM COATED ORAL at 17:38

## 2023-06-15 RX ADMIN — SENNA PLUS 2 TABLET(S): 8.6 TABLET ORAL at 21:27

## 2023-06-15 RX ADMIN — POLYETHYLENE GLYCOL 3350 17 GRAM(S): 17 POWDER, FOR SOLUTION ORAL at 12:38

## 2023-06-15 RX ADMIN — SODIUM CHLORIDE 75 MILLILITER(S): 9 INJECTION, SOLUTION INTRAVENOUS at 09:53

## 2023-06-15 RX ADMIN — APIXABAN 2.5 MILLIGRAM(S): 2.5 TABLET, FILM COATED ORAL at 05:26

## 2023-06-15 RX ADMIN — SODIUM CHLORIDE 75 MILLILITER(S): 9 INJECTION, SOLUTION INTRAVENOUS at 21:27

## 2023-06-15 RX ADMIN — PANTOPRAZOLE SODIUM 40 MILLIGRAM(S): 20 TABLET, DELAYED RELEASE ORAL at 05:26

## 2023-06-15 NOTE — PROGRESS NOTE ADULT - PROVIDER SPECIALTY LIST ADULT
Nephrology
Nephrology
Infectious Disease
Internal Medicine
Internal Medicine
Nephrology
Urology
Urology
Internal Medicine
Nephrology
Urology
Infectious Disease
Internal Medicine
Internal Medicine
Infectious Disease
Internal Medicine
Infectious Disease
Infectious Disease
Internal Medicine

## 2023-06-15 NOTE — PROGRESS NOTE ADULT - SUBJECTIVE AND OBJECTIVE BOX
NP Note discussed with  Primary Attending    INTERVAL HPI/OVERNIGHT EVENTS: no new complaints    MEDICATIONS  (STANDING):  apixaban 2.5 milliGRAM(s) Oral two times a day  dextrose 5% + sodium chloride 0.45%. 1000 milliLiter(s) (75 mL/Hr) IV Continuous <Continuous>  pantoprazole    Tablet 40 milliGRAM(s) Oral before breakfast  polyethylene glycol 3350 17 Gram(s) Oral daily  senna 2 Tablet(s) Oral at bedtime    MEDICATIONS  (PRN):  acetaminophen     Tablet .. 650 milliGRAM(s) Oral every 6 hours PRN Temp greater or equal to 38C (100.4F)      __________________________________________________  REVIEW OF SYSTEMS:  unable to assess as pt is dementic and lethargic.     Vital Signs Last 24 Hrs  T(C): 36.8 (15 Leon 2023 05:05), Max: 37.8 (14 Jun 2023 20:13)  T(F): 98.2 (15 Leon 2023 05:05), Max: 100 (14 Jun 2023 20:13)  HR: 96 (15 Leon 2023 05:05) (96 - 97)  BP: 106/65 (15 Leon 2023 05:05) (97/47 - 106/65)  BP(mean): 59 (14 Jun 2023 20:13) (59 - 59)  RR: 18 (15 Leon 2023 05:05) (18 - 18)  SpO2: 98% (15 Leon 2023 05:05) (98% - 100%)    Parameters below as of 15 Leon 2023 05:05  Patient On (Oxygen Delivery Method): room air        ________________________________________________  PHYSICAL EXAM:  GENERAL: NAD, cachectic  HEENT: Normocephalic;  conjunctivae and sclerae clear; moist mucous membranes;   NECK : supple  CHEST/LUNG: Clear to auscultation bilaterally with fair  air entry   HEART: S1 S2  regular; no murmurs, gallops or rubs  ABDOMEN: Soft, Nontender, Nondistended; Bowel sounds present  EXTREMITIES: no cyanosis; no edema; no calf tenderness  : Vogel in place, clear urine   SKIN: warm and dry; no rash  NERVOUS SYSTEM:  Awake on stimuli, not following commands. lethargic    _________________________________________________  LABS:                        8.4    20.74 )-----------( 398      ( 14 Jun 2023 06:20 )             26.7     06-14    139  |  115<H>  |  55<H>  ----------------------------<  101<H>  3.5   |  18<L>  |  2.29<H>    Ca    8.1<L>      14 Jun 2023 06:20  Phos  3.8     06-14  Mg     2.1     06-14    TPro  6.7  /  Alb  1.9<L>  /  TBili  0.7  /  DBili  x   /  AST  25  /  ALT  20  /  AlkPhos  152<H>  06-14        CAPILLARY BLOOD GLUCOSE      POCT Blood Glucose.: 87 mg/dL (14 Jun 2023 16:27)        RADIOLOGY & ADDITIONAL TESTS:    Imaging  Reviewed:  YES  no new imaging in progress    Consultant(s) Notes Reviewed:   YES      Plan of care was discussed with patient and /or primary care giver; all questions and concerns were addressed

## 2023-06-15 NOTE — PROGRESS NOTE ADULT - ASSESSMENT
Patient is a 88yo Male with h/o dementia, bedbound, BPH w/ chronic valencia, afib (on eliquis), HTN that presents to the ED due to decreased urinary output from valencia catheter. Pt a/w TERENCE. Nephrology consulted for Elevated serum creatinine.  CT abd/pelvis with nodular hepatic parenchymal contour suggestive of underlying hepatocellular dz/cirrhosis. Moderate b/l hydro with hydroureter identified to the level of the urinary bladder. Compleletly decompressed bladder by valencia with bladder wall thickening    1. TERENCE- unknown baseline SCr. Previous SCr 1.66 on 5/29/23 & SCr 1.71 on 5/22/23. TERENCE in the setting of obstructive uropathy with ?cirrhosis with sepsis 2/2 UTI/ relative hypotension. Urology following.    UA active in the setting of infection. FeNa 3.6%; ?obstructive. Renal function slowly improving and now stablized with good urine o/p. Recc to continue IVF to D5 1/2 NS @ 75ml/hr while NPO. Monitor serum Na.     Repeat Renal US 6/14 with no hydro; resolved; f/u Urology  Strict I/Os. Avoid nephrotoxins/ NSAIDs/ RCA. Monitor BMP.  2. b/l hydronephrosis- Moderate b/l hydro with hydroureter with compressed bladder by valencia. Urology following. c/w good bowel regimen. Repeat Renal US with no hydro- resolved  3. Sepsis 2/2 UTI- (leukocytosis with tachycardia) +UA. Finished Invanz. UCx Ecoli. BCx +ESBL Ecoli. BCx 6/8- NG  4. Hypotension- BP low normal but stable. Monitor BP    [facility] NEPHROLOGY  Avelino Fair M.D.  ELOY Porter.O.  Carolann Regalado M.D.  Janine Ackermna, MSN, ANP-C  [phone]  Fax: [phone] [place] [address], [address]  [place] [id number]

## 2023-06-15 NOTE — PROGRESS NOTE ADULT - PROBLEM SELECTOR PLAN 7
- Pt recently discharged from [facility], discharged home on Thursday 6/1, lives home with wife  - Temp, leukocytosis, and labile BP noted-6/12 Bld cx's negative    - Per Attending, planning for home hospice, no blood draw needed.

## 2023-06-15 NOTE — PROGRESS NOTE ADULT - PROBLEM SELECTOR PROBLEM 3
Infection associated with indwelling urinary catheter
TERENCE (acute kidney injury)
Infection associated with indwelling urinary catheter
TERENCE (acute kidney injury)
Infection associated with indwelling urinary catheter
TERENCE (acute kidney injury)

## 2023-06-15 NOTE — PROGRESS NOTE ADULT - REASON FOR ADMISSION
Urinary retention
Urinary retention/UTI
Urinary retention

## 2023-06-15 NOTE — PROGRESS NOTE ADULT - NS ATTEND OPT1 GEN_ALL_CORE
I independently performed the documented:
I attest my time as attending is greater than 50% of the total combined time spent on qualifying patient care activities by the PA/NP and attending.
I independently performed the documented:
I attest my time as attending is greater than 50% of the total combined time spent on qualifying patient care activities by the PA/NP and attending.

## 2023-06-15 NOTE — PROGRESS NOTE ADULT - PROBLEM SELECTOR PLAN 1
- RN reported (+) blood cx results but unable to provide date of cx's.  6/8 Cx's negative and final.  6/12 cx's NGTD.  - Continued increase in leukocytosis and rest as below   - Temp, leukocytosis, and labile BP noted.  6/12 Bld cx's negative pending final results. CXR noted above.    - P/w sepsis, valencia exchanged in ED   - 6/7 hypotensive overnight  - Currently on IVF.     - (+) Bld & Ucx-ESBL Ecoli   - Currently on Ertapenem- DC on discharge.   - Repeat Bld cx's from 6/7, one bottle (+) GNR   - Repeat Bld cx's from 6/8 negative and final   - ID-Dr. Chase consulted, appreciated

## 2023-06-15 NOTE — PROGRESS NOTE ADULT - PROBLEM SELECTOR PROBLEM 4
Gram-negative bacteremia
Hematuria
Gram-negative bacteremia
Hematuria

## 2023-06-15 NOTE — PROGRESS NOTE ADULT - PROBLEM SELECTOR PROBLEM 2
E coli infection
Acute metabolic encephalopathy
E coli infection
E coli infection
Acute metabolic encephalopathy
Acute metabolic encephalopathy

## 2023-06-15 NOTE — PROGRESS NOTE ADULT - PROBLEM SELECTOR PLAN 4
- P/w gross blood in valencia catheter  - Now resolved   - H&H stable  - Per Attending, planning for home hospice, no blood draw needed.    - Uro-Dr. Chris consulted, appreciated

## 2023-06-15 NOTE — PROGRESS NOTE ADULT - ASSESSMENT
89 year old, Male, from home with PMH of Dementia, BPH w/ chronic valencia, Afib (on Eliquis), & HTN.  Presented to the ED due to decreased urinary output from valencia catheter.  Admitted for Sepsis 2/2 Bacteremia and UTI-ESBL E. Coli. treated with Ertapenem. palliative following for further goals of care. plan to DC on home hospice.

## 2023-06-15 NOTE — PROGRESS NOTE ADULT - NS ATTEND AMEND GEN_ALL_CORE FT
89 year old man with dementia, BPH with chronic indwelling valencia catheter, A-fib on Eliquis, HTN here with blocked catheter and urinary retention. Admit for Sepsis 2/2 ESBL bacteremia, Chronic CAUTI, Acute encephalopathy 2/2 infection and uremia, TERENCE on CKD 3    # Sepsis 2/2 ESBL bacteremia  # Complicated UTI/ CAUTI  # Acute encephalopathy  # Hematuria - traumatic Valencia vs blood thinner induced?  # TERENCE on CKD 3  # Obstructive uropathy and pre-renal TERENCE  # NAGMA  # Hyponatremia  -Valencia cathter changed in ED  - Continue ertapenem     - ID consulted and discussed plan to continue ertapenem  - f/u repeat Bcx drawn yesterday  - Repeat Renal US showing continued hydronephrosis  - Start D5 @ 85cc/hr per nephrology recs  - f/u Urology recs  -  c/w miralax/senna for BM  - Monitor mental status  - DVT ppx
89 year old man with dementia, BPH with chronic indwelling valencia catheter, A-fib on Eliquis, HTN here with blocked catheter and urinary retention. Admit for Sepsis 2/2 ESBL bacteremia, Chronic CAUTI, Acute encephalopathy 2/2 infection and uremia, TERENCE on CKD 3    # Sepsis 2/2 ESBL bacteremia  # Complicated UTI/ CAUTI  # Acute encephalopathy  # Hematuria - traumatic Valencia vs blood thinner induced?  # TERENCE on CKD 3  # Obstructive uropathy and pre-renal TERENCE  # NAGMA  # Hyponatremia  -Valencia cathter changed in ED  - Continue ertapenem     - ID consulted and discussed plan to continue ertapenem and repeat Bcx tmrw AM  - Repeat Renal US tmrw  - Albumin per nephrology recs  - f/u Urology recs  - Monitor mental status  - DVT ppx
#Sepsis 2/2 ESBL bacteremia  #Complicated UTI/ CAUTI  #Acute encephalopathy with underlying dementia 2/2 infection  #Hematuria - traumatic Valencia vs blood thinner induced?  #TERECNE on CKD 3, improving  #Obstructive uropathy and pre-renal TERENCE  #NAGMA  #Hyponatremia, resolved    89yM with PMH of dementia, BPH requiring chronic valencia, a-fib on Eliquis, HTN, who came from home with decreased urine output. Patient admitted with sepsis due to ESBL bacteremia and chronic CAUTI.  Patient seen at bedside. Sleeping, but arousable. Planned for discharge with home hospice.     -Seen by palliative care yesterday, per family, patient will go home with home hospice at 10a on 06/16  -Valencia catheter changed in ED  -Repeat US kidneys negative for hydronephrosis  -Ertapenem - day 9, abx to be discontinued on discharge   -NPO due to acute encephalopathy/lethargy; IVF with D5 1/2NS  -Repeat BCx on 06/12 NGTD  -ESR/CRP elevated  -Bowel regimen  -DVT ppx: Eliquis.
#Sepsis 2/2 ESBL bacteremia  #Complicated UTI/ CAUTI  #Acute encephalopathy with underlying dementia 2/2 infection  #Hematuria - traumatic Valencia vs blood thinner induced?  #TERENCE on CKD 3, improving  #Obstructive uropathy and pre-renal TERENCE  #NAGMA  #Hyponatremia, resolved    89yM with PMH of dementia, BPH requiring chronic valencia, a-fib on Eliquis, HTN, who came from home with decreased urine output. Patient admitted with sepsis due to ESBL bacteremia and chronic CAUTI.  Patient seen at bedside. Sleeping, but arousable, oriented x2 (name and place). Chronically ill appearing, very slow to respond.     -Valencia catheter changed in ED  -Worsening leukocytosis, low grade temp, unclear etiology. R/O aspiration pneumonia, CXR clear, and patient is already on ertapenem  -Repeat US kidneys today to look for improvement in hydronephrosis, discussed with ID attending, Dr. Chase  -Ertapenem - day 8  -NPO due to acute encephalopathy/lethargy; IVF with D5 1/2NS  -Repeat BCx on 06/12 pending   -ESR/CRP elevated  -Bowel regimen  -ID, urology following  -Palliative consulted for GOC; patient made DNR/DNI on admission, but will need to delineate patient's wishes further  -DVT ppx: Eliquis
89 year old man with dementia, BPH with chronic indwelling valencia catheter, A-fib on Eliquis, HTN here with blocked catheter and urinary retention. Admit for Sepsis 2/2 ESBL bacteremia, Chronic CAUTI, Acute encephalopathy 2/2 infection and uremia, TERENCE on CKD 3    # Sepsis 2/2 ESBL bacteremia  # Complicated UTI/ CAUTI  # Acute encephalopathy  # Hematuria - traumatic Valencia vs blood thinner induced?  # TERENCE on CKD 3  # Obstructive uropathy and pre-renal TERENCE  # NAGMA  # Hyponatremia  Valencia cathter changed in ED. Bcx showing ESBL. CT A/P showing bilateral hydronephrosis. Repeat Renal US showing continued hydronephrosis.  - Increase in WBC today with T 100F and appears more lethargic this AM   - CXR showing R pleural effusion seen on previous CT A/P   - NPO for now in setting of mental status  - Continue ertapenem     - ID consulted and discussed plan to continue ertapenem  - Repeat Bcx drawn NGTD  - CBC/BMP reviewed- TERENCE improving with IVF - continue and monitor  - f/u Urology recs  - c/w miralax/senna for BM  - Monitor mental status  - DVT ppx
89 year old man with dementia, BPH with chronic indwelling valencia catheter, A-fib on Eliquis, HTN here with blocked catheter and urinary retention. Admit for Sepsis 2/2 ESBL bacteremia, Chronic CAUTI, Acute encephalopathy 2/2 infection and uremia, TERENCE on CKD 3    # Sepsis 2/2 ESBL bacteremia  # Complicated UTI/ CAUTI  # Acute encephalopathy  # Hematuria - traumatic Valencia vs blood thinner induced?  # TERENCE on CKD 3  # Obstructive uropathy and pre-renal TERENCE  # NAGMA  # Hyponatremia  -Valencia cathter changed in ED  - Continue ertapenem     - ID consulted and discussed plan to continue ertapenem  - f/u repeat Bcx drawn today  - f/u Repeat Renal US today to monitor hydronephrosis  - Start D5 @ 65cc/hr per nephrology recs  - f/u Urology recs  - Started miralax/senna for BM  - Monitor mental status  - DVT ppx.

## 2023-06-15 NOTE — PROGRESS NOTE ADULT - SUBJECTIVE AND OBJECTIVE BOX
[facility] NEPHROLOGY- PROGRESS NOTE    Patient is a 89yo Male with h/o dementia, bedbound, BPH w/ chronic valencia, afib (on eliquis), HTN that presents to the ED due to decreased urinary output from valencia catheter. Pt a/w TERENCE. Nephrology consulted for Elevated serum creatinine.  CT abd/pelvis with nodular hepatic parenchymal contour suggestive of underlying hepatocellular dz/cirrhosis. Moderate b/l hydro with hydroureter identified to the level of the urinary bladder. Compleletly decompressed bladder by valencia with bladder wall thickening    Hospital Medications: Medications reviewed.  REVIEW OF SYSTEMS: Unreliable due to dementia: not answering questions today    VITALS:  T(F): 97.8 (06-15-23 @ 12:17), Max: 100 (06-14-23 @ 20:13)  HR: 98 (06-15-23 @ 12:17)  BP: 101/64 (06-15-23 @ 12:17)  RR: 18 (06-15-23 @ 12:17)  SpO2: 100% (06-15-23 @ 12:17)  Wt(kg): --    06-14 @ 07:01  -  06-15 @ 07:00  --------------------------------------------------------  IN: 0 mL / OUT: 1700 mL / NET: -1700 mL    06-15 @ 07:01  -  06-15 @ 15:04  --------------------------------------------------------  IN: 0 mL / OUT: 500 mL / NET: -500 mL      PHYSICAL EXAM:  Gen: NAD,   HEENT: anicteric  Neck: no JVD  Cards: RRR, +S1/S2, no M/G/R  Resp: decreased BS at bases  GI: soft, NT/ND, NABS  : +valencia  Extremities: no LE edema B/L;     LABS:  06-14    139  |  115<H>  |  55<H>  ----------------------------<  101<H>  3.5   |  18<L>  |  2.29<H>    Ca    8.1<L>      14 Jun 2023 06:20  Phos  3.8     06-14  Mg     2.1     06-14    TPro  6.7  /  Alb  1.9<L>  /  TBili  0.7  /  DBili      /  AST  25  /  ALT  20  /  AlkPhos  152<H>  06-14    Creatinine Trend: 2.29 <--, 2.49 <--, 2.45 <--, 2.60 <--, 2.99 <--, 3.28 <--                        8.4    20.74 )-----------( 398      ( 14 Jun 2023 06:20 )             26.7     Urine Studies:        < from: US Renal (06.14.23 @ 11:05) >    ACC: [id number] EXAM:  US KIDNEY(S)   ORDERED BY: NEMO WALDEN     PROCEDURE DATE:  06/14/2023          INTERPRETATION:  CLINICAL INFORMATION: Follow-up evaluation of   hydronephrosis. Leukocytosis.    COMPARISON: 06/08/2023.    TECHNIQUE: Sonography of the kidneys and bladder.    FINDINGS:  Right kidney: 12.3 cm. No renal mass, hydronephrosis or calculi. Evidence   of 3 cysts measuring up to 1.7 cm.    There is incidental evidence of cholelithiasis. A right pleural effusion   is noted.    Leftkidney: 12.3 cm. No renal mass, hydronephrosis or calculi.    Urinary bladder: Minimally distended with indwelling Valencia catheter.    IMPRESSION:  No evidence of hydronephrosis.  Incidental evidence of cholelithiasis and a right pleural effusion.      --- End of Report ---            SHAHNAZ WAY MD; Attending Radiologist  This document has been electronically signed. Jun 14 2023 11:40AM    < end of copied text >

## 2023-06-16 VITALS
OXYGEN SATURATION: 97 % | DIASTOLIC BLOOD PRESSURE: 63 MMHG | SYSTOLIC BLOOD PRESSURE: 102 MMHG | RESPIRATION RATE: 18 BRPM | TEMPERATURE: 99 F | HEART RATE: 97 BPM

## 2023-06-16 PROCEDURE — 85652 RBC SED RATE AUTOMATED: CPT

## 2023-06-16 PROCEDURE — 83690 ASSAY OF LIPASE: CPT

## 2023-06-16 PROCEDURE — 99239 HOSP IP/OBS DSCHRG MGMT >30: CPT

## 2023-06-16 PROCEDURE — 87077 CULTURE AEROBIC IDENTIFY: CPT

## 2023-06-16 PROCEDURE — 85027 COMPLETE CBC AUTOMATED: CPT

## 2023-06-16 PROCEDURE — 82570 ASSAY OF URINE CREATININE: CPT

## 2023-06-16 PROCEDURE — 87040 BLOOD CULTURE FOR BACTERIA: CPT

## 2023-06-16 PROCEDURE — 82803 BLOOD GASES ANY COMBINATION: CPT

## 2023-06-16 PROCEDURE — 84300 ASSAY OF URINE SODIUM: CPT

## 2023-06-16 PROCEDURE — 83605 ASSAY OF LACTIC ACID: CPT

## 2023-06-16 PROCEDURE — 80053 COMPREHEN METABOLIC PANEL: CPT

## 2023-06-16 PROCEDURE — 87150 DNA/RNA AMPLIFIED PROBE: CPT

## 2023-06-16 PROCEDURE — P9047: CPT

## 2023-06-16 PROCEDURE — 74176 CT ABD & PELVIS W/O CONTRAST: CPT | Mod: MA

## 2023-06-16 PROCEDURE — 99285 EMERGENCY DEPT VISIT HI MDM: CPT | Mod: 25

## 2023-06-16 PROCEDURE — 80048 BASIC METABOLIC PNL TOTAL CA: CPT

## 2023-06-16 PROCEDURE — 36415 COLL VENOUS BLD VENIPUNCTURE: CPT

## 2023-06-16 PROCEDURE — 71045 X-RAY EXAM CHEST 1 VIEW: CPT

## 2023-06-16 PROCEDURE — 76775 US EXAM ABDO BACK WALL LIM: CPT

## 2023-06-16 PROCEDURE — 82962 GLUCOSE BLOOD TEST: CPT

## 2023-06-16 PROCEDURE — 83735 ASSAY OF MAGNESIUM: CPT

## 2023-06-16 PROCEDURE — 86140 C-REACTIVE PROTEIN: CPT

## 2023-06-16 PROCEDURE — 93005 ELECTROCARDIOGRAM TRACING: CPT

## 2023-06-16 PROCEDURE — 81001 URINALYSIS AUTO W/SCOPE: CPT

## 2023-06-16 PROCEDURE — 84100 ASSAY OF PHOSPHORUS: CPT

## 2023-06-16 PROCEDURE — 87186 SC STD MICRODIL/AGAR DIL: CPT

## 2023-06-16 PROCEDURE — 96374 THER/PROPH/DIAG INJ IV PUSH: CPT

## 2023-06-16 PROCEDURE — 92610 EVALUATE SWALLOWING FUNCTION: CPT

## 2023-06-16 PROCEDURE — 96361 HYDRATE IV INFUSION ADD-ON: CPT

## 2023-06-16 PROCEDURE — 87086 URINE CULTURE/COLONY COUNT: CPT

## 2023-06-16 PROCEDURE — 85025 COMPLETE CBC W/AUTO DIFF WBC: CPT

## 2023-06-16 RX ADMIN — PANTOPRAZOLE SODIUM 40 MILLIGRAM(S): 20 TABLET, DELAYED RELEASE ORAL at 05:55

## 2023-06-16 RX ADMIN — APIXABAN 2.5 MILLIGRAM(S): 2.5 TABLET, FILM COATED ORAL at 05:56

## 2023-06-16 RX ADMIN — SODIUM CHLORIDE 75 MILLILITER(S): 9 INJECTION, SOLUTION INTRAVENOUS at 05:55

## 2023-06-16 NOTE — CHART NOTE - NSCHARTNOTEFT_GEN_A_CORE
Reassessment:     Patient is a 89y old  Male who presents with a chief complaint of Urinary retention (15 Leon 2023 15:04)      Factors impacting intake: [ ] none [ ] nausea  [ ] vomiting [ ] diarrhea [ ] constipation  [ ]chewing problems [ ] swallowing issues  [X ] other: Dementia, BPH w/ chronic Vogel, Afib (on Eliquis), & HTN.     Diet Prescription: Minced & Moist - 23     Intake: Visited patient with wife at bedside, reports pt. "ate most of lunch tray" with feeding assists, observed tray at bedside, intake >75%, denies n/vomiting or diarrhea, also stated "pt. is leaving today"? encouraged increased nutrient density meals/snack & supplements once d/kris to home d/w nursing, rec. if pt. not d/kris c/w diet as ordered & add Ensure Enlive 1 serving BID daily. RD available     Daily Weight in k.8 (2023 05:17)    % Weight Change: wt. fluctuations noted possible due fluid shifts    Pertinent Medications: MEDICATIONS  (STANDING):  apixaban 2.5 milliGRAM(s) Oral two times a day  dextrose 5% + sodium chloride 0.45%. 1000 milliLiter(s) (75 mL/Hr) IV Continuous <Continuous>  pantoprazole    Tablet 40 milliGRAM(s) Oral before breakfast  senna 2 Tablet(s) Oral at bedtime    MEDICATIONS  (PRN):  acetaminophen     Tablet .. 650 milliGRAM(s) Oral every 6 hours PRN Temp greater or equal to 38C (100.4F)  bisacodyl Suppository 10 milliGRAM(s) Rectal daily PRN Constipation    Pertinent Labs:  Na139 mmol/L Glu 101 mg/dL<H> K+ 3.5 mmol/L Cr  2.29 mg/dL<H> BUN 55 mg/dL<H>  Phos 3.8 mg/dL  Alb 1.9 g/dL<L>     CAPILLARY BLOOD GLUCOSE      Skin: Pressure injury with wound care    Estimated Needs:   [ X] no change since previous assessment  [ ] recalculated:     Previous Nutrition Diagnosis:   [ ] Inadequate Energy Intake [X ]Inadequate Oral Intake [ ] Excessive Energy Intake   [ ] Underweight [ ] Increased Nutrient Needs [ ] Overweight/Obesity   [ ] Altered GI Function [ ] Unintended Weight Loss [ ] Food & Nutrition Related Knowledge Deficit [ ] Malnutrition     Nutrition Diagnosis is [ X] ongoing  [ ] resolved [ ] not applicable     New Nutrition Diagnosis: [ ] not applicable     Interventions: Optimal nutrition meeting >75% of energy nutrient needs via tolerated route     Recommend  [ ] Change Diet To:  [X ] Nutrition Supplement: Add MVI/minerals daily  [ ] Nutrition Support  [X ] Other: Nursing to continue feeding assistance and encouragement with aspiration precaution     Monitoring and Evaluation:   [ ] PO intake [ x ] Tolerance to diet prescription [ x ] weights [ x ] labs[ x ] follow up per protocol  [ ] other:

## 2023-06-16 NOTE — DISCHARGE NOTE NURSING/CASE MANAGEMENT/SOCIAL WORK - PATIENT PORTAL LINK FT
You can access the FollowMyHealth Patient Portal offered by [facility] by registering at the following website: [email or web address]. By joining [employer]’s FollowMyHealth portal, you will also be able to view your health information using other applications (apps) compatible with our system.

## 2023-06-16 NOTE — DISCHARGE NOTE NURSING/CASE MANAGEMENT/SOCIAL WORK - NSDCPEFALRISK_GEN_ALL_CORE
For information on Fall & Injury Prevention, visit: [email or web address] OR  [email or web address] OR  https://www.cdc.gov/steadi/patient.html

## 2023-06-16 NOTE — DISCHARGE NOTE NURSING/CASE MANAGEMENT/SOCIAL WORK - NSDCVIVACCINE_GEN_ALL_CORE_FT
influenza, high-dose, quadrivalent; 14-Nov-2022 11:21; Saige Wolff (ELVIN); [employer]; [id number] (Exp. Date: 30-Jun-2023); IntraMuscular; Deltoid Right.; 0.7 milliLiter(s); VIS (VIS Published: 06-Aug-2021, VIS Presented: 14-Nov-2022);

## 2023-06-17 LAB
CULTURE RESULTS: SIGNIFICANT CHANGE UP
CULTURE RESULTS: SIGNIFICANT CHANGE UP
SPECIMEN SOURCE: SIGNIFICANT CHANGE UP
SPECIMEN SOURCE: SIGNIFICANT CHANGE UP

## 2024-08-13 NOTE — PHYSICAL THERAPY INITIAL EVALUATION ADULT - ASSISTIVE DEVICE FOR TRANSFER: STAND/SIT, REHAB EVAL
rolling walker Render In Strict Bullet Format?: No Continue Regimen: ketoconazole 2 % shampoo \\nQuantity: 120.0 ml  Days Supply: 30\\nSig: Apply to scalp every other day for 2 weeks, lather let sit for 5min, then rinse off Detail Level: Zone

## 2024-12-20 NOTE — DIETITIAN INITIAL EVALUATION ADULT - PROBLEM/PLAN-5
What Is The Reason For Today's Visit?: Full Body Skin Examination What Is The Reason For Today's Visit? (Being Monitored For X): concerning skin lesions on an annual basis DISPLAY PLAN FREE TEXT

## 2025-04-08 NOTE — ED PROVIDER NOTE - IV ALTEPLASE EXCL ABS HIDDEN
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 Do not soak wound in tub     DRIVING: No driving while on pain medication      RETURN TO WORK:   No overhead lifting   No repetitive motion   No lifting more than 5 pounds   No strenuous use of left arm   No tight fitting clothing to affected arm     WALKING:   Yes      ACTIVITY:   No driving for 24 hours.  General guidelines for activity:  Avoid strenuous activity/grasping or lifting anything heavier than 10 pounds until doctor's approval.  Do not wrap anything tight around incision and/or anything that will rub against incision   It is OK to be up and walking around. Going up and down stairs is also OK.   Do what is comfortable: stop and rest when you feel tired.   You will have pain medicine ordered. Take as directed.  Do not mix with alcohol.  Do not drive when taking pain medication  Diet: Resume your regular diet.    After you leave the hospital, call your doctor or seek help [phone] if any of the following occurs:    Signs of infection, including fever and chills with temperature greater than 101  Redness or drainage from incision  Excessive warmth.   Excessive swelling or tenderness at incisional site.   Dizziness or lightheadedness.   Rash or hives .  Numbness to affected limb.  Nails are blue/dusky and cold.  No blood draws or blood pressures from affected limb        LIFTING: Less than 5 pounds for 2weeks      DIET:   Ok to resume regular diet.     SPECIAL INSTRUCTIONS:   Call Dr. Cervantes's office at [phone] if you have a fever 
show

## 2025-04-18 NOTE — PROGRESS NOTE ADULT - ASSESSMENT
Quality Measures: Hospice Services Provided To Patient Any Time During The Measurement Period.: Hospice services provided to patient any time during the measurement period. Quality 47: Advance Care Plan: Advance Care Planning discussed and documented in the medical record; patient did not wish or was not able to name a surrogate decision maker or provide an advance care plan. Quality 226: Preventive Care And Screening: Tobacco Use: Screening And Cessation Intervention: Patient screened for tobacco use and is an ex/non-smoker Detail Level: Detailed Patient is a 88yo Male with h/o dementia, bedbound, BPH w/ chronic valencia, afib (on eliquis), HTN that presents to the ED due to decreased urinary output from valencia catheter. Pt a/w TERENCE. Nephrology consulted for Elevated serum creatinine.  CT abd/pelvis with nodular hepatic parenchymal contour suggestive of underlying hepatocellular dz/cirrhosis. Moderate b/l hydro with hydroureter identified to the level of the urinary bladder. Compleletly decompressed bladder by valencia with bladder wall thickening    1. TERENCE- unknown baseline SCr. Previous SCr 1.66 on 5/29/23 & SCr 1.71 on 5/22/23. TERENCE in the setting of obstructive uropathy with ?cirrhosis with sepsis 2/2 UTI/ relative hypotension. Urology following.    UA active in the setting of infection. FeNa 3.6%; ?obstructive. Renal function slowly improving and now stablized with good urine o/p. Recc to change IVF to D5 1/2 NS @ 75ml/hr while NPO. Monitor serum Na.     Repeat Renal US with persistent moderate Rt hydro  and mild left hydro (slight improvement); f/u Urology  Strict I/Os. Avoid nephrotoxins/ NSAIDs/ RCA. Monitor BMP.  2. b/l hydronephrosis- Moderate b/l hydro with hydroureter with compressed bladder by valencia. Urology following. c/w good bowel regimen  3. Sepsis 2/2 UTI- (leukocytosis with tachycardia) +UA. Pt on Invanz. UCx Ecoli. BCx +ESBL Ecoli. BCx 6/8- NG  4. Hypotension- BP low normal. s/p albumin gtt 25% in 100ml q 8hrs x 48 hrs.  Monitor BP    [facility]  Avelino Fair M.D.  Harrison Barajas D.O.  Carolann Regalado M.D.  Janine Ackerman, MSN, ANP-C  [phone]  Fax: [phone] [place] [address], [address]  [place] [address]       Quality 130: Documentation Of Current Medications In The Medical Record: Current Medications Documented